# Patient Record
Sex: FEMALE | Race: WHITE | NOT HISPANIC OR LATINO | Employment: PART TIME | URBAN - METROPOLITAN AREA
[De-identification: names, ages, dates, MRNs, and addresses within clinical notes are randomized per-mention and may not be internally consistent; named-entity substitution may affect disease eponyms.]

---

## 2017-01-09 ENCOUNTER — HOSPITAL ENCOUNTER (OUTPATIENT)
Dept: ULTRASOUND IMAGING | Facility: CLINIC | Age: 42
Discharge: HOME | End: 2017-01-09
Payer: COMMERCIAL

## 2017-01-09 DIAGNOSIS — R92.2 BREAST DENSITY: ICD-10-CM

## 2017-01-09 PROCEDURE — 76641 ULTRASOUND BREAST COMPLETE: CPT

## 2017-01-09 PROCEDURE — 76377 3D RENDER W/INTRP POSTPROCES: CPT

## 2017-04-26 ENCOUNTER — ALLSCRIPTS OFFICE VISIT (OUTPATIENT)
Dept: OTHER | Facility: OTHER | Age: 42
End: 2017-04-26

## 2017-04-26 ENCOUNTER — TRANSCRIBE ORDERS (OUTPATIENT)
Dept: LAB | Facility: CLINIC | Age: 42
End: 2017-04-26

## 2017-04-26 ENCOUNTER — APPOINTMENT (OUTPATIENT)
Dept: LAB | Facility: CLINIC | Age: 42
End: 2017-04-26
Payer: COMMERCIAL

## 2017-04-26 DIAGNOSIS — F41.9 ANXIETY DISORDER: ICD-10-CM

## 2017-04-26 DIAGNOSIS — E66.01 MORBID (SEVERE) OBESITY DUE TO EXCESS CALORIES (HCC): ICD-10-CM

## 2017-04-26 LAB
ALBUMIN SERPL BCP-MCNC: 4 G/DL (ref 3.5–5)
ALP SERPL-CCNC: 100 U/L (ref 46–116)
ALT SERPL W P-5'-P-CCNC: 19 U/L (ref 12–78)
ANION GAP SERPL CALCULATED.3IONS-SCNC: 6 MMOL/L (ref 4–13)
AST SERPL W P-5'-P-CCNC: 10 U/L (ref 5–45)
BILIRUB SERPL-MCNC: 0.55 MG/DL (ref 0.2–1)
BUN SERPL-MCNC: 11 MG/DL (ref 5–25)
CALCIUM SERPL-MCNC: 8.7 MG/DL (ref 8.3–10.1)
CHLORIDE SERPL-SCNC: 105 MMOL/L (ref 100–108)
CHOLEST SERPL-MCNC: 172 MG/DL (ref 50–200)
CO2 SERPL-SCNC: 29 MMOL/L (ref 21–32)
CREAT SERPL-MCNC: 0.61 MG/DL (ref 0.6–1.3)
GFR SERPL CREATININE-BSD FRML MDRD: >60 ML/MIN/1.73SQ M
GLUCOSE P FAST SERPL-MCNC: 98 MG/DL (ref 65–99)
HDLC SERPL-MCNC: 43 MG/DL (ref 40–60)
LDLC SERPL CALC-MCNC: 97 MG/DL (ref 0–100)
POTASSIUM SERPL-SCNC: 4.3 MMOL/L (ref 3.5–5.3)
PROT SERPL-MCNC: 7.8 G/DL (ref 6.4–8.2)
SODIUM SERPL-SCNC: 140 MMOL/L (ref 136–145)
TRIGL SERPL-MCNC: 161 MG/DL
TSH SERPL DL<=0.05 MIU/L-ACNC: 1.04 UIU/ML (ref 0.36–3.74)

## 2017-04-26 PROCEDURE — 84443 ASSAY THYROID STIM HORMONE: CPT

## 2017-04-26 PROCEDURE — 80061 LIPID PANEL: CPT

## 2017-04-26 PROCEDURE — 36415 COLL VENOUS BLD VENIPUNCTURE: CPT

## 2017-04-26 PROCEDURE — 80053 COMPREHEN METABOLIC PANEL: CPT

## 2017-07-10 ENCOUNTER — HOSPITAL ENCOUNTER (OUTPATIENT)
Dept: RADIOLOGY | Age: 42
Discharge: HOME | End: 2017-07-10
Payer: COMMERCIAL

## 2017-07-10 DIAGNOSIS — R92.2 INCONCLUSIVE MAMMOGRAM: ICD-10-CM

## 2017-07-10 PROCEDURE — G0202 SCR MAMMO BI INCL CAD: HCPCS

## 2017-07-10 PROCEDURE — 77063 BREAST TOMOSYNTHESIS BI: CPT

## 2017-07-12 ENCOUNTER — ALLSCRIPTS OFFICE VISIT (OUTPATIENT)
Dept: OTHER | Facility: OTHER | Age: 42
End: 2017-07-12

## 2017-07-12 ENCOUNTER — TRANSCRIBE ORDERS (OUTPATIENT)
Dept: ADMINISTRATIVE | Facility: HOSPITAL | Age: 42
End: 2017-07-12

## 2017-07-12 DIAGNOSIS — Z12.39 SCREENING BREAST EXAMINATION: ICD-10-CM

## 2017-07-12 DIAGNOSIS — R92.2 BREAST DENSITY: Primary | ICD-10-CM

## 2017-07-13 ENCOUNTER — ALLSCRIPTS OFFICE VISIT (OUTPATIENT)
Dept: OTHER | Facility: OTHER | Age: 42
End: 2017-07-13

## 2017-07-20 ENCOUNTER — GENERIC CONVERSION - ENCOUNTER (OUTPATIENT)
Dept: OTHER | Facility: OTHER | Age: 42
End: 2017-07-20

## 2017-07-25 ENCOUNTER — ALLSCRIPTS OFFICE VISIT (OUTPATIENT)
Dept: OTHER | Facility: OTHER | Age: 42
End: 2017-07-25

## 2017-07-27 ENCOUNTER — GENERIC CONVERSION - ENCOUNTER (OUTPATIENT)
Dept: OTHER | Facility: OTHER | Age: 42
End: 2017-07-27

## 2017-08-03 ENCOUNTER — GENERIC CONVERSION - ENCOUNTER (OUTPATIENT)
Dept: OTHER | Facility: OTHER | Age: 42
End: 2017-08-03

## 2017-08-10 ENCOUNTER — GENERIC CONVERSION - ENCOUNTER (OUTPATIENT)
Dept: OTHER | Facility: OTHER | Age: 42
End: 2017-08-10

## 2017-08-17 ENCOUNTER — GENERIC CONVERSION - ENCOUNTER (OUTPATIENT)
Dept: OTHER | Facility: OTHER | Age: 42
End: 2017-08-17

## 2017-08-24 ENCOUNTER — GENERIC CONVERSION - ENCOUNTER (OUTPATIENT)
Dept: OTHER | Facility: OTHER | Age: 42
End: 2017-08-24

## 2017-08-31 ENCOUNTER — GENERIC CONVERSION - ENCOUNTER (OUTPATIENT)
Dept: OTHER | Facility: OTHER | Age: 42
End: 2017-08-31

## 2017-09-07 ENCOUNTER — ALLSCRIPTS OFFICE VISIT (OUTPATIENT)
Dept: OTHER | Facility: OTHER | Age: 42
End: 2017-09-07

## 2017-09-14 ENCOUNTER — GENERIC CONVERSION - ENCOUNTER (OUTPATIENT)
Dept: OTHER | Facility: OTHER | Age: 42
End: 2017-09-14

## 2017-09-20 ENCOUNTER — ALLSCRIPTS OFFICE VISIT (OUTPATIENT)
Dept: OTHER | Facility: OTHER | Age: 42
End: 2017-09-20

## 2017-09-21 ENCOUNTER — GENERIC CONVERSION - ENCOUNTER (OUTPATIENT)
Dept: OTHER | Facility: OTHER | Age: 42
End: 2017-09-21

## 2017-09-28 ENCOUNTER — GENERIC CONVERSION - ENCOUNTER (OUTPATIENT)
Dept: OTHER | Facility: OTHER | Age: 42
End: 2017-09-28

## 2017-10-10 ENCOUNTER — ALLSCRIPTS OFFICE VISIT (OUTPATIENT)
Dept: OTHER | Facility: OTHER | Age: 42
End: 2017-10-10

## 2017-10-11 ENCOUNTER — GENERIC CONVERSION - ENCOUNTER (OUTPATIENT)
Dept: OTHER | Facility: OTHER | Age: 42
End: 2017-10-11

## 2017-10-12 ENCOUNTER — GENERIC CONVERSION - ENCOUNTER (OUTPATIENT)
Dept: OTHER | Facility: OTHER | Age: 42
End: 2017-10-12

## 2017-10-18 ENCOUNTER — GENERIC CONVERSION - ENCOUNTER (OUTPATIENT)
Dept: OTHER | Facility: OTHER | Age: 42
End: 2017-10-18

## 2017-11-08 ENCOUNTER — GENERIC CONVERSION - ENCOUNTER (OUTPATIENT)
Dept: OTHER | Facility: OTHER | Age: 42
End: 2017-11-08

## 2017-11-15 ENCOUNTER — ALLSCRIPTS OFFICE VISIT (OUTPATIENT)
Dept: OTHER | Facility: OTHER | Age: 42
End: 2017-11-15

## 2017-11-16 NOTE — PROGRESS NOTES
Assessment    1  Acute upper respiratory infection (465 9) (J06 9)    Plan  Acute upper respiratory infection    · Fluticasone Propionate 50 MCG/ACT Nasal Suspension; USE 2 SPRAYS IN [facility] ONCE DAILY    Discussion/Summary    Nighat Sanderson is here with symptoms of viral upper respiratory infection  We discussed symptomatic treatment with increased water intake  In addition she should continue with the Mucinex which has been helpful  Recommended Flonase nasal spray 2 sprays each nostril daily  She may also use over-the-counter saline spray  should contact us if her symptoms are not improving over the next few days  Chief Complaint  cold      History of Present Illness  HPI: cold sx began 2 weeks ago with cough, tickleevolved into productive cough and her sinuses are full and she has severe nasal congestionis taking children's Mucinex which helps a littleis stable on Prozac and clonazepam  She sees dr Les Aragon     Review of Systems   Constitutional: feeling poorly-- and-- feeling tired, but-- no fever-- and-- no chills  ENT: nasal discharge, but-- no earache-- and-- no sore throat  Cardiovascular: no complaints of slow or fast heart rate, no chest pain, no palpitations, no leg claudication or lower extremity edema  Respiratory: cough  Active Problems    1  Acute sinusitis (461 9) (J01 90)   2  Anxiety (300 00) (F41 9)   3  Dense breast tissue (793 82) (R92 2)   4  Encounter for breast cancer screening other than mammogram (V76 10) (Z12 39)   5  Encounter for routine gynecological examination (V72 31) (Z01 419)   6  Encounter for screening mammogram for malignant neoplasm of breast (V76 12) (Z12 31)   7  Flu vaccine need (V04 81) (Z23)   8  IBS (irritable bowel syndrome) (564 1) (K58 9)   9  OCD (obsessive compulsive disorder) (300 3) (F42 9)   10  Panic attacks (300 01) (F41 0)   11  Pap smear, as part of routine gynecological examination (V76 2) (Z01 419)   12  Seasonal allergies (477 9) (J30 2)   13   Severe obesity (BMI 35 0-39 9) (278 01) (E66 01)   14  Vitamin D deficiency (268 9) (E55 9)    Past Medical History  1  History of Benign cyst of breast (610 9) (N60 09)   2  History of pilonidal cyst (V13 3) (Z87 2)   3  History of pregnancy (V13 29)   4  History of spontaneous  (V13 29) (Z87 59)   5  History of Menarche (V21 8)   6  History of Sinus problem (473 9) (J34 9)  Active Problems And Past Medical History Reviewed: The active problems and past medical history were reviewed and updated today  Family History  Mother    1  Family history of Colon carcinoma   2  Family history of diabetes mellitus (V18 0) (Z83 3)   3  Family history of gallbladder disease (V18 59) (Z83 79)   4  Family history of hypertension (V17 49) (Z82 49)   5  Family history of irritable bowel syndrome (V18 59) (Z83 79)   6  Family history of Recurrent kidney stones  Father    7  Family history of diabetes mellitus (V18 0) (Z83 3)   8  Family history of hypertension (V17 49) (Z82 49)  Brother    5  Family history of Melanoma  Grandmother    8  Family history of irritable bowel syndrome (V18 59) (Z83 79)  Maternal Grandmother    6  Family history of malignant neoplasm of breast (V16 3) (Z80 3)  Paternal Grandmother    15  Family history of malignant neoplasm of stomach (V16 0) (Z80 0)   13  Family history of Ovarian cancer  Maternal Uncle    15  Family history of Malignant neoplasm of pancreas, unspecified location of malignancy    Social History   · Always uses seat belt   · Daily caffeine consumption   · Never a smoker   · No alcohol use   · No drug use    Surgical History    1  History of Biopsy Skin   2  History of Breast Surgery Excision Of Breast Single Lesion   3  History of Dilation And Curettage   4  History of Oral Surgery   5  History of Pilonidal Cyst Resection    Current Meds   1  Allegra CAPS; Therapy: (Recorded:86Bez3734) to Recorded   2  Calcium TABS; Therapy: (Recorded:2017) to Recorded   3   ClonazePAM 0 5 MG Oral Tablet; Take 1 tablet daily; Therapy: 59YRG6637 to (Evaluate:01Zrq2351); Last Rx:26Apr2017 Ordered   4  FLUoxetine HCl - 20 MG Oral Capsule; TAKE ONE CAPSULE BY MOUTH EVERY DAY; Last Rx:26Apr2017 Ordered   5  QUEtiapine Fumarate ER 50 MG Oral Tablet Extended Release 24 Hour; TAKE 1 TABLET DAILY; Last Rx:26Apr2017 Ordered   6  Vitamin D3 2000 UNIT Oral Capsule; Therapy: (Recorded:62Mgv6986) to Recorded    The medication list was reviewed and updated today  Allergies  1  Bactrim DS TABS   2  quetiapine   3  sulfa    Vitals   Recorded: 74NHS1002 10:40AM Recorded: 76BXU8425 10:22AM   Temperature  17 8 F   Systolic 127    Diastolic 70    Height  5 ft 1 5 in   Weight  192 lb 4 oz   BMI Calculated  35 74   BSA Calculated  1 87       Physical Exam   Constitutional  General appearance: No acute distress, well appearing and well nourished  overweight  Ears, Nose, Mouth, and Throat  External inspection of ears and nose: Normal    Otoscopic examination: Tympanic membranes translucent with normal light reflex  Canals patent without erythema  Nasal mucosa, septum, and turbinates: Abnormal   There was clear rhinorrhea from both nares  The bilateral nasal mucosa was boggy  Pulmonary  Respiratory effort: No increased work of breathing or signs of respiratory distress  Auscultation of lungs: Clear to auscultation  Cardiovascular  Auscultation of heart: Normal rate and rhythm, normal S1 and S2, without murmurs  Carotid pulses: Normal          Future Appointments    Date/Time Provider Specialty Site   07/23/2018 01:45 PM Rudy Skiff, M D  Surgical Oncology [facility] [place]   [facility] [place] [address] [facility]   01/18/2018 09:00 AM SYDNEY Gay   [facility]       Signatures   Electronically signed by : Nu Garcia MD; Nov 15 2017  6:38PM EST                       (Author)

## 2017-12-11 ENCOUNTER — GENERIC CONVERSION - ENCOUNTER (OUTPATIENT)
Dept: OTHER | Facility: OTHER | Age: 42
End: 2017-12-11

## 2018-01-10 ENCOUNTER — HOSPITAL ENCOUNTER (OUTPATIENT)
Dept: ULTRASOUND IMAGING | Facility: CLINIC | Age: 43
Discharge: HOME | End: 2018-01-10
Payer: COMMERCIAL

## 2018-01-10 ENCOUNTER — GENERIC CONVERSION - ENCOUNTER (OUTPATIENT)
Dept: SURGICAL ONCOLOGY | Facility: CLINIC | Age: 43
End: 2018-01-10

## 2018-01-10 DIAGNOSIS — R92.2 BREAST DENSITY: ICD-10-CM

## 2018-01-10 PROCEDURE — 76641 ULTRASOUND BREAST COMPLETE: CPT

## 2018-01-10 PROCEDURE — 76377 3D RENDER W/INTRP POSTPROCES: CPT

## 2018-01-11 ENCOUNTER — GENERIC CONVERSION - ENCOUNTER (OUTPATIENT)
Dept: OTHER | Facility: OTHER | Age: 43
End: 2018-01-11

## 2018-01-11 DIAGNOSIS — R92.8 OTHER ABNORMAL AND INCONCLUSIVE FINDINGS ON DIAGNOSTIC IMAGING OF BREAST: ICD-10-CM

## 2018-01-11 NOTE — PROGRESS NOTES
History of Present Illness  HPI: Valla Dandy is here for initial RD session for [employer]  Current wt: 233 6 lbs  Has experienced weight gain since initiation of seroquel with weight gain of 40-50 lbs  States she was active prior to children but feels guilty doing things for herself (kids are now 6 & 6)  Discussed the importance of taking time for herself in order to be as healthy as possible  She currently skips meals often, sometimes not eating until dinner  When she does have meals they are high fat foods  She was opening to transitioning to 1% milk vs  2%  Explained this is good for the whole family and discussed how to wean from one to the other  She will utilize 2 meal replacements at this time  Bariatric MNT [facility]:   Weight Assessment: IBW: 107 5 lbs, ABW: 139 lbs, Highest Weight: 242 lbs, Lowest Weight: 150 lbs, Goal Weight: 210-222 lbs  Weight loss attempts: Weight Watchers: 40 lbs  Excess calories come from large portions at mealtimes and high calorie high fat food choices  Dietary Recall:   Breakfast: wake: 5:45-6:30: skip OR 2 egg ham wake up wrap 1 or 2  Snack: skip  Lunch: skip or s/w w/2 cheese, goldfish, unsweetened tea or 2% milk  Snack: skip  Dinner: 6:30 p m  school year  8:00 summer:lean pro, carb (large), veggies  Snack: pretzels, cheese/crackers, hummus  bed   Beverages: water, unsweetened tea, 2% milk  Estimated fluid intake: ~48 oz water  Alcohol consumption: occasional    Food allergies/intolerances: n/a  Cooking: self  Shopping: self  She dines out 1-2 times per week  Exercise Frequency:  She does not exercise  Her obesity/being overweight is related to excess energy intake and as evidenced by BMI=43 4  Nutrition Prescription: Estimated calories for weight loss: Tanita BMR 1727x1  3-2692=7630, eCalc BMR 1660, wt loss w/o exercise 992-1492, Estimated daily protein needs: 59-73 gm/day (1 2-1 5 gm/kg IBW) and Estimated daily fluid needs: 57 oz (35 cc/kg IBW)        Breakfast: replacement  Snack: skip  Lunch: replacement  Snack: food snack  Dinner: 300-365: 3 oz lean meat,  doris carb, veggies  Snack: food snack   Nutrition Intervention: Counseling provided with emphasis on meal planning, portion sizes, healthy snack choices, hydration, fiber intake, protein intake, exercise and food journaling  Education materials provided: [employer] manual    Comprehension: good  Expected Compliance: good  Goals: follow meal plan prescribed, reduce portion sizes at mealtimes, plan meals and snacks daily, Choose lower-calorie, lower-fat meal options at home and when dining out, food journal, do not skip meals or snacks and 6710-3117 calories using 2 replacements and 0-1 bar  Monitor/Evaluation: weekly weight, food journal, fluid intake and exercise level  Active Problems    1  Acute sinusitis (461 9) (J01 90)   2  Anxiety (300 00) (F41 9)   3  Dense breast tissue (793 82) (R92 2)   4  Encounter for breast cancer screening other than mammogram (V76 10) (Z12 39)   5  Encounter for routine gynecological examination (V72 31) (Z01 419)   6  Encounter for screening mammogram for malignant neoplasm of breast (V76 12)   (Z12 31)   7  IBS (irritable bowel syndrome) (564 1) (K58 9)   8  Morbid obesity (278 01) (E66 01)   9  OCD (obsessive compulsive disorder) (300 3) (F42 9)   10  Panic attacks (300 01) (F41 0)   11  Pap smear, as part of routine gynecological examination (V76 2) (Z01 419)   12  Seasonal allergies (477 9) (J30 2)   13  Vitamin D deficiency (268 9) (E55 9)    Past Medical History    1  History of Benign cyst of breast (610 9) (N60 09)   2  History of pilonidal cyst (V13 3) (Z87 2)   3  History of pregnancy (V13 29)   4  History of spontaneous  (V13 29) (Z87 59)   5  History of Menarche (V21 8)   6  History of Sinus problem (473 9) (J34 9)    Surgical History    1  History of Biopsy Skin   2   History of Breast Surgery Excision Of Breast Single Lesion   3  History of Dilation And Curettage   4  History of Oral Surgery   5  History of Pilonidal Cyst Resection    Family History  Mother    1  Family history of Colon carcinoma   2  Family history of diabetes mellitus (V18 0) (Z83 3)   3  Family history of gallbladder disease (V18 59) (Z83 79)   4  Family history of hypertension (V17 49) (Z82 49)   5  Family history of irritable bowel syndrome (V18 59) (Z83 79)   6  Family history of Recurrent kidney stones  Father    7  Family history of diabetes mellitus (V18 0) (Z83 3)   8  Family history of hypertension (V17 49) (Z82 49)  Brother    5  Family history of Melanoma  Grandmother    8  Family history of irritable bowel syndrome (V18 59) (Z83 79)  Maternal Grandmother    6  Family history of malignant neoplasm of breast (V16 3) (Z80 3)  Paternal Grandmother    15  Family history of malignant neoplasm of stomach (V16 0) (Z80 0)   13  Family history of Ovarian cancer  Maternal Uncle    15  Family history of Malignant neoplasm of pancreas, unspecified location of malignancy    Social History    · Always uses seat belt   · Daily caffeine consumption   · Never a smoker   · No alcohol use   · No drug use    Current Meds   1  Allegra CAPS; Therapy: (Recorded:49Shl0195) to Recorded   2  ClonazePAM 0 5 MG Oral Tablet; Take 1 tablet daily; Therapy: 20WPK9023 to (Evaluate:34Yhd5469); Last Rx:26Apr2017 Ordered   3  FLUoxetine HCl - 20 MG Oral Capsule; TAKE ONE CAPSULE BY MOUTH EVERY DAY;   Last Rx:26Apr2017 Ordered   4  QUEtiapine Fumarate ER 50 MG Oral Tablet Extended Release 24 Hour; TAKE 1 TABLET   DAILY; Last Rx:26Apr2017 Ordered   5  Vitamin D3 2000 UNIT Oral Capsule; Therapy: (Recorded:10Vtt7520) to Recorded    Allergies    1   Bactrim DS TABS   2  quetiapine   3  sulfa    Vitals  Signs   Recorded: 13JZS2247 07:12AM   Height: 5 ft 1 5 in  Weight: 233 lb 9 6 oz  BMI Calculated: 43 42  BSA Calculated: 2 03    Results/Data  [employer] Flowsheet [id number] 07:06AM Andreas Solar     Test Name Result Flag Reference   Height 61 5     Weight 233 6     Body Fat % 48 2     Fat Mass 112 6     FFM ( Fat Free Mass) 121 0     Muscle Mass 114 8     Bone Mass 6 2     BMR ( Basal Metabolic Rate) 0046     Metabolic Age 61     Visceral Fat Rating 14     BMI 43 4         Future Appointments    Date/Time Provider Specialty Site   07/23/2018 01:45 PM SYDNEY Taylor  Surgical Oncology [facility] [place]   07/25/2017 02:30 PM Serafin Wick [address] [facility]   10/18/2017 10:45 AM SYDNEY Perez  Bariatric Medicine [facility]   09/20/2017 10:00 AM Shelbi Loera     Signatures   Electronically signed by :  Adalberto Sequeira, ; Jul 14 2017  7:12AM EST                       (Author)    Electronically signed by : SYDNEY Lemus ; Jul 14 2017  8:12AM EST                       (Co-author)

## 2018-01-11 NOTE — PROGRESS NOTES
History of Present Illness  HPI: Boom Lion is here for f/u  Current wt 213 8 lbs  Loss of 21 7 lbs x 7 wks  Tracking ~1100 calories/day and feels good there  Based on recall may actually be slightly less than 1100 calories and lunch can use some improvement in terms of protein  She has started exercising at the gym and is enjoying this  She will continue to use 2 replacements and 1 bar at this time  Bariatric MNT [facility]:   Weight Assessment: IBW: 107 5 lbs, ABW: 134 lbs, Weight Change: 21 7 lb loss x 7 wks, Highest Weight: 242 lbs, Lowest Weight: 150 lbs, Goal Weight: 210 lbs  Weight loss attempts: Weight Watchers: 40 lbs  Dietary Recall:   Breakfast: replacement  Snack: skip  Lunch: 1/2 egg, 1 slice ham, wrap (90)  Snack: replacement  Dinner: 3 oz lean pro, carb, veggies  Snack: bars   Beverages: water  Estimated fluid intake: >64 oz  Alcohol consumption: occasional    Food allergies/intolerances: n/a  Cooking: self  Shopping: self  She dines out 1-2 times per week  Exercise Frequency:  She exercises 2-3x/wk strength training, walking  Her obesity/being overweight is related to excess energy intake and as evidenced by BMI=39 7  Nutrition Prescription: Estimated calories for weight loss: eCalc BMR 1570, wt loss w/o exercise 884-1384, w/exercise 0492-3374, Estimated daily protein needs: 59-73 gm (1 2-1 5 gm/kg IBW) and Estimated daily fluid needs: 57 oz (35 cc/kg IBW)  Lunch: increase to full egg and 2 oz ham    Nutrition Intervention: Counseling provided with emphasis on portion sizes, hydration, protein intake, exercise and food journaling  Education materials provided: [employer] manual    Comprehension: good  Expected Compliance: good  Goals: follow meal plan prescribed, plan meals and snacks daily, food journal, do not skip meals or snacks and 4444-1878 calories using 2 replacements and 1 bar     Monitor/Evaluation: weekly weight, food journal, fluid intake and exercise level  Active Problems    1  Acute sinusitis (461 9) (J01 90)   2  Anxiety (300 00) (F41 9)   3  Dense breast tissue (793 82) (R92 2)   4  Encounter for breast cancer screening other than mammogram (V76 10) (Z12 39)   5  Encounter for routine gynecological examination (V72 31) (Z01 419)   6  Encounter for screening mammogram for malignant neoplasm of breast (V76 12)   (Z12 31)   7  IBS (irritable bowel syndrome) (564 1) (K58 9)   8  Morbid obesity (278 01) (E66 01)   9  OCD (obsessive compulsive disorder) (300 3) (F42 9)   10  Panic attacks (300 01) (F41 0)   11  Pap smear, as part of routine gynecological examination (V76 2) (Z01 419)   12  Seasonal allergies (477 9) (J30 2)   13  Vitamin D deficiency (268 9) (E55 9)    Past Medical History    1  History of Benign cyst of breast (610 9) (N60 09)   2  History of pilonidal cyst (V13 3) (Z87 2)   3  History of pregnancy (V13 29)   4  History of spontaneous  (V13 29) (Z87 59)   5  History of Menarche (V21 8)   6  History of Sinus problem (473 9) (J34 9)    Surgical History    1  History of Biopsy Skin   2  History of Breast Surgery Excision Of Breast Single Lesion   3  History of Dilation And Curettage   4  History of Oral Surgery   5  History of Pilonidal Cyst Resection    Family History  Mother    1  Family history of Colon carcinoma   2  Family history of diabetes mellitus (V18 0) (Z83 3)   3  Family history of gallbladder disease (V18 59) (Z83 79)   4  Family history of hypertension (V17 49) (Z82 49)   5  Family history of irritable bowel syndrome (V18 59) (Z83 79)   6  Family history of Recurrent kidney stones  Father    7  Family history of diabetes mellitus (V18 0) (Z83 3)   8  Family history of hypertension (V17 49) (Z82 49)  Brother    5  Family history of Melanoma  Grandmother    8  Family history of irritable bowel syndrome (V18 59) (Z83 79)  Maternal Grandmother    6   Family history of malignant neoplasm of breast (V16 3) (Z80 3)  Paternal Grandmother    15  Family history of malignant neoplasm of stomach (V16 0) (Z80 0)   13  Family history of Ovarian cancer  Maternal Uncle    15  Family history of Malignant neoplasm of pancreas, unspecified location of malignancy    Social History    · Always uses seat belt   · Daily caffeine consumption   · Never a smoker   · No alcohol use   · No drug use    Current Meds   1  Allegra CAPS; Therapy: (Recorded:08Tyf5004) to Recorded   2  ClonazePAM 0 5 MG Oral Tablet; Take 1 tablet daily; Therapy: 19QKN4447 to (Evaluate:41Enz1753); Last Rx:26Apr2017 Ordered   3  FLUoxetine HCl - 20 MG Oral Capsule; TAKE ONE CAPSULE BY MOUTH EVERY DAY;   Last Rx:26Apr2017 Ordered   4  QUEtiapine Fumarate ER 50 MG Oral Tablet Extended Release 24 Hour; TAKE 1 TABLET   DAILY; Last Rx:26Apr2017 Ordered   5  Vitamin D3 2000 UNIT Oral Capsule; Therapy: (Recorded:12Uxl4099) to Recorded    Allergies    1  Bactrim DS TABS   2  quetiapine   3  sulfa    Vitals  Signs   Recorded: 87Jtj8369 11:32AM   Height: 5 ft 1 5 in  Weight: 213 lb 12 8 oz  BMI Calculated: 39 74  BSA Calculated: 1 95    Future Appointments    Date/Time Provider Specialty Site   07/23/2018 01:45 PM SYDNEY Keller  Surgical Oncology [facility] [place]   10/10/2017 08:30 AM Serafin Wick [address] [place] [address] [facility]   10/18/2017 10:45 AM SYDNEY Costa  Bariatric Medicine [facility]   09/20/2017 10:00 AM Shelbi William     Signatures   Electronically signed by :  Suzi Goss, ; Sep  7 2017 11:31AM EST                       (Author)    Electronically signed by : SYDNEY Turcios ; Sep  7 2017 12:10PM EST                       (Co-author)

## 2018-01-12 ENCOUNTER — HOSPITAL ENCOUNTER (OUTPATIENT)
Dept: ULTRASOUND IMAGING | Facility: CLINIC | Age: 43
Discharge: HOME | End: 2018-01-12
Payer: COMMERCIAL

## 2018-01-12 VITALS — WEIGHT: 208.4 LBS | BODY MASS INDEX: 38.35 KG/M2 | HEIGHT: 62 IN

## 2018-01-12 VITALS — HEIGHT: 62 IN | WEIGHT: 201 LBS | BODY MASS INDEX: 36.99 KG/M2

## 2018-01-12 DIAGNOSIS — R92.8 OTHER ABNORMAL AND INCONCLUSIVE FINDINGS ON DIAGNOSTIC IMAGING OF BREAST: ICD-10-CM

## 2018-01-12 PROCEDURE — 76642 ULTRASOUND BREAST LIMITED: CPT

## 2018-01-12 NOTE — PROGRESS NOTES
History of Present Illness  HPI: Emily Urban is here for 3 month f/u with Tanita and REE  Current wt: 201 lbs  Loss of 32 6 lbs x 3 months (13 9%)  Tanita shows fat loss of 15 6 lbs (49%) however results are likely skewed as hydration status not present on Tanita  Muscle mass remains > normal range and protein intake has been adequate  Bone loss of 1 0 lb  Recommend incorporate Ca+D, she has been strength training  REE 25% > predicted when compared to females of her age/ht/wt  Measured 2016 kcal vs  1616 kcal predicted  Getting to the gym most days with strength 1x/wk at this point  She will continue with meal replacements at this time and f/u with RD visits  Bariatric MNT [facility]:   Weight Assessment: IBW: 107 5 lbs, ABW: 130 8 lbs, Weight Change: 32 6 lb loss x 3 m, Highest Weight: 242 lbs, Lowest Weight: 150 lbs  Weight loss attempts: Weight Watchers: 40 lbs  Excess calories come from large portions at mealtimes and high calorie high fat food choices  Dietary Recall:   Breakfast: replacement  Snack: skip  Lunch: 1 egg, 3 slices ham, wrap  Snack: replacement  Dinner: 3 oz lean pro, 1/2 cup carb, veggies  Snack: bar   Beverages: water  Estimated fluid intake: 80 oz  Alcohol consumption: occasional    Food allergies/intolerances: n/a  Cooking: self  Shopping: self  She dines out 1-2 times per week  Exercise Frequency:  She exercises gym 1 hr most days cardio, 1x/wk strength  Her obesity/being overweight is related to excess energy intake and as evidenced by BMI=37 4  Nutrition Prescription: Estimated calories for weight loss: Tanita BMR 1494x1  3-0535=137, REE 2016x1  3-4157=3630, eCalc BMR 1512, wt loss w/o exercise 812-1312, w/exercise 7591-3293, Estimated daily protein needs: 59-73 gm (1 2-1 5 gm/kg IBW) and Estimated daily fluid needs: 57 oz (35 cc/kg IBW)         Nutrition Intervention: Counseling provided with emphasis on portion sizes, hydration, protein intake, exercise, food journaling and Ca+D  Education materials provided: [employer] manual    Comprehension: good  Expected Compliance: good  Goals: follow meal plan prescribed, plan meals and snacks daily, food journal, do not skip meals or snacks and incorporate Ca+D, 0499-7588 calories using 2 replacements and 1 bar  Monitor/Evaluation: weekly weight, food journal, fluid intake and exercise level  Active Problems    1  Acute sinusitis (461 9) (J01 90)   2  Anxiety (300 00) (F41 9)   3  Dense breast tissue (793 82) (R92 2)   4  Encounter for breast cancer screening other than mammogram (V76 10) (Z12 39)   5  Encounter for routine gynecological examination (V72 31) (Z01 419)   6  Encounter for screening mammogram for malignant neoplasm of breast (V76 12)   (Z12 31)   7  IBS (irritable bowel syndrome) (564 1) (K58 9)   8  Morbid obesity (278 01) (E66 01)   9  OCD (obsessive compulsive disorder) (300 3) (F42 9)   10  Panic attacks (300 01) (F41 0)   11  Pap smear, as part of routine gynecological examination (V76 2) (Z01 419)   12  Seasonal allergies (477 9) (J30 2)   13  Vitamin D deficiency (268 9) (E55 9)    Past Medical History    1  History of Benign cyst of breast (610 9) (N60 09)   2  History of pilonidal cyst (V13 3) (Z87 2)   3  History of pregnancy (V13 29)   4  History of spontaneous  (V13 29) (Z87 59)   5  History of Menarche (V21 8)   6  History of Sinus problem (473 9) (J34 9)    Surgical History    1  History of Biopsy Skin   2  History of Breast Surgery Excision Of Breast Single Lesion   3  History of Dilation And Curettage   4  History of Oral Surgery   5  History of Pilonidal Cyst Resection    Family History  Mother    1  Family history of Colon carcinoma   2  Family history of diabetes mellitus (V18 0) (Z83 3)   3  Family history of gallbladder disease (V18 59) (Z83 79)   4  Family history of hypertension (V17 49) (Z82 49)   5  Family history of irritable bowel syndrome (V18 59) (Z83 79)   6  Family history of Recurrent kidney stones  Father    7  Family history of diabetes mellitus (V18 0) (Z83 3)   8  Family history of hypertension (V17 49) (Z82 49)  Brother    5  Family history of Melanoma  Grandmother    8  Family history of irritable bowel syndrome (V18 59) (Z83 79)  Maternal Grandmother    6  Family history of malignant neoplasm of breast (V16 3) (Z80 3)  Paternal Grandmother    15  Family history of malignant neoplasm of stomach (V16 0) (Z80 0)   13  Family history of Ovarian cancer  Maternal Uncle    15  Family history of Malignant neoplasm of pancreas, unspecified location of malignancy    Social History    · Always uses seat belt   · Daily caffeine consumption   · Never a smoker   · No alcohol use   · No drug use    Current Meds   1  Allegra CAPS; Therapy: (Recorded:33Via9559) to Recorded   2  ClonazePAM 0 5 MG Oral Tablet; Take 1 tablet daily; Therapy: 40JJB6278 to (Evaluate:43Ewb0803); Last Rx:26Apr2017 Ordered   3  FLUoxetine HCl - 20 MG Oral Capsule; TAKE ONE CAPSULE BY MOUTH EVERY DAY;   Last Rx:56Dpi0110 Ordered   4  QUEtiapine Fumarate ER 50 MG Oral Tablet Extended Release 24 Hour; TAKE 1 TABLET   DAILY; Last Rx:26Apr2017 Ordered   5  Vitamin D3 2000 UNIT Oral Capsule; Therapy: (Recorded:07Rwa6593) to Recorded    Allergies    1   Bactrim DS TABS   2  quetiapine   3  sulfa    Vitals  Signs   Recorded: 72GJK9634 08:41AM   Height: 5 ft 1 5 in  Weight: 201 lb   BMI Calculated: 37 36  BSA Calculated: 1 9  Waist Circumference: 40    Results/Data  Metabolic Analyzer - POC 05UHW0699 11:15AM Glorya Nickel     Test Name Result Flag Reference   Metabolic Analyzer 7189       [facility] Flowsheet 43BOB9953 08:42AM Glorya Nickel     Test Name Result Flag Reference   Height 61 5     Weight 201 0     Body Fat % 48 3     Fat Mass 97 0     FFM ( Fat Free Mass) 104 0     Muscle Mass 98 8     Bone Mass 5 2     BMR ( Basal Metabolic Rate) 8641     Metabolic Age 61     Visceral Fat Rating 12     BMI 37 4 Future Appointments    Date/Time Provider Specialty Site   07/23/2018 01:45 PM SYDNEY Elias  Surgical Oncology [facility] [place]   11/08/2017 08:30 AM Serafin Wick [address] [place] [address] [facility]   10/18/2017 10:45 AM SYDNEY Dumas  Bariatric Medicine [place] [address] [facility]     Signatures   Electronically signed by :  Cliff Mobley, ; Oct 10 2017 11:14AM EST                       (Author)    Electronically signed by : SYDNEY Blanchard ; Oct 10 2017  2:15PM EST                       (Co-author)

## 2018-01-13 VITALS — WEIGHT: 205 LBS | HEIGHT: 62 IN | BODY MASS INDEX: 37.73 KG/M2

## 2018-01-13 VITALS — WEIGHT: 222.8 LBS | HEIGHT: 62 IN | BODY MASS INDEX: 41 KG/M2

## 2018-01-13 VITALS
BODY MASS INDEX: 43.68 KG/M2 | TEMPERATURE: 98 F | HEIGHT: 61 IN | RESPIRATION RATE: 17 BRPM | DIASTOLIC BLOOD PRESSURE: 90 MMHG | OXYGEN SATURATION: 97 % | HEART RATE: 94 BPM | SYSTOLIC BLOOD PRESSURE: 142 MMHG | WEIGHT: 231.38 LBS

## 2018-01-13 VITALS — WEIGHT: 226.6 LBS | BODY MASS INDEX: 41.7 KG/M2 | HEIGHT: 62 IN

## 2018-01-13 VITALS
SYSTOLIC BLOOD PRESSURE: 118 MMHG | BODY MASS INDEX: 35.38 KG/M2 | WEIGHT: 192.25 LBS | HEIGHT: 62 IN | TEMPERATURE: 98.8 F | DIASTOLIC BLOOD PRESSURE: 70 MMHG

## 2018-01-13 VITALS — BODY MASS INDEX: 41.59 KG/M2 | HEIGHT: 62 IN | WEIGHT: 226 LBS

## 2018-01-13 VITALS — HEIGHT: 62 IN | WEIGHT: 217 LBS | BODY MASS INDEX: 39.93 KG/M2

## 2018-01-13 VITALS — BODY MASS INDEX: 40.23 KG/M2 | WEIGHT: 218.6 LBS | HEIGHT: 62 IN

## 2018-01-13 VITALS — WEIGHT: 200.2 LBS | HEIGHT: 62 IN | BODY MASS INDEX: 36.84 KG/M2

## 2018-01-13 VITALS — WEIGHT: 233.6 LBS | HEIGHT: 62 IN | BODY MASS INDEX: 42.99 KG/M2

## 2018-01-13 VITALS — BODY MASS INDEX: 38.35 KG/M2 | HEIGHT: 62 IN | WEIGHT: 208.4 LBS

## 2018-01-13 VITALS — BODY MASS INDEX: 39.34 KG/M2 | WEIGHT: 213.8 LBS | HEIGHT: 62 IN

## 2018-01-14 VITALS — BODY MASS INDEX: 40.04 KG/M2 | WEIGHT: 217.6 LBS | HEIGHT: 62 IN

## 2018-01-14 VITALS — WEIGHT: 229.8 LBS | BODY MASS INDEX: 42.29 KG/M2 | HEIGHT: 62 IN

## 2018-01-14 NOTE — RESULT NOTES
Verified Results  (1) LIPID PANEL, FASTING 26Apr2017 10:03AM Janelle Alvarez    Order Number: [id number]     Test Name Result Flag Reference   CHOLESTEROL 172 mg/dL     HDL,DIRECT 43 mg/dL  40-60   Specimen collection should occur prior to Metamizole administration due to the potential for falsely depressed results  LDL CHOLESTEROL CALCULATED 97 mg/dL  0-100   Triglyceride:         Normal              <150 mg/dl       Borderline High    150-199 mg/dl       High               200-499 mg/dl       Very High          >499 mg/dl  Cholesterol:         Desirable        <200 mg/dl      Borderline High  200-239 mg/dl      High             >239 mg/dl  HDL Cholesterol:        High    >59 mg/dL      Low     <41 mg/dL  LDL CALCULATED:    This screening LDL is a calculated result  It does not have the accuracy of the Direct Measured LDL in the monitoring of patients with hyperlipidemia and/or statin therapy  Direct Measure LDL (ZED068) must be ordered separately in these patients  TRIGLYCERIDES 161 mg/dL H <=150   Specimen collection should occur prior to N-Acetylcysteine or Metamizole administration due to the potential for falsely depressed results  Plan  Anxiety    · ClonazePAM 0 5 MG Oral Tablet;  Take 1 tablet daily   · QUEtiapine Fumarate ER 50 MG Oral Tablet Extended Release 24 Hour; TAKE 1  TABLET DAILY  Anxiety, Morbid obesity    · (1) TSH WITH FT4 REFLEX; Status:Resulted - Requires Verification;   Done: 89VVL0042  10:03AM  Anxiety, OCD (obsessive compulsive disorder)    · FLUoxetine HCl - 20 MG Oral Capsule (PROzac); TAKE ONE CAPSULE BY  MOUTH EVERY DAY  Morbid obesity    · *1 - [facility]  *  Status: Active  Requested  for: 26Apr2017  Care Summary provided  : Yes   · (1) COMPREHENSIVE METABOLIC PANEL; Status:Resulted - Requires Verification;    Done: 10UDF7127 10:03AM   · (1) LIPID PANEL, FASTING; Status:Complete;   Done: 65YAM8500 10:03AM

## 2018-01-17 NOTE — PROGRESS NOTES
Assessment    1  Encounter for preventive health examination (V70 0) (Z00 00)   2  Anxiety (300 00) (F41 9)   3  Seasonal allergies (477 9) (J30 2)   4  Morbid obesity (278 01) (E66 01)    Plan  Anxiety, Morbid obesity    · (1) TSH WITH FT4 REFLEX; Status:Active; Requested for:26Apr2017; Morbid obesity    · (1) COMPREHENSIVE METABOLIC PANEL; Status:Active; Requested for:26Apr2017;    · (1) LIPID PANEL, FASTING; Status:Active; Requested for:26Apr2017;    · *1 - [facility]  *  Status: Active  Requested  for: 26Apr2017  Care Summary provided  : Yes    Discussion/Summary  health maintenance visit Currently, she eats a healthy diet and has an inadequate exercise regimen  cervical cancer screening is managed by Dr Mal Agustin Breast cancer screening: mammogram is current  Colorectal cancer screening: colorectal cancer screening is current  Diamante Coffey is here to establish as new patient and for health maintenance physical exam   She has been quite healthy, but but does struggle with her weight  I have given her referral for [facility]'s medical weight management program  I also urged her to increase the exercise  She would like to walk outdoors and then use the treadmill she has home if the weather is bad  She suffers from anxiety and panic disorder which is well controlled with her medications  She sees her psychiatrist every few months  She has seasonal allergies which are under control with Allegra  She is up-to-date with GYN and mammogram because of history of dense breasts  She will also be seeing Dr Aiyana Bhardwaj on a regular basis  Because of strong family history of colon cancer, she had colonoscopy January 2011  She is due for repeat and she will set this up with her GI group  We will plan to see her for regular yearly health maintenance visits and as needed in between  Chief Complaint  new pt establish care      History of Present Illness  HM, Adult Female:  The patient is being seen for a health maintenance evaluation  The last health maintenance visit was 2 year(s) ago  General Health: The patient's health since the last visit is described as good  She has regular dental visits  She complains of vision problems  Lifestyle:  She consumes a diverse and healthy diet  She has weight concerns  She does not exercise regularly  She does not use tobacco  She denies alcohol use  She denies drug use  Reproductive health: the patient is premenopausal    Screening:   HPI: first visit for Kirstie Dozier  Her prior doctor had passed away  BP has fluctuated and was elevated late in both pregnancies  She had renal workup and everything was normal   Strong FH HTN mother and father  Lots of moles, sees derm yearly  Anxiety sees Dr Katrina Morales for psychiatry  Diagnosed with anxiety, panic disorder, and OCD  She sees him every 4 months and feels very stable  , lives with  and 2 daughters age 10 and 6  Spring and fall allergies takes Allegra  Positive FH colon cancer maternal uncle and aunt  She had colonoscopy about 5 years ago with Dr Augie Franklin  Review of Systems    Constitutional: No fever, no chills, feels well, no tiredness, no recent weight gain or weight loss  ENT: no complaints of earache, no loss of hearing, no nose bleeds, no nasal discharge, no sore throat, no hoarseness  Cardiovascular: No complaints of slow heart rate, no fast heart rate, no chest pain, no palpitations, no leg claudication, no lower extremity edema  Respiratory: No complaints of shortness of breath, no wheezing, no cough, no SOB on exertion, no orthopnea, no PND  Gastrointestinal: No complaints of abdominal pain, no constipation, no nausea or vomiting, no diarrhea, no bloody stools  Genitourinary: No complaints of dysuria, no incontinence, no pelvic pain, no dysmenorrhea, no vaginal discharge or bleeding     Musculoskeletal: No complaints of arthralgias, no myalgias, no joint swelling or stiffness, no limb pain or swelling  Integumentary: as noted in HPI  Neurological: No complaints of headache, no confusion, no convulsions, no numbness, no dizziness or fainting, no tingling, no limb weakness, no difficulty walking  Psychiatric: as noted in HPI  Endocrine: No complaints of proptosis, no hot flashes, no muscle weakness, no deepening of the voice, no feelings of weakness  Hematologic/Lymphatic: No complaints of swollen glands, no swollen glands in the neck, does not bleed easily, does not bruise easily  Active Problems    1  Acute sinusitis (461 9) (J01 90)   2  Anxiety (300 00) (F41 9)   3  Dense breast tissue (793 82) (R92 2)   4  Encounter for routine gynecological examination (V72 31) (Z01 419)   5  Encounter for screening mammogram for malignant neoplasm of breast (V76 12)   (Z12 31)   6  IBS (irritable bowel syndrome) (564 1) (K58 9)   7  OCD (obsessive compulsive disorder) (300 3) (F42 9)   8  Panic attacks (300 01) (F41 0)   9  Pap smear, as part of routine gynecological examination (V76 2) (Z01 419)   10  Seasonal allergies (477 9) (J30 2)   11   Vitamin D deficiency (268 9) (E55 9)    Past Medical History    · History of Benign cyst of breast (610 9) (N60 99)   · History of pilonidal cyst (V13 3) (Z87 2)   · History of pregnancy (V13 29)   · History of spontaneous  (V13 29) (Z87 59)   · History of Menarche (V21 8)   · History of Sinus problem (473 9) (J34 9)    Surgical History    · History of Biopsy Skin   · History of Breast Surgery Excision Of Breast Single Lesion   · History of Dilation And Curettage   · History of Oral Surgery   · History of Pilonidal Cyst Resection    Family History  Mother    · Family history of Colon carcinoma   · Family history of diabetes mellitus (V18 0) (Z83 3)   · Family history of gallbladder disease (V18 59) (Z83 79)   · Family history of hypertension (V17 49) (Z82 49)   · Family history of irritable bowel syndrome (V18 59) (Z83 79)   · Family history of Recurrent kidney stones  Father    · Family history of diabetes mellitus (V18 0) (Z83 3)   · Family history of hypertension (V17 49) (Z82 49)  Brother    · Family history of Melanoma  Grandmother    · Family history of irritable bowel syndrome (V18 59) (Z83 79)  Maternal Grandmother    · Family history of malignant neoplasm of breast (V16 3) (Z80 3)  Paternal Grandmother    · Family history of malignant neoplasm of stomach (V16 0) (Z80 0)   · Family history of Ovarian cancer  Maternal Uncle    · Family history of Malignant neoplasm of pancreas, unspecified location of malignancy    Social History    · Never a smoker   · No alcohol use   · No drug use    Current Meds   1  Allegra CAPS; Therapy: (Recorded:17Xgw9843) to Recorded   2  Fluticasone Propionate 50 MCG/ACT Nasal Suspension; USE 2 SPRAYS IN EACH   NOSTRIL ONCE DAILY; Therapy: 68FQW0651 to (Last Rx:02Mar2016)  Requested for: 85RGO7687 Ordered   3  KlonoPIN 1 MG Oral Tablet; Therapy: (Recorded:61Czn3401) to Recorded   4  PROzac 40 MG Oral Capsule; Therapy: (Recorded:92Fad9457) to Recorded   5  SEROquel  MG Oral Tablet Extended Release 24 Hour; Therapy: (Recorded:38Jnu9354) to Recorded   6  Vitamin D3 2000 UNIT Oral Capsule; Therapy: (Recorded:11Wyq7645) to Recorded    Allergies    1  Ibuprofen TABS   2  quetiapine   3  sulfa    Vitals   Recorded: 07IJH9350 09:37AM Recorded: 14OJY1233 99:77CH   Systolic 839 143   Diastolic 84 80   Height  5 ft 1 3 in   Weight  227 lb 6 oz   BMI Calculated  42 54   BSA Calculated  2     Physical Exam    Constitutional   General appearance: No acute distress, well appearing and well nourished  morbidly obese  Head and Face   Head and face: Normal     Palpation of the face and sinuses: No sinus tenderness  Eyes   Conjunctiva and lids: No swelling, erythema or discharge  Pupils and irises: Equal, round, reactive to light      Ears, Nose, Mouth, and Throat   External inspection of ears and nose: Normal  Otoscopic examination: Tympanic membranes translucent with normal light reflex  Canals patent without erythema  Lips, teeth, and gums: Normal, good dentition  Oropharynx: Normal with no erythema, edema, exudate or lesions  Neck   Neck: Supple, symmetric, trachea midline, no masses  Thyroid: Normal, no thyromegaly  Pulmonary   Auscultation of lungs: Clear to auscultation  Cardiovascular   Auscultation of heart: Normal rate and rhythm, normal S1 and S2, no murmurs  Carotid pulses: 2+ bilaterally  Pedal pulses: 2+ bilaterally  Examination of extremities for edema and/or varicosities: Normal     Abdomen   Abdomen: Non-tender, no masses  Liver and spleen: No hepatomegaly or splenomegaly  Lymphatic   Palpation of lymph nodes in neck: No lymphadenopathy  Musculoskeletal   Gait and station: Normal     Digits and nails: Normal without clubbing or cyanosis  Results/Data  PHQ-2 Adult Depression Screening 26Apr2017 09:07AM User, [facility]     Test Name Result Flag Reference   PHQ-2 Adult Depression Score 0     Over the last two weeks, how often have you been bothered by any of the following problems? Little interest or pleasure in doing things: Not at all - 0  Feeling down, depressed, or hopeless: Not at all - 0   PHQ-2 Adult Depression Screening Negative         Future Appointments    Date/Time Provider Specialty Site   07/12/2017 11:15 AM SYDNEY Teran  [facility] On[facility] [place] Notice   07/12/2017 02:30 PM YSDNEY Murray   Bariatric Medicine [facility]     Signatures   Electronically signed by : Tayla Figueroa MD; Apr 26 2017  1:11PM EST                       (Author)

## 2018-01-18 NOTE — CONSULTS
Chief Complaint  Chief Complaint Free Text Note Form: Patient is here today for MWM Consultation  Stop Ban/8      History of Present Illness  Free Text HPI: Obesity-  Severity: severe  Onset: since   Modifiers: WW-lost 40lbs, sustained until 2nd pregnancy and other life events caused weight gain, seroquel-gained 40-50lbs   Associated Symptoms: wants to be healthier, borderline BP    B-skips 4 days, eats 2 eggs w/ 2 slices of toast, or bagel w/ cream cheese + coffee  L- skips 3 times, sandwich-2 slices of cheese and goldfish w/ water or unsweetened tea or 2% milk  D-lean protein w/ non-starchy veggies and starch-larger  S- sometimes snacks on pretzels or leftovers if still awake after she takes seroquel  Drinks:black coffee or water or unsweetened tea 1 glass of 2% milk    Exercise: none for the past 8 mos  Past Medical History    1  History of Benign cyst of breast (610 9) (N60 09)   2  History of pilonidal cyst (V13 3) (Z87 2)   3  History of pregnancy (V13 29)   4  History of spontaneous  (V13 29) (Z87 59)   5  History of Menarche (V21 8)   6  History of Sinus problem (473 9) (J34 9)  Active Problems And Past Medical History Reviewed: The active problems and past medical history were reviewed and updated today  Assessment    1  Panic attacks (300 01) (F41 0)   2  Morbid obesity (278 01) (E66 01)   3  Anxiety (300 00) (F41 9)   4  OCD (obsessive compulsive disorder) (300 3) (F42 9)   5  IBS (irritable bowel syndrome) (564 1) (K58 9)    Discussion/Summary  Discussion Summary:   37 yo female w/ OCD/anxiety/panic attacks morbid obesity here to pursue medical weight management to improve weight and health      Obesity class 3:  -discussed options of conservative vs BLAKE program +/- meal replacement vs VLCD and bariatric surgery  -initial focus of 5-10% weight loss over 3-6 mos for improved health  -screening labs-completed 2017 and within acceptable limits  -Mainly due to insufficient dietary and exercise modification-skips meals, insufficient protein    Anxiety/panic attacks/OCD:  -Follows with psychiatry-on Seroquel, Klonopin, fluoxetine    IBS has improved with dietary changes and avoidance of trigger foods    Patient wishes to start new direction-intensive lifestyle intervention program  We will set up visit with dietitian will formally enroll her into the program and I will see her at the conclusion of the 12 week program to reassess treatment goals  Patient's Capacity to Self-Care: Patient is able to Self-Care  Understands and agrees with treatment plan: The treatment plan was reviewed with the patient/guardian  The patient/guardian understands and agrees with the treatment plan   Self Referrals:   Self Referrals: No      Review of Systems  Focused-Female:   Constitutional: no fever  ENT: no sore throat  Cardiovascular: no chest pain and no palpitations  Respiratory: no shortness of breath and no wheezing  Gastrointestinal: no abdominal pain, no constipation and no diarrhea  Genitourinary: no dysuria and no incontinence  Musculoskeletal: no arthralgias  Integumentary: no rashes  Neurological: no headache  Other Symptoms: Psych: +anxiety/OCD  Active Problems    1  Acute sinusitis (461 9) (J01 90)   2  Anxiety (300 00) (F41 9)   3  Dense breast tissue (793 82) (R92 2)   4  Encounter for breast cancer screening other than mammogram (V76 10) (Z12 39)   5  Encounter for routine gynecological examination (V72 31) (Z01 419)   6  Encounter for screening mammogram for malignant neoplasm of breast (V76 12)   (Z12 31)   7  IBS (irritable bowel syndrome) (564 1) (K58 9)   8  Morbid obesity (278 01) (E66 01)   9  OCD (obsessive compulsive disorder) (300 3) (F42 9)   10  Panic attacks (300 01) (F41 0)   11  Pap smear, as part of routine gynecological examination (V76 2) (Z01 419)   12  Seasonal allergies (477 9) (J30 2)   13   Vitamin D deficiency (268 9) (E55 9)    Surgical History    1  History of Biopsy Skin   2  History of Breast Surgery Excision Of Breast Single Lesion   3  History of Dilation And Curettage   4  History of Oral Surgery   5  History of Pilonidal Cyst Resection  Surgical History Reviewed: The surgical history was reviewed and updated today  Family History  Mother    1  Family history of Colon carcinoma   2  Family history of diabetes mellitus (V18 0) (Z83 3)   3  Family history of gallbladder disease (V18 59) (Z83 79)   4  Family history of hypertension (V17 49) (Z82 49)   5  Family history of irritable bowel syndrome (V18 59) (Z83 79)   6  Family history of Recurrent kidney stones  Father    7  Family history of diabetes mellitus (V18 0) (Z83 3)   8  Family history of hypertension (V17 49) (Z82 49)  Brother    5  Family history of Melanoma  Grandmother    8  Family history of irritable bowel syndrome (V18 59) (Z83 79)  Maternal Grandmother    6  Family history of malignant neoplasm of breast (V16 3) (Z80 3)  Paternal Grandmother    15  Family history of malignant neoplasm of stomach (V16 0) (Z80 0)   13  Family history of Ovarian cancer  Maternal Uncle    15  Family history of Malignant neoplasm of pancreas, unspecified location of malignancy  Family History Reviewed: The family history was reviewed and updated today  Social History    · Always uses seat belt   · Daily caffeine consumption   · Never a smoker   · No alcohol use   · No drug use  Social History Reviewed: The social history was reviewed and updated today  Current Meds   1  Allegra CAPS; Therapy: (Recorded:93Wwm5589) to Recorded   2  ClonazePAM 0 5 MG Oral Tablet; Take 1 tablet daily; Therapy: 49TII2404 to (Evaluate:12Cjw8413); Last Rx:26Apr2017 Ordered   3  FLUoxetine HCl - 20 MG Oral Capsule; TAKE ONE CAPSULE BY MOUTH EVERY DAY;   Last Rx:26Apr2017 Ordered   4  QUEtiapine Fumarate ER 50 MG Oral Tablet Extended Release 24 Hour; TAKE 1 TABLET   DAILY;  Last Rx:26Apr2017 Ordered   5  Vitamin D3 2000 UNIT Oral Capsule; Therapy: (Recorded:28Dqv5761) to Recorded  Medication List Reviewed: The medication list was reviewed and updated today  Allergies    1  Bactrim DS TABS   2  quetiapine   3  sulfa    Vitals  Vital Signs    Recorded: 83Buc7075 02:41PM   Temperature 100 F, Tympanic   Heart Rate 84, L Radial   Pulse Quality Normal, L Radial   Systolic 065, LUE, Sitting   Diastolic 84, LUE, Sitting   BP CUFF SIZE Extra-Large   Height 5 ft 1 5 in   Weight 235 lb 8 0 oz   BMI Calculated 43 78   BSA Calculated 2 04   Waist Circumference 46 in  Neck Circumference 15 in  Physical Exam    Constitutional   General appearance: Abnormal   well developed and morbidly obese  Eyes No conjunctival pallor  Ears, Nose, Mouth, and Throat Moist oral mucosa  Pulmonary   Respiratory effort: No increased work of breathing or signs of respiratory distress  Auscultation of lungs: Clear to auscultation  Cardiovascular   Auscultation of heart: Normal rate and rhythm, normal S1 and S2, without murmurs  Trace edema bilaterally  Abdomen   Abdomen: Abnormal   The abdomen was obese  The abdomen was soft and nontender  Musculoskeletal   Gait and station: Normal     Psychiatric   Orientation to person, place, and time: Normal          Results/Data  STOP BANG Questionnaire [id number] 03:33PM User, Ahs     Test Name Result Flag Reference   STOP BANG Questionnaire Risk of SHARON Low Risk     Snoring: No  Tired: No  Observed: No  Blood Pressure: No  BMI: Yes  Age: No  Neck Circumference: No  Gender: No   STOP BANG Questionnaire SHARON Total Score 1     Snoring: No  Tired: No  Observed: No  Blood Pressure: No  BMI: Yes  Age: No  Neck Circumference: No  Gender: No       Future Appointments    Date/Time Provider Specialty Site   07/23/2018 01:45 PM SYDNEY Patrick   Surgical Oncology [facility] [place]   07/13/2017 01:00 PM Serafin Wick [address] [place] [address] [facility]   09/20/2017 10:00 AM Shelbi Ferrell     Signatures   Electronically signed by : SYDNEY Powell ; Jul 12 2017  4:03PM EST                       (Author)

## 2018-01-18 NOTE — PROGRESS NOTES
History of Present Illness  HPI: Suraj Ponce is here for 2 wk f/u  Current wt: 225 7 lbs  Loss of 7 9 lbs x 2 wks  Tracking and consuming 1100 calories/day  Finding it easy to follow the meal plan and get into a routine  Switched to 1% milk as recommended  Addressed upcoming birthday party  Bariatric MNT [facility]:   Weight Assessment: IBW: 107 5 lbs, ABW: 137 lbs, Weight Change: 7 9 lb loss x 2 wks, Highest Weight: 242 lbs, Lowest Weight: 150 lbs, Goal Weight: 210-222 lbs  Weight loss attempts: Weight Watchers: 40 lbs  Excess calories come from large portions at mealtimes and high calorie high fat food choices  Dietary Recall:   Breakfast: replacement  Snack: skip  Lunch: replacement OR saladworks greek salad or mediteranean w/dressing on side  Snack: replacement  Dinner: lean protein, veggie, carb  Snack: bar   Beverages: water  Estimated fluid intake: 64 oz  Alcohol consumption: occasional    Food allergies/intolerances: n/a  Cooking: self  Shopping: self  She dines out 1-2 times per week  Exercise Frequency:  She exercises tennis 30 min/day, swimming  Her obesity/being overweight is related to excess energy intake and as evidenced by BMI=42 0  Nutrition Prescription: Estimated calories for weight loss: eCalc BMR 1624, wt loss w/o exercise 949-1449, w/exercise 9676-0460, Estimated daily protein needs: 59-73 gm (1 2-1 5 gm/kg IBW) and Estimated daily fluid needs: 57 oz (35 cc/kg IBW)  Nutrition Intervention: Counseling provided with emphasis on meal planning, portion sizes, healthy snack choices, hydration, protein intake, exercise and food journaling  Education materials provided: [facility] [employer]irection manual    Comprehension: good  Expected Compliance: good     Goals: follow meal plan prescribed, plan meals and snacks daily, Choose lower-calorie, lower-fat meal options at home and when dining out, food journal, do not skip meals or snacks and 1032-5184 calories using 1-2 replacements and 0-1 bar  Monitor/Evaluation: weekly weight, food journal, fluid intake and exercise level  Active Problems    1  Acute sinusitis (461 9) (J01 90)   2  Anxiety (300 00) (F41 9)   3  Dense breast tissue (793 82) (R92 2)   4  Encounter for breast cancer screening other than mammogram (V76 10) (Z12 39)   5  Encounter for routine gynecological examination (V72 31) (Z01 419)   6  Encounter for screening mammogram for malignant neoplasm of breast (V76 12)   (Z12 31)   7  IBS (irritable bowel syndrome) (564 1) (K58 9)   8  Morbid obesity (278 01) (E66 01)   9  OCD (obsessive compulsive disorder) (300 3) (F42 9)   10  Panic attacks (300 01) (F41 0)   11  Pap smear, as part of routine gynecological examination (V76 2) (Z01 419)   12  Seasonal allergies (477 9) (J30 2)   13  Vitamin D deficiency (268 9) (E55 9)    Past Medical History    1  History of Benign cyst of breast (610 9) (N60 09)   2  History of pilonidal cyst (V13 3) (Z87 2)   3  History of pregnancy (V13 29)   4  History of spontaneous  (V13 29) (Z87 59)   5  History of Menarche (V21 8)   6  History of Sinus problem (473 9) (J34 9)    Surgical History    1  History of Biopsy Skin   2  History of Breast Surgery Excision Of Breast Single Lesion   3  History of Dilation And Curettage   4  History of Oral Surgery   5  History of Pilonidal Cyst Resection    Family History  Mother    1  Family history of Colon carcinoma   2  Family history of diabetes mellitus (V18 0) (Z83 3)   3  Family history of gallbladder disease (V18 59) (Z83 79)   4  Family history of hypertension (V17 49) (Z82 49)   5  Family history of irritable bowel syndrome (V18 59) (Z83 79)   6  Family history of Recurrent kidney stones  Father    7  Family history of diabetes mellitus (V18 0) (Z83 3)   8  Family history of hypertension (V17 49) (Z82 49)  Brother    5  Family history of Melanoma  Grandmother    8   Family history of irritable bowel syndrome (V18 59) (Z83 79)  Maternal Grandmother    11  Family history of malignant neoplasm of breast (V16 3) (Z80 3)  Paternal Grandmother    15  Family history of malignant neoplasm of stomach (V16 0) (Z80 0)   13  Family history of Ovarian cancer  Maternal Uncle    15  Family history of Malignant neoplasm of pancreas, unspecified location of malignancy    Social History    · Always uses seat belt   · Daily caffeine consumption   · Never a smoker   · No alcohol use   · No drug use    Current Meds   1  Allegra CAPS; Therapy: (Recorded:02Ass3443) to Recorded   2  ClonazePAM 0 5 MG Oral Tablet; Take 1 tablet daily; Therapy: 11IQB3975 to (Evaluate:86Znr7426); Last Rx:26Apr2017 Ordered   3  FLUoxetine HCl - 20 MG Oral Capsule; TAKE ONE CAPSULE BY MOUTH EVERY DAY;   Last Rx:64Orn5211 Ordered   4  QUEtiapine Fumarate ER 50 MG Oral Tablet Extended Release 24 Hour; TAKE 1 TABLET   DAILY; Last Rx:13Qnn7764 Ordered   5  Vitamin D3 2000 UNIT Oral Capsule; Therapy: (Recorded:06Wsh6155) to Recorded    Allergies    1  Bactrim DS TABS   2  quetiapine   3  sulfa    Vitals  Signs   Recorded: 14WZZ0017 03:22PM   Height: 5 ft 1 5 in  Weight: 225 lb 11 2 oz  BMI Calculated: 41 96  BSA Calculated: 2    Future Appointments    Date/Time Provider Specialty Site   07/23/2018 01:45 PM SYDNEY Gonzalez  [facility]   10/18/2017 10:45 AM SYDNEY Jane  Bariatric Medicine [facility]   09/20/2017 10:00 AM Shelbi Tobias     Signatures   Electronically signed by :  José Couch, ; Jul 25 2017  3:27PM EST                       (Author)    Electronically signed by : SYDNEY Jenkins ; Jul 25 2017  3:28PM EST                       (Co-author)

## 2018-01-22 VITALS — HEIGHT: 62 IN | BODY MASS INDEX: 35.66 KG/M2 | WEIGHT: 193.8 LBS

## 2018-01-22 VITALS
HEIGHT: 62 IN | SYSTOLIC BLOOD PRESSURE: 134 MMHG | TEMPERATURE: 100 F | DIASTOLIC BLOOD PRESSURE: 84 MMHG | HEART RATE: 84 BPM | BODY MASS INDEX: 43.34 KG/M2 | WEIGHT: 235.5 LBS

## 2018-01-22 VITALS — BODY MASS INDEX: 41.54 KG/M2 | WEIGHT: 225.7 LBS | HEIGHT: 62 IN

## 2018-01-22 VITALS
HEIGHT: 62 IN | HEART RATE: 62 BPM | SYSTOLIC BLOOD PRESSURE: 120 MMHG | DIASTOLIC BLOOD PRESSURE: 76 MMHG | BODY MASS INDEX: 36.75 KG/M2 | WEIGHT: 199.7 LBS | TEMPERATURE: 98.2 F

## 2018-01-22 VITALS
WEIGHT: 227.38 LBS | HEIGHT: 61 IN | SYSTOLIC BLOOD PRESSURE: 138 MMHG | BODY MASS INDEX: 42.93 KG/M2 | DIASTOLIC BLOOD PRESSURE: 84 MMHG

## 2018-01-24 VITALS — HEIGHT: 62 IN | BODY MASS INDEX: 34.08 KG/M2 | WEIGHT: 185.2 LBS

## 2018-02-05 ENCOUNTER — TELEPHONE (OUTPATIENT)
Dept: FAMILY MEDICINE CLINIC | Facility: CLINIC | Age: 43
End: 2018-02-05

## 2018-02-05 DIAGNOSIS — Z20.828 EXPOSURE TO THE FLU: Primary | ICD-10-CM

## 2018-02-06 DIAGNOSIS — Z20.828 EXPOSURE TO THE FLU: ICD-10-CM

## 2018-02-06 DIAGNOSIS — Z20.828 EXPOSURE TO THE FLU: Primary | ICD-10-CM

## 2018-02-06 RX ORDER — OSELTAMIVIR PHOSPHATE 75 MG/1
75 CAPSULE ORAL DAILY
Qty: 10 CAPSULE | Refills: 0 | Status: SHIPPED | OUTPATIENT
Start: 2018-02-06 | End: 2018-02-16

## 2018-02-06 RX ORDER — OSELTAMIVIR PHOSPHATE 75 MG/1
75 CAPSULE ORAL DAILY
Qty: 10 CAPSULE | Refills: 0 | Status: SHIPPED | OUTPATIENT
Start: 2018-02-06 | End: 2018-02-06 | Stop reason: SDUPTHER

## 2018-02-11 RX ORDER — FLUOXETINE HYDROCHLORIDE 20 MG/1
20 CAPSULE ORAL
COMMUNITY
Start: 2018-01-30

## 2018-02-11 RX ORDER — CLONAZEPAM 0.5 MG/1
0.5 TABLET ORAL
COMMUNITY
Start: 2018-01-30

## 2018-02-11 RX ORDER — OSELTAMIVIR PHOSPHATE 75 MG/1
75 CAPSULE ORAL DAILY
Qty: 10 CAPSULE | Refills: 0 | Status: SHIPPED | OUTPATIENT
Start: 2018-02-11 | End: 2018-02-21

## 2018-02-26 NOTE — RESULT NOTES
Verified Results  [facility] (ABUS) [id number] 10:32AM Miguel Ángel Chase     Test Name Result Flag Reference   US BREAST BILATERAL ABUS (Report)     Patient History:   Patient had first child at age 31  Family history of breast cancer at age 55 in maternal    grandmother, ovarian cancer at age 55 in paternal grandmother,    pancreatic cancer at age 77 in maternal uncle  Excisional biopsy of the right breast, 2001  Patient has never smoked  Patient's BMI is 40 2  Reason for exam: screening, asymptomatic    71-year-old female with family history of breast and ovarian    cancer  US Breast Screening (ABUS): January 10, 2018 - Check In #:    [de-identified]   Standard views  Technologist: Handy Silva RDMS   Heterogeneity can be either focal or diffuse  The breast    echotexture is characterized by multiple small areas of increased   and decreased echogenicity  Shadowing may occur at the    interfaces of the fat lobules and parenchyma  This pattern occurs   in younger breasts and those with heterogeneously dense    parenchyma depicted mammographically  Automated breast    ultrasound images were obtained on the [employer] somo-v ABUS    system  Imaging was obtained in standard projections including AP,    lateral and medial for both breasts  At the 3 o'clock position of the left breast, 7 cm deep to the    nipple, there is a 2 cm mixed echogenicity, partially cystic,    nodule which I suspect represents a cluster of cysts/complicated    cyst  However, I would recommend dedicated hand-held ultrasound    to confirm this impression  No other cystic or solid nodules are seen  Automated breast ultrasound is an adjunct, not a replacement, for   routine yearly screening mammography  1  2 cm probable complicated cyst/cluster of cysts at the 3    o'clock position of the left breast  Dedicated hand-held    ultrasound is recommended to confirm this impression     2  Unremarkable right breast ultrasound  ACR BI-RADSï¾® Assessments: BiRad:0 - Incomplete: needs additional    imaging evaluation - Left   A breast care nurse from our facility will be contacting the    patient regarding the need for additional imaging  Recommendation:   Ultrasound of the left breast      The patient is scheduled in a reminder system for screening    mammography  Transcription Location: RATNA Santos 98: [id number]     Risk Value(s):   Tyrer-Cuzick 10 Year: 3 100%, Tyrer-Cuzick Lifetime: 18 200%,    Myriad Table: 7 2%, ALYSE 5 Year: 1 7%, NCI Lifetime: 17 0%   Signed by:   Kirk Ballesteros MD   1/10/18       Plan  Abnormal finding on breast imaging    · *US BREAST LEFT LIMITED (DIAGNOSTIC);  Status:Hold For - Scheduling; Requested  NUA:[id number];

## 2018-03-21 PROBLEM — E66.01 SEVERE OBESITY (BMI 35.0-39.9): Status: ACTIVE | Noted: 2017-04-26

## 2018-03-21 PROBLEM — R92.8 ABNORMAL FINDING ON BREAST IMAGING: Status: ACTIVE | Noted: 2018-01-11

## 2018-05-01 ENCOUNTER — OFFICE VISIT (OUTPATIENT)
Dept: FAMILY MEDICINE CLINIC | Facility: CLINIC | Age: 43
End: 2018-05-01
Payer: COMMERCIAL

## 2018-05-01 ENCOUNTER — TRANSCRIBE ORDERS (OUTPATIENT)
Dept: LAB | Facility: CLINIC | Age: 43
End: 2018-05-01

## 2018-05-01 ENCOUNTER — APPOINTMENT (OUTPATIENT)
Dept: LAB | Facility: CLINIC | Age: 43
End: 2018-05-01
Payer: COMMERCIAL

## 2018-05-01 VITALS
BODY MASS INDEX: 31.98 KG/M2 | WEIGHT: 173.8 LBS | SYSTOLIC BLOOD PRESSURE: 100 MMHG | HEIGHT: 62 IN | DIASTOLIC BLOOD PRESSURE: 70 MMHG

## 2018-05-01 DIAGNOSIS — Z00.00 ROUTINE HEALTH MAINTENANCE: Primary | ICD-10-CM

## 2018-05-01 DIAGNOSIS — F42.9 OBSESSIVE-COMPULSIVE DISORDER, UNSPECIFIED TYPE: ICD-10-CM

## 2018-05-01 DIAGNOSIS — E66.09 CLASS 1 OBESITY DUE TO EXCESS CALORIES WITHOUT SERIOUS COMORBIDITY WITH BODY MASS INDEX (BMI) OF 31.0 TO 31.9 IN ADULT: ICD-10-CM

## 2018-05-01 PROBLEM — E66.811 CLASS 1 OBESITY WITHOUT SERIOUS COMORBIDITY WITH BODY MASS INDEX (BMI) OF 31.0 TO 31.9 IN ADULT: Status: ACTIVE | Noted: 2017-04-26

## 2018-05-01 PROBLEM — E66.9 CLASS 1 OBESITY WITHOUT SERIOUS COMORBIDITY WITH BODY MASS INDEX (BMI) OF 31.0 TO 31.9 IN ADULT: Status: ACTIVE | Noted: 2017-04-26

## 2018-05-01 LAB
ALBUMIN SERPL BCP-MCNC: 4 G/DL (ref 3.5–5)
ALP SERPL-CCNC: 68 U/L (ref 46–116)
ALT SERPL W P-5'-P-CCNC: 12 U/L (ref 12–78)
ANION GAP SERPL CALCULATED.3IONS-SCNC: 5 MMOL/L (ref 4–13)
AST SERPL W P-5'-P-CCNC: 7 U/L (ref 5–45)
BILIRUB SERPL-MCNC: 0.6 MG/DL (ref 0.2–1)
BUN SERPL-MCNC: 14 MG/DL (ref 5–25)
CALCIUM SERPL-MCNC: 9 MG/DL (ref 8.3–10.1)
CHLORIDE SERPL-SCNC: 105 MMOL/L (ref 100–108)
CHOLEST SERPL-MCNC: 130 MG/DL (ref 50–200)
CO2 SERPL-SCNC: 30 MMOL/L (ref 21–32)
CREAT SERPL-MCNC: 0.59 MG/DL (ref 0.6–1.3)
GFR SERPL CREATININE-BSD FRML MDRD: 112 ML/MIN/1.73SQ M
GLUCOSE P FAST SERPL-MCNC: 91 MG/DL (ref 65–99)
HDLC SERPL-MCNC: 46 MG/DL (ref 40–60)
LDLC SERPL CALC-MCNC: 71 MG/DL (ref 0–100)
NONHDLC SERPL-MCNC: 84 MG/DL
POTASSIUM SERPL-SCNC: 4.5 MMOL/L (ref 3.5–5.3)
PROT SERPL-MCNC: 7.5 G/DL (ref 6.4–8.2)
SODIUM SERPL-SCNC: 140 MMOL/L (ref 136–145)
TRIGL SERPL-MCNC: 67 MG/DL

## 2018-05-01 PROCEDURE — 36415 COLL VENOUS BLD VENIPUNCTURE: CPT

## 2018-05-01 PROCEDURE — 99396 PREV VISIT EST AGE 40-64: CPT | Performed by: FAMILY MEDICINE

## 2018-05-01 PROCEDURE — 80053 COMPREHEN METABOLIC PANEL: CPT

## 2018-05-01 PROCEDURE — 80061 LIPID PANEL: CPT

## 2018-05-01 RX ORDER — QUETIAPINE FUMARATE 50 MG/1
50 TABLET, FILM COATED ORAL
COMMUNITY
End: 2018-08-01 | Stop reason: SINTOL

## 2018-05-01 NOTE — PATIENT INSTRUCTIONS
OCD (obsessive compulsive disorder)    She is planning on changing psychiatrist and possibly going to [place] for visits  We will try to obtain prior auth and if not possible we can discuss changing medication to help her out until she gets in with new psychiatrist       Class 1 obesity without serious comorbidity with body mass index (BMI) of 31 0 to 31 9 in adult    I congratulated her on weight loss and urged her to keep up the good work

## 2018-05-01 NOTE — PROGRESS NOTES
Assessment/Plan:    OCD (obsessive compulsive disorder)    She is planning on changing psychiatrist and possibly going to [place] for visits  We will try to obtain prior auth and if not possible we can discuss changing medication to help her out until she gets in with new psychiatrist       Class 1 obesity without serious comorbidity with body mass index (BMI) of 31 0 to 31 9 in adult    I congratulated her on weight loss and urged her to keep up the good work  Diagnoses and all orders for this visit:    Routine health maintenance    Class 1 obesity due to excess calories without serious comorbidity with body mass index (BMI) of 31 0 to 31 9 in adult  -     Lipid panel; Future  -     Comprehensive metabolic panel; Future    Obsessive-compulsive disorder, unspecified type    Other orders  -     QUEtiapine (SEROquel) 50 mg tablet; Take 50 mg by mouth daily at bedtime          Subjective:      Patient ID: Lion Billy is a 39 y o  female  She is here for physical exam   She sees Dr Les Aragon   For Psychiatry for treatment of anxiety, panic attacks and OCD  Several years ago she had an allergic reaction to quetiapine short-acting medication  She had broken out in hives and had to be treated for allergic reaction  This occurred in May 2011  She was then started on brand-name Seroquel short-acting medication and tolerated that without any allergies, but her insurance stopped covering it  without prior authorization  She was switched to generic quetiapine extended release 24 hr 50 mg tablets and has done well with that, but now this needs prior authorization as well  Her psychiatry office tried to do prior authorization but it was rejected, and they asked her to call herself  Apparently her psychiatrist never submitted photos of allergic rxn and the insurance wanted photos of hives  She has lost 60 lbs since last summer with weight management  She is eating healthier and walking regularly    She also does some weight training  She feels well and much better at the lower weight  UTD with dentist and eye doctor      There is a strong family history of breast cancer  She sees Dr Heike Rodriguez regularly for breast exam and she is getting mammogram on a yearly basis and ultrasound in the interim  She sees Dr Kirstie Dutton for gyn  The following portions of the patient's history were reviewed and updated as appropriate: allergies, current medications, past family history, past medical history, past social history, past surgical history and problem list     Review of Systems   Constitutional: Negative for activity change, chills, fatigue and fever  HENT: Negative for congestion, ear pain, sinus pressure and sore throat  Eyes: Negative for pain and visual disturbance  Respiratory: Negative for cough, chest tightness, shortness of breath and wheezing  Cardiovascular: Negative for chest pain, palpitations and leg swelling  Gastrointestinal: Negative for abdominal pain, blood in stool, constipation, diarrhea, nausea and vomiting  Endocrine: Negative for polydipsia and polyuria  Genitourinary: Negative for difficulty urinating, dysuria, frequency and urgency  Musculoskeletal: Negative for arthralgias, joint swelling and myalgias  Skin: Negative for rash  Neurological: Negative for dizziness, weakness, numbness and headaches  Hematological: Negative for adenopathy  Does not bruise/bleed easily  Psychiatric/Behavioral: Negative for dysphoric mood  The patient is nervous/anxious  Objective:      /70 (BP Location: Left arm, Patient Position: Sitting, Cuff Size: Standard)   Ht 5' 2" (1 575 m)   Wt 78 8 kg (173 lb 12 8 oz)   BMI 31 79 kg/m²          Physical Exam   Nursing note and vitals reviewed

## 2018-05-01 NOTE — ASSESSMENT & PLAN NOTE
She is planning on changing psychiatrist and possibly going to [place] for visits    We will try to obtain prior auth and if not possible we can discuss changing medication to help her out until she gets in with new psychiatrist

## 2018-05-01 NOTE — LETTER
May 1, 2018     Patient: Gilma Dowell   YOB: 1975   Date of Visit: 5/1/2018       To Whom it May Concern:    Mateo Torre is under my professional care for situational anxiety and tension headaches  I have advised her to be out of work from 4/10/18 to 6/4/18  She was seen in my office on 5/1/2018  If you have any questions or concerns, please don't hesitate to call           Sincerely,          Erin Zhang MD        CC: No Recipients

## 2018-05-08 ENCOUNTER — TELEPHONE (OUTPATIENT)
Dept: FAMILY MEDICINE CLINIC | Facility: CLINIC | Age: 43
End: 2018-05-08

## 2018-05-08 NOTE — TELEPHONE ENCOUNTER
Pt called and just wanted to say thank you and let you know that she was able to obtain her medication through [employer]  Pt states it was a little more than what was listed on the site, but that it was still cheaper than even what she paid when she had insurance last year

## 2018-05-17 ENCOUNTER — TRANSCRIBE ORDERS (OUTPATIENT)
Dept: ADMINISTRATIVE | Facility: HOSPITAL | Age: 43
End: 2018-05-17

## 2018-05-17 DIAGNOSIS — Z09 FOLLOW-UP EXAM, 3-6 MONTHS SINCE PREVIOUS EXAM: Primary | ICD-10-CM

## 2018-07-12 DIAGNOSIS — Z12.31 ENCOUNTER FOR SCREENING MAMMOGRAM FOR MALIGNANT NEOPLASM OF BREAST: ICD-10-CM

## 2018-07-13 ENCOUNTER — HOSPITAL ENCOUNTER (OUTPATIENT)
Dept: MAMMOGRAPHY | Facility: CLINIC | Age: 43
Discharge: HOME | End: 2018-07-13
Payer: COMMERCIAL

## 2018-07-13 ENCOUNTER — HOSPITAL ENCOUNTER (OUTPATIENT)
Dept: ULTRASOUND IMAGING | Facility: CLINIC | Age: 43
Discharge: HOME | End: 2018-07-13
Payer: COMMERCIAL

## 2018-07-13 DIAGNOSIS — Z12.31 ENCOUNTER FOR SCREENING MAMMOGRAM FOR MALIGNANT NEOPLASM OF BREAST: ICD-10-CM

## 2018-07-13 DIAGNOSIS — Z09 FOLLOW-UP EXAM, 3-6 MONTHS SINCE PREVIOUS EXAM: ICD-10-CM

## 2018-07-13 PROCEDURE — 77063 BREAST TOMOSYNTHESIS BI: CPT

## 2018-07-13 PROCEDURE — 76642 ULTRASOUND BREAST LIMITED: CPT

## 2018-07-13 PROCEDURE — 77067 SCR MAMMO BI INCL CAD: CPT

## 2018-07-23 ENCOUNTER — OFFICE VISIT (OUTPATIENT)
Dept: SURGICAL ONCOLOGY | Facility: CLINIC | Age: 43
End: 2018-07-23
Payer: COMMERCIAL

## 2018-07-23 VITALS
DIASTOLIC BLOOD PRESSURE: 70 MMHG | TEMPERATURE: 99.2 F | BODY MASS INDEX: 29.74 KG/M2 | WEIGHT: 161.6 LBS | SYSTOLIC BLOOD PRESSURE: 116 MMHG | HEART RATE: 70 BPM | HEIGHT: 62 IN

## 2018-07-23 DIAGNOSIS — Z12.31 SCREENING MAMMOGRAM, ENCOUNTER FOR: ICD-10-CM

## 2018-07-23 DIAGNOSIS — R92.8 ABNORMAL FINDING ON BREAST IMAGING: ICD-10-CM

## 2018-07-23 DIAGNOSIS — Z12.39 BREAST CANCER SCREENING OTHER THAN MAMMOGRAM: ICD-10-CM

## 2018-07-23 DIAGNOSIS — Z13.71 BRCA NEGATIVE: ICD-10-CM

## 2018-07-23 DIAGNOSIS — R92.2 DENSE BREAST TISSUE: ICD-10-CM

## 2018-07-23 DIAGNOSIS — Z80.3 FAMILY HISTORY OF BREAST CANCER IN FEMALE: Primary | ICD-10-CM

## 2018-07-23 PROCEDURE — 99214 OFFICE O/P EST MOD 30 MIN: CPT | Performed by: SURGERY

## 2018-07-23 NOTE — PROGRESS NOTES
Surgical Oncology Follow Up       [address]  [facility]  1975  [id number]  [address]  [facility] [address] [address]    Chief Complaint   Patient presents with    Follow-up     1 year       Assessment/Plan   Diagnoses and all orders for this visit:    Family history of breast cancer in female    Dense breast tissue  -     US breast screening bilateral complete (ABUS); Future    Abnormal finding on breast imaging  -     US breast left limited (diagnostic); Future  -     US breast left limited (diagnostic); Future    Screening mammogram, encounter for  -     Mammo screening bilateral w 3d & cad; Future    Breast cancer screening other than mammogram  -     US breast screening bilateral complete (ABUS); Future    BRCA negative        Advance Care Planning/Advance Directives:  Did not discuss  with the patient  Oncology History:     No history exists  History of Present Illness: follow up  -Interval History: none    Review of Systems:  Review of Systems   Constitutional: Negative  Negative for appetite change and fever  Eyes: Negative  Respiratory: Negative for shortness of breath  Cardiovascular: Negative  Gastrointestinal: Negative  Endocrine: Negative  Genitourinary: Negative  Musculoskeletal: Negative  Negative for arthralgias and myalgias  Skin: Negative  Allergic/Immunologic: Negative  Neurological: Negative  Hematological: Negative  Negative for adenopathy  Does not bruise/bleed easily  Psychiatric/Behavioral: Negative          Patient Active Problem List   Diagnosis    Abnormal finding on breast imaging    Anxiety    Dense breast tissue    IBS (irritable bowel syndrome)    OCD (obsessive compulsive disorder)    Panic attacks    Seasonal allergies    Class 1 obesity without serious comorbidity with body mass index (BMI) of 31 0 to 31 9 in adult    Vitamin D deficiency    Family history of breast cancer in female    Screening mammogram, encounter for    Breast cancer screening other than mammogram    BRCA negative     Past Medical History:   Diagnosis Date    Acute sinusitis     Anxiety     Dense breast tissue     History of early menarche     AGE 14    IBS (irritable bowel syndrome)     OCD (obsessive compulsive disorder)     Panic attacks     Pilonidal cyst     Seasonal allergies     Sinus problem     Spontaneous      Vitamin D deficiency      Past Surgical History:   Procedure Laterality Date    BREAST SURGERY      EXCISION OF BREAST SINGLE LESION     DILATION AND CURETTAGE, DIAGNOSTIC / THERAPEUTIC      MOUTH SURGERY      WISDOM TEETH 1996    OTHER SURGICAL HISTORY      PILONIDAL CYST RESECTION    SKIN BIOPSY       Family History   Problem Relation Age of Onset    Colon cancer Mother         AGE 62     Diabetes Mother     Gallbladder disease Mother     Hypertension Mother     Irritable bowel syndrome Mother     Kidney disease Mother         RECURRENT    Diabetes Father     Hypertension Father     Melanoma Brother         AGE 37    Breast cancer Maternal Grandmother         AGE 55     Stomach cancer Paternal Grandmother         AGE 55     Ovarian cancer Paternal Grandmother         AGE 55     Irritable bowel syndrome Other     Pancreatic cancer Maternal Uncle         UNSPECIFIED LOACTION OF MALIGNANCY      Social History     Social History    Marital status: /Civil Union     Spouse name: N/A    Number of children: N/A    Years of education: N/A     Occupational History    Not on file       Social History Main Topics    Smoking status: Never Smoker    Smokeless tobacco: Never Used    Alcohol use No    Drug use: No    Sexual activity: Not on file     Other Topics Concern    Not on file     Social History Narrative    ALWAYS USES SEAT BELT     DAILY CAFFEINE CONSUMPTION        Current Outpatient Prescriptions:     clonazePAM (KlonoPIN) 0 5 mg tablet, , Disp: , Rfl:     FLUoxetine (PROzac) 20 mg capsule, , Disp: , Rfl:     QUEtiapine (SEROquel) 50 mg tablet, Take 50 mg by mouth daily at bedtime, Disp: , Rfl:   Allergies   Allergen Reactions    Ibuprofen Hives    Quetiapine Hives     Annotation - [id number]: May have extended release Seraquil    Sulfa Antibiotics Hives and Rash    Sulfamethoxazole-Trimethoprim Hives and Rash       The following portions of the patient's history were reviewed and updated as appropriate: allergies, current medications, past family history, past medical history, past social history, past surgical history and problem list         Vitals:    07/23/18 1343   BP: 116/70   Pulse: 70   Temp: 99 2 °F (37 3 °C)       Physical Exam   Constitutional: She is oriented to person, place, and time  She appears well-developed and well-nourished  HENT:   Head: Normocephalic and atraumatic  Pulmonary/Chest: Right breast exhibits no inverted nipple, no mass, no nipple discharge, no skin change and no tenderness  Left breast exhibits no inverted nipple, no mass, no nipple discharge, no skin change and no tenderness  Lymphadenopathy:        Right axillary: No pectoral and no lateral adenopathy present  Left axillary: No pectoral and no lateral adenopathy present  Right: No supraclavicular adenopathy present  Left: No supraclavicular adenopathy present  Neurological: She is alert and oriented to person, place, and time  Psychiatric: She has a normal mood and affect           Results:  Labs:  Genetic testing is negative    Imaging  01/10/2018 automated breast ultrasound is a BI-RADS 0 for an area on the left side  01/12/2018 left breast diagnostic ultrasound is a BI-RADS three for an 11 millimeter lesios seen at left three o'clock likely a benign fibroadenoma  07/13/2018 bilateral screening mammogram is a BI-RADS 0 density of four left three o'clock lobulated mass for which another ultrasound was recommended  07/13/2018 diagnostic ultrasound of the left breast shows a stable mass in the left breast three o'clock likely a fibroadenoma six month follow-up ultrasound is recommended      I reviewed the above laboratory and imaging data  Discussion/Summary:  69-year-old female with dense breast tissue, fibrocystic changes and family history  She did have genetic testing and was negative  On her imaging this past year a lesion was noted in the left breast three o'clock axis  This is likely a benign fibroadenoma  Her examination is benign  I will make arrangements for her imaging for next year to include an automated breast ultrasound along with hand-held left breast follow-up ultrasound and then in one year, her bilateral screening mammogram along with a follow-up left breast ultrasound    I will see her again in one year for clinical exam or sooner should the need arise

## 2018-08-01 ENCOUNTER — OFFICE VISIT (OUTPATIENT)
Dept: FAMILY MEDICINE CLINIC | Facility: CLINIC | Age: 43
End: 2018-08-01
Payer: COMMERCIAL

## 2018-08-01 VITALS
SYSTOLIC BLOOD PRESSURE: 110 MMHG | BODY MASS INDEX: 29.7 KG/M2 | OXYGEN SATURATION: 99 % | HEART RATE: 66 BPM | HEIGHT: 62 IN | DIASTOLIC BLOOD PRESSURE: 72 MMHG | WEIGHT: 161.4 LBS

## 2018-08-01 DIAGNOSIS — K58.9 IRRITABLE BOWEL SYNDROME, UNSPECIFIED TYPE: ICD-10-CM

## 2018-08-01 DIAGNOSIS — F41.9 ANXIETY: Primary | ICD-10-CM

## 2018-08-01 DIAGNOSIS — E66.3 OVERWEIGHT: ICD-10-CM

## 2018-08-01 PROBLEM — E66.9 CLASS 1 OBESITY WITHOUT SERIOUS COMORBIDITY WITH BODY MASS INDEX (BMI) OF 31.0 TO 31.9 IN ADULT: Status: RESOLVED | Noted: 2017-04-26 | Resolved: 2018-08-01

## 2018-08-01 PROBLEM — E66.811 CLASS 1 OBESITY WITHOUT SERIOUS COMORBIDITY WITH BODY MASS INDEX (BMI) OF 31.0 TO 31.9 IN ADULT: Status: RESOLVED | Noted: 2017-04-26 | Resolved: 2018-08-01

## 2018-08-01 PROCEDURE — 99214 OFFICE O/P EST MOD 30 MIN: CPT | Performed by: FAMILY MEDICINE

## 2018-08-01 PROCEDURE — 3008F BODY MASS INDEX DOCD: CPT | Performed by: FAMILY MEDICINE

## 2018-08-01 PROCEDURE — 1036F TOBACCO NON-USER: CPT | Performed by: FAMILY MEDICINE

## 2018-08-01 RX ORDER — QUETIAPINE FUMARATE 50 MG/1
50 TABLET, EXTENDED RELEASE ORAL
COMMUNITY

## 2018-08-01 NOTE — ASSESSMENT & PLAN NOTE
She is doing extremely well with weight loss and exercise  We just discussed the fact that she is no longer obese and she is in the overweight category  She has lost over 60 lb since summer 2017  I congratulated her and urged her to keep up the good work

## 2018-08-01 NOTE — PROGRESS NOTES
Assessment/Plan:    Anxiety    Her anxiety has been under excellent control on the current medical regimen  She will continue the medicines and visits with her psychiatrist every 3 or 4 months  IBS (irritable bowel syndrome)    GI symptoms are well controlled through diet  Overweight    She is doing extremely well with weight loss and exercise  We just discussed the fact that she is no longer obese and she is in the overweight category  She has lost over 60 lb since summer 2017  I congratulated her and urged her to keep up the good work  Diagnoses and all orders for this visit:    Anxiety    Irritable bowel syndrome, unspecified type    Overweight    Other orders  -     QUEtiapine (SEROquel XR) 50 mg; Take 50 mg by mouth daily at bedtime          Subjective:      Patient ID: Adriana Sesay is a 39 y o  female  She is here for follow up and she is feeling well  She has lost over 60 lbs since last yr  She has a new psychiatrist down at [facility] and she is very happy with her  She suffers from panic disorder and OC D  Her medication needs have decreased  Currently she takes fluoxetine 20 mg, quetiapine 50 mg, and clonazepam 0 5 mg once a day  She takes all her medications together at bedtime  She has been  Doing lots of walking for exercise usually 6 days per week  IBS is well controlled with diet  She has been having some visual symptoms of the left eye followed by a headache  She thinks this is probably an ocular migraine  Thankfully the headache is not very severe  The following portions of the patient's history were reviewed and updated as appropriate: allergies, current medications, past family history, past medical history, past social history, past surgical history and problem list     Review of Systems   Constitutional: Negative for activity change, chills, fatigue and fever  HENT: Negative for congestion, ear pain, sinus pressure and sore throat      Eyes: Negative for pain and visual disturbance  Respiratory: Negative for cough, chest tightness, shortness of breath and wheezing  Cardiovascular: Negative for chest pain, palpitations and leg swelling  Gastrointestinal: Negative for abdominal pain, blood in stool, constipation, diarrhea, nausea and vomiting  Endocrine: Negative for polydipsia and polyuria  Genitourinary: Negative for difficulty urinating, dysuria, frequency and urgency  Musculoskeletal: Negative for arthralgias, joint swelling and myalgias  Skin: Negative for rash  Neurological: Positive for headaches  Negative for dizziness, weakness and numbness  Hematological: Negative for adenopathy  Does not bruise/bleed easily  Psychiatric/Behavioral: Negative for dysphoric mood  The patient is not nervous/anxious  Objective:      /72 (BP Location: Left arm, Patient Position: Sitting, Cuff Size: Standard)   Pulse 66   Ht 5' 2" (1 575 m)   Wt 73 2 kg (161 lb 6 4 oz)   SpO2 99%   BMI 29 52 kg/m²          Physical Exam   Constitutional: She appears well-developed and well-nourished  HENT:   Head: Normocephalic and atraumatic  Nursing note and vitals reviewed

## 2018-08-01 NOTE — ASSESSMENT & PLAN NOTE
Her anxiety has been under excellent control on the current medical regimen  She will continue the medicines and visits with her psychiatrist every 3 or 4 months

## 2018-10-01 ENCOUNTER — IMMUNIZATION (OUTPATIENT)
Dept: FAMILY MEDICINE CLINIC | Facility: CLINIC | Age: 43
End: 2018-10-01
Payer: COMMERCIAL

## 2018-10-01 DIAGNOSIS — Z23 NEED FOR INFLUENZA VACCINATION: Primary | ICD-10-CM

## 2018-10-01 PROCEDURE — 90682 RIV4 VACC RECOMBINANT DNA IM: CPT

## 2018-10-01 PROCEDURE — 90471 IMMUNIZATION ADMIN: CPT

## 2019-01-10 ENCOUNTER — HOSPITAL ENCOUNTER (OUTPATIENT)
Dept: ULTRASOUND IMAGING | Facility: CLINIC | Age: 44
Discharge: HOME | End: 2019-01-10
Payer: COMMERCIAL

## 2019-01-10 VITALS — HEIGHT: 62 IN | BODY MASS INDEX: 29.52 KG/M2

## 2019-01-10 DIAGNOSIS — R92.2 DENSE BREAST TISSUE: ICD-10-CM

## 2019-01-10 DIAGNOSIS — R92.8 ABNORMAL FINDING ON BREAST IMAGING: ICD-10-CM

## 2019-01-10 DIAGNOSIS — Z12.39 BREAST CANCER SCREENING OTHER THAN MAMMOGRAM: ICD-10-CM

## 2019-01-10 PROCEDURE — 76642 ULTRASOUND BREAST LIMITED: CPT

## 2019-01-10 PROCEDURE — 76377 3D RENDER W/INTRP POSTPROCES: CPT

## 2019-01-10 PROCEDURE — 76641 ULTRASOUND BREAST COMPLETE: CPT

## 2019-03-14 ENCOUNTER — HOSPITAL ENCOUNTER (OUTPATIENT)
Dept: ULTRASOUND IMAGING | Facility: CLINIC | Age: 44
Discharge: HOME | End: 2019-03-14
Payer: COMMERCIAL

## 2019-03-14 VITALS — HEIGHT: 62 IN | BODY MASS INDEX: 28.52 KG/M2 | WEIGHT: 155 LBS

## 2019-03-14 DIAGNOSIS — R92.8 ABNORMAL ULTRASOUND OF BREAST: ICD-10-CM

## 2019-03-14 PROCEDURE — 76642 ULTRASOUND BREAST LIMITED: CPT

## 2019-05-28 ENCOUNTER — OFFICE VISIT (OUTPATIENT)
Dept: BARIATRICS | Facility: CLINIC | Age: 44
End: 2019-05-28
Payer: COMMERCIAL

## 2019-05-28 ENCOUNTER — APPOINTMENT (OUTPATIENT)
Dept: LAB | Facility: MEDICAL CENTER | Age: 44
End: 2019-05-28
Payer: COMMERCIAL

## 2019-05-28 VITALS
RESPIRATION RATE: 18 BRPM | HEART RATE: 92 BPM | SYSTOLIC BLOOD PRESSURE: 100 MMHG | WEIGHT: 159.4 LBS | TEMPERATURE: 99.5 F | HEIGHT: 62 IN | BODY MASS INDEX: 29.33 KG/M2 | DIASTOLIC BLOOD PRESSURE: 64 MMHG

## 2019-05-28 DIAGNOSIS — Z13.220 SCREENING FOR HYPERLIPIDEMIA: ICD-10-CM

## 2019-05-28 DIAGNOSIS — E66.3 OVERWEIGHT: ICD-10-CM

## 2019-05-28 DIAGNOSIS — R63.5 ABNORMAL WEIGHT GAIN: ICD-10-CM

## 2019-05-28 DIAGNOSIS — R63.5 ABNORMAL WEIGHT GAIN: Primary | ICD-10-CM

## 2019-05-28 DIAGNOSIS — E55.9 VITAMIN D DEFICIENCY: ICD-10-CM

## 2019-05-28 DIAGNOSIS — F41.9 ANXIETY: ICD-10-CM

## 2019-05-28 LAB
25(OH)D3 SERPL-MCNC: 19.7 NG/ML (ref 30–100)
ALBUMIN SERPL BCP-MCNC: 4.2 G/DL (ref 3.5–5)
ALP SERPL-CCNC: 59 U/L (ref 46–116)
ALT SERPL W P-5'-P-CCNC: 17 U/L (ref 12–78)
ANION GAP SERPL CALCULATED.3IONS-SCNC: 9 MMOL/L (ref 4–13)
AST SERPL W P-5'-P-CCNC: 7 U/L (ref 5–45)
BILIRUB SERPL-MCNC: 0.47 MG/DL (ref 0.2–1)
BUN SERPL-MCNC: 17 MG/DL (ref 5–25)
CALCIUM SERPL-MCNC: 8.8 MG/DL (ref 8.3–10.1)
CHLORIDE SERPL-SCNC: 108 MMOL/L (ref 100–108)
CHOLEST SERPL-MCNC: 149 MG/DL (ref 50–200)
CO2 SERPL-SCNC: 25 MMOL/L (ref 21–32)
CREAT SERPL-MCNC: 0.73 MG/DL (ref 0.6–1.3)
GFR SERPL CREATININE-BSD FRML MDRD: 100 ML/MIN/1.73SQ M
GLUCOSE P FAST SERPL-MCNC: 92 MG/DL (ref 65–99)
HDLC SERPL-MCNC: 49 MG/DL (ref 40–60)
LDLC SERPL CALC-MCNC: 82 MG/DL (ref 0–100)
NONHDLC SERPL-MCNC: 100 MG/DL
POTASSIUM SERPL-SCNC: 4.5 MMOL/L (ref 3.5–5.3)
PROT SERPL-MCNC: 7.4 G/DL (ref 6.4–8.2)
SODIUM SERPL-SCNC: 142 MMOL/L (ref 136–145)
TRIGL SERPL-MCNC: 88 MG/DL
TSH SERPL DL<=0.05 MIU/L-ACNC: 1.2 UIU/ML (ref 0.36–3.74)

## 2019-05-28 PROCEDURE — 36415 COLL VENOUS BLD VENIPUNCTURE: CPT

## 2019-05-28 PROCEDURE — 84443 ASSAY THYROID STIM HORMONE: CPT

## 2019-05-28 PROCEDURE — 80053 COMPREHEN METABOLIC PANEL: CPT

## 2019-05-28 PROCEDURE — 99214 OFFICE O/P EST MOD 30 MIN: CPT | Performed by: PHYSICIAN ASSISTANT

## 2019-05-28 PROCEDURE — 82306 VITAMIN D 25 HYDROXY: CPT

## 2019-05-28 PROCEDURE — 80061 LIPID PANEL: CPT

## 2019-05-28 RX ORDER — MULTIVIT-MIN/IRON/FOLIC ACID/K 18-600-40
2000 CAPSULE ORAL 2 TIMES DAILY
COMMUNITY

## 2019-05-29 DIAGNOSIS — F41.9 ANXIETY: ICD-10-CM

## 2019-05-29 DIAGNOSIS — E55.9 VITAMIN D DEFICIENCY: Primary | ICD-10-CM

## 2019-05-29 DIAGNOSIS — E66.3 OVERWEIGHT: ICD-10-CM

## 2019-05-29 RX ORDER — ERGOCALCIFEROL 1.25 MG/1
50000 CAPSULE ORAL WEEKLY
Qty: 8 CAPSULE | Refills: 0 | Status: SHIPPED | OUTPATIENT
Start: 2019-05-29 | End: 2019-05-31 | Stop reason: SDUPTHER

## 2019-05-31 RX ORDER — ERGOCALCIFEROL 1.25 MG/1
50000 CAPSULE ORAL WEEKLY
Qty: 8 CAPSULE | Refills: 0 | Status: SHIPPED | OUTPATIENT
Start: 2019-05-31 | End: 2020-01-22

## 2019-07-10 ENCOUNTER — OFFICE VISIT (OUTPATIENT)
Dept: GASTROENTEROLOGY | Facility: MEDICAL CENTER | Age: 44
End: 2019-07-10
Payer: COMMERCIAL

## 2019-07-10 VITALS
WEIGHT: 160 LBS | DIASTOLIC BLOOD PRESSURE: 68 MMHG | HEIGHT: 62 IN | HEART RATE: 83 BPM | BODY MASS INDEX: 29.44 KG/M2 | SYSTOLIC BLOOD PRESSURE: 118 MMHG | TEMPERATURE: 99.4 F

## 2019-07-10 DIAGNOSIS — Z12.11 COLON CANCER SCREENING: Primary | ICD-10-CM

## 2019-07-10 PROCEDURE — 99244 OFF/OP CNSLTJ NEW/EST MOD 40: CPT | Performed by: INTERNAL MEDICINE

## 2019-07-10 RX ORDER — FLUTICASONE PROPIONATE 50 MCG
1 SPRAY, SUSPENSION (ML) NASAL AS NEEDED
COMMUNITY

## 2019-07-10 RX ORDER — CETIRIZINE HYDROCHLORIDE 10 MG/1
10 TABLET ORAL DAILY
COMMUNITY

## 2019-07-10 NOTE — PROGRESS NOTES
[facility] - Outpatient Consultation  Reymundo Sandoval 38 y o  female MRN: [id number]  Encounter: [id number]          ASSESSMENT AND PLAN:      1  Colon cancer screening  Patient has family hx of colon polyps and colon cancer  Recommend to repeat colonoscopy every 5 years  She is due for colon cancer screening  Schedule colonoscopy  She was counseled on the benefits and risks of colonoscopy including but not limited to bleeding, infection and bowel perforation   - Na Sulfate-K Sulfate-Mg Sulf (SUPREP BOWEL PREP KIT) 17 5-3 13-1 6 GM/177ML SOLN; Take 1 Bottle by mouth once for 1 dose  Dispense: 1 Bottle; Refill: 0  - Colonoscopy; Future    ______________________________________________________________________    HPI:      37 yo F presented for evaluation of colon cancer screening  Patient has significant family history of colon cancer:her maternal uncle had colon cancer and her mother had partial colectomy and her brother at the age of 52 has findings of significant colon polyps  Patient reported regular formed bowel movement  Denies blood in the stool  Denies weight loss  Her last colonoscopy was 8 years ago with normal findings  She was told to repeat colonoscopy every 5 years due to her family history  She denies smoking  Denies alcohol abuse  REVIEW OF SYSTEMS:    CONSTITUTIONAL: Denies any feve, chills, rigors, and weight loss  HEENT: No earache or tinnitus  Denies hearing loss or visual disturbances  CARDIOVASCULAR: No chest pain or palpitations  RESPIRATORY: Denies any cough, hemoptysis, shortness of breath or dyspnea on exertion  GASTROINTESTINAL: As noted in the History of Present Illness  GENITOURINARY: No problems with urination  Denies any hematuria or dysuria  NEUROLOGIC: No dizziness or vertigo, denies headaches  MUSCULOSKELETAL: Denies any muscle or joint pain  SKIN: Denies skin rashes or itching     ENDOCRINE: Denies excessive thirst  Denies intolerance to heat or cold  PSYCHOSOCIAL: Denies depression or anxiety  Denies any recent memory loss         Historical Information   Past Medical History:   Diagnosis Date    Acute sinusitis     Anxiety     BRCA positive     Breast cyst     Dense breast tissue     Fibrocystic breast     History of early menarche     AGE 14    IBS (irritable bowel syndrome)     OCD (obsessive compulsive disorder)     Panic attacks     Pilonidal cyst     Seasonal allergies     Sinus problem     Spontaneous      Vitamin D deficiency      Past Surgical History:   Procedure Laterality Date    BREAST CYST EXCISION      BREAST SURGERY      EXCISION OF BREAST SINGLE LESION     DILATION AND CURETTAGE, DIAGNOSTIC / THERAPEUTIC  2004    MOUTH SURGERY      WISDOM TEETH 1996    OTHER SURGICAL HISTORY      PILONIDAL CYST RESECTION    SKIN BIOPSY       Social History   Social History     Substance and Sexual Activity   Alcohol Use No     Social History     Substance and Sexual Activity   Drug Use No     Social History     Tobacco Use   Smoking Status Never Smoker   Smokeless Tobacco Never Used     Family History   Problem Relation Age of Onset    Colon cancer Mother         AGE 62     Diabetes Mother     Gallbladder disease Mother     Hypertension Mother     Irritable bowel syndrome Mother     Kidney disease Mother         RECURRENT    Diabetes Father     Hypertension Father     Melanoma Brother         AGE 37    Breast cancer Maternal Grandmother         AGE 55     Stomach cancer Paternal Grandmother         AGE 55     Ovarian cancer Paternal Grandmother         AGE 55     Irritable bowel syndrome Other     Pancreatic cancer Maternal Uncle         UNSPECIFIED LOACTION OF MALIGNANCY        Meds/Allergies       Current Outpatient Medications:     cetirizine (ZyrTEC) 10 mg tablet    cholecalciferol (VITAMIN D3) 1,000 units tablet    clonazePAM (KlonoPIN) 0 5 mg tablet    ergocalciferol (VITAMIN D2) 50,000 units    FLUoxetine (PROzac) 20 mg capsule    fluticasone (FLONASE) 50 mcg/act nasal spray    QUEtiapine (SEROquel XR) 50 mg    Na Sulfate-K Sulfate-Mg Sulf (SUPREP BOWEL PREP KIT) 17 5-3 13-1 6 GM/177ML SOLN    Allergies   Allergen Reactions    Ibuprofen Hives    Quetiapine Hives     Annotation - [id number]: May have extended release Seraquil    Sulfa Antibiotics Hives and Rash    Sulfamethoxazole-Trimethoprim Hives and Rash           Objective     Blood pressure 118/68, pulse 83, temperature 99 4 °F (37 4 °C), temperature source Tympanic, height 5' 2" (1 575 m), weight 72 6 kg (160 lb)  Body mass index is 29 26 kg/m²  PHYSICAL EXAM:      General Appearance:   Alert, cooperative, no distress   HEENT:   Normocephalic, atraumatic, anicteric      Neck:  Supple, symmetrical, trachea midline   Lungs:   Clear to auscultation bilaterally; no rales, rhonchi or wheezing; respirations unlabored    Heart[de-identified]   Regular rate and rhythm; no murmur, rub, or gallop  Abdomen:   Soft, non-tender, non-distended; normal bowel sounds; no masses, no organomegaly    Genitalia:   Deferred    Rectal:   Deferred    Extremities:  No cyanosis, clubbing or edema    Pulses:  2+ and symmetric    Skin:  No jaundice, rashes, or lesions    Lymph nodes:  No palpable cervical lymphadenopathy        Lab Results:   No visits with results within 1 Day(s) from this visit     Latest known visit with results is:   Appointment on 05/28/2019   Component Date Value    Sodium 05/28/2019 142     Potassium 05/28/2019 4 5     Chloride 05/28/2019 108     CO2 05/28/2019 25     ANION GAP 05/28/2019 9     BUN 05/28/2019 17     Creatinine 05/28/2019 0 73     Glucose, Fasting 05/28/2019 92     Calcium 05/28/2019 8 8     AST 05/28/2019 7     ALT 05/28/2019 17     Alkaline Phosphatase 05/28/2019 59     Total Protein 05/28/2019 7 4     Albumin 05/28/2019 4 2     Total Bilirubin 05/28/2019 0 47     eGFR 05/28/2019 100     TSH 3RD NIKKI 05/28/2019 1 200     Cholesterol 05/28/2019 149     Triglycerides 05/28/2019 88     HDL, Direct 05/28/2019 49     LDL Calculated 05/28/2019 82     Non-HDL-Chol (CHOL-HDL) 05/28/2019 100     Vit D, 25-Hydroxy 05/28/2019 19 7*         Radiology Results:   No results found

## 2019-07-11 NOTE — PATIENT INSTRUCTIONS
The patient is scheduled on 8/13/19 for a colonoscopy at [facility] with Dr Jose Roberto Pascal instructions were gone over with by the MA

## 2019-07-16 ENCOUNTER — TRANSCRIBE ORDERS (OUTPATIENT)
Dept: ADMINISTRATIVE | Facility: HOSPITAL | Age: 44
End: 2019-07-16

## 2019-07-16 ENCOUNTER — HOSPITAL ENCOUNTER (OUTPATIENT)
Dept: ULTRASOUND IMAGING | Facility: CLINIC | Age: 44
Discharge: HOME | End: 2019-07-16
Payer: COMMERCIAL

## 2019-07-16 ENCOUNTER — HOSPITAL ENCOUNTER (OUTPATIENT)
Dept: MAMMOGRAPHY | Facility: CLINIC | Age: 44
Discharge: HOME | End: 2019-07-16
Payer: COMMERCIAL

## 2019-07-16 VITALS — WEIGHT: 160 LBS | BODY MASS INDEX: 29.44 KG/M2 | HEIGHT: 62 IN

## 2019-07-16 DIAGNOSIS — R92.8 ABNORMAL ULTRASOUND OF BREAST: Primary | ICD-10-CM

## 2019-07-16 DIAGNOSIS — Z12.31 SCREENING MAMMOGRAM, ENCOUNTER FOR: ICD-10-CM

## 2019-07-16 DIAGNOSIS — R92.2 BREAST DENSITY: Primary | ICD-10-CM

## 2019-07-16 DIAGNOSIS — R92.8 ABNORMAL FINDING ON BREAST IMAGING: ICD-10-CM

## 2019-07-16 PROCEDURE — 76642 ULTRASOUND BREAST LIMITED: CPT

## 2019-07-16 PROCEDURE — 77063 BREAST TOMOSYNTHESIS BI: CPT

## 2019-07-16 PROCEDURE — 77067 SCR MAMMO BI INCL CAD: CPT

## 2019-07-24 ENCOUNTER — OFFICE VISIT (OUTPATIENT)
Dept: SURGICAL ONCOLOGY | Facility: CLINIC | Age: 44
End: 2019-07-24
Payer: COMMERCIAL

## 2019-07-24 VITALS
SYSTOLIC BLOOD PRESSURE: 120 MMHG | WEIGHT: 160.8 LBS | BODY MASS INDEX: 29.59 KG/M2 | RESPIRATION RATE: 16 BRPM | DIASTOLIC BLOOD PRESSURE: 80 MMHG | HEART RATE: 103 BPM | TEMPERATURE: 99.4 F | HEIGHT: 62 IN

## 2019-07-24 DIAGNOSIS — Z12.39 BREAST CANCER SCREENING OTHER THAN MAMMOGRAM: ICD-10-CM

## 2019-07-24 DIAGNOSIS — R92.2 DENSE BREAST TISSUE: ICD-10-CM

## 2019-07-24 DIAGNOSIS — Z13.71 BRCA NEGATIVE: ICD-10-CM

## 2019-07-24 DIAGNOSIS — Z12.31 SCREENING MAMMOGRAM, ENCOUNTER FOR: ICD-10-CM

## 2019-07-24 DIAGNOSIS — Z80.3 FAMILY HISTORY OF BREAST CANCER IN FEMALE: ICD-10-CM

## 2019-07-24 DIAGNOSIS — R92.8 ABNORMAL FINDING ON BREAST IMAGING: Primary | ICD-10-CM

## 2019-07-24 PROCEDURE — 99213 OFFICE O/P EST LOW 20 MIN: CPT | Performed by: SURGERY

## 2019-07-24 NOTE — PROGRESS NOTES
Surgical Oncology Follow Up       [address] [facility] SURGICAL ONCOLOGY [facility] [address]    Joanie Dwyer  1975  [id number]  [address]  [facility] SURGICAL ONCOLOGY [facility]    Chief Complaint   Patient presents with    Follow-up       Assessment/Plan   Diagnoses and all orders for this visit:    Abnormal finding on breast imaging  -     US breast left limited (diagnostic); Future    Breast cancer screening other than mammogram  -     US breast screening bilateral complete (ABUS); Future    Screening mammogram, encounter for  -     Mammo screening bilateral w 3d & cad; Future    BRCA negative    Dense breast tissue    Family history of breast cancer in female        Advance Care Planning/Advance Directives:  Did not discuss  with the patient  Oncology History:     No history exists  History of Present Illness: follow up   -Interval History: none    Review of Systems:  Review of Systems   Constitutional: Negative  Negative for appetite change and fever  Eyes: Negative  Respiratory: Negative for shortness of breath  Cardiovascular: Negative  Gastrointestinal: Negative  Endocrine: Negative  Genitourinary: Negative  Musculoskeletal: Negative  Negative for arthralgias and myalgias  Skin: Negative  Allergic/Immunologic: Negative  Neurological: Negative  Hematological: Negative  Negative for adenopathy  Does not bruise/bleed easily  Psychiatric/Behavioral: Negative          Patient Active Problem List   Diagnosis    Abnormal finding on breast imaging    Anxiety    Dense breast tissue    IBS (irritable bowel syndrome)    OCD (obsessive compulsive disorder)    Panic attacks    Seasonal allergies    Vitamin D deficiency    Family history of breast cancer in female    Screening mammogram, encounter for    Breast cancer screening other than mammogram    BRCA negative    Overweight     Past Medical History:   Diagnosis Date    Acute sinusitis     Anxiety     BRCA positive     Breast cyst     Dense breast tissue     History of early menarche     AGE 14    IBS (irritable bowel syndrome)     OCD (obsessive compulsive disorder)     Panic attacks     Pilonidal cyst     Seasonal allergies     Sinus problem     Spontaneous      Vitamin D deficiency      Past Surgical History:   Procedure Laterality Date    BREAST CYST EXCISION Right 2001    benign    BREAST SURGERY      EXCISION OF BREAST SINGLE LESION     DILATION AND CURETTAGE, DIAGNOSTIC / THERAPEUTIC      MOUTH SURGERY      WISDOM TEETH 1996    OTHER SURGICAL HISTORY      PILONIDAL CYST RESECTION    SKIN BIOPSY       Family History   Problem Relation Age of Onset    Colon cancer Mother         AGE 62     Diabetes Mother     Gallbladder disease Mother     Hypertension Mother     Irritable bowel syndrome Mother     Kidney disease Mother         RECURRENT    Diabetes Father     Hypertension Father     Melanoma Brother         AGE 37    Breast cancer Maternal Grandmother         AGE 55     Stomach cancer Paternal Grandmother         AGE 55     Ovarian cancer Paternal Grandmother         AGE 55     Breast cancer Paternal Grandmother     Pancreatic cancer Maternal Uncle         UNSPECIFIED LOACTION OF MALIGNANCY     No Known Problems Daughter     No Known Problems Daughter     No Known Problems Paternal Aunt     No Known Problems Paternal Aunt      Social History     Socioeconomic History    Marital status: /Civil Union     Spouse name: Not on file    Number of children: Not on file    Years of education: Not on file    Highest education level: Not on file   Occupational History    Not on file   Social Needs    Financial resource strain: Not on file    Food insecurity:     Worry: Not on file     Inability: Not on file    Transportation needs:     Medical: Not on file     Non-medical: Not on file   Tobacco Use    Smoking status: Never Smoker    Smokeless tobacco: Never Used   Substance and Sexual Activity    Alcohol use: No    Drug use: No    Sexual activity: Not on file   Lifestyle    Physical activity:     Days per week: Not on file     Minutes per session: Not on file    Stress: Not on file   Relationships    Social connections:     Talks on phone: Not on file     Gets together: Not on file     Attends Gnosticism service: Not on file     Active member of club or organization: Not on file     Attends meetings of clubs or organizations: Not on file     Relationship status: Not on file    Intimate partner violence:     Fear of current or ex partner: Not on file     Emotionally abused: Not on file     Physically abused: Not on file     Forced sexual activity: Not on file   Other Topics Concern    Not on file   Social History Narrative    ALWAYS USES SEAT BELT     DAILY CAFFEINE CONSUMPTION        Current Outpatient Medications:     cetirizine (ZyrTEC) 10 mg tablet, Take 10 mg by mouth daily, Disp: , Rfl:     cholecalciferol (VITAMIN D3) 1,000 units tablet, Take 1,000 Units by mouth daily, Disp: , Rfl:     clonazePAM (KlonoPIN) 0 5 mg tablet, 0 5 mg daily at bedtime  , Disp: , Rfl:     ergocalciferol (VITAMIN D2) 50,000 units, Take 1 capsule (50,000 Units total) by mouth once a week, Disp: 8 capsule, Rfl: 0    FLUoxetine (PROzac) 20 mg capsule, 20 mg daily at bedtime  , Disp: , Rfl:     fluticasone (FLONASE) 50 mcg/act nasal spray, 1 spray into each nostril as needed for rhinitis, Disp: , Rfl:     QUEtiapine (SEROquel XR) 50 mg, Take 50 mg by mouth daily at bedtime, Disp: , Rfl:     Na Sulfate-K Sulfate-Mg Sulf (SUPREP BOWEL PREP KIT) 17 5-3 13-1 6 GM/177ML SOLN, Take 1 Bottle by mouth once for 1 dose (Patient not taking: Reported on 7/24/2019), Disp: 1 Bottle, Rfl: 0  Allergies   Allergen Reactions    Ibuprofen Hives    Quetiapine Hives Annotation - [id number]: May have extended release Seraquil    Sulfa Antibiotics Hives and Rash    Sulfamethoxazole-Trimethoprim Hives and Rash       The following portions of the patient's history were reviewed and updated as appropriate: allergies, current medications, past family history, past medical history, past social history, past surgical history and problem list         Vitals:    07/24/19 1405   BP: 120/80   Pulse: 103   Resp: 16   Temp: 99 4 °F (37 4 °C)       Physical Exam   Constitutional: She is oriented to person, place, and time  She appears well-developed and well-nourished  HENT:   Head: Normocephalic and atraumatic  Pulmonary/Chest: Right breast exhibits no inverted nipple, no mass, no nipple discharge, no skin change and no tenderness  Left breast exhibits no inverted nipple, no mass, no nipple discharge, no skin change and no tenderness  Lymphadenopathy:        Right axillary: No pectoral and no lateral adenopathy present  Left axillary: No pectoral and no lateral adenopathy present  Right: No supraclavicular adenopathy present  Left: No supraclavicular adenopathy present  Neurological: She is alert and oriented to person, place, and time  Psychiatric: She has a normal mood and affect  Results:  Labs:      Imaging  01/10/2019 automated breast ultrasound was a BI-RADS 0 for two nodules on the left side, there was a stable nodule at three o'clock  1/10/19 hand-held ultrasound of the left breast showed benign appearing lesions for which a six month follow-up was recommended  07/16/2019 bilateral 3D screening mammogram as well as follow-up left breast ultrasound are again benign BI-RADS two with a density of three with a mammogram in one year and a left breast ultrasound recommended    I reviewed the above imaging data  Discussion/Summary:  43-year-old female with dense breast tissue, fibrocystic changes and family history of breast cancer    She had genetic testing which was negative  There are no concerns on her examination today  Her recent mammogram and left breast ultrasound were benign  She will be due for an automated breast ultrasound in January  I will make these arrangements for her along with the follow-up left-sided breast ultrasound  I will order her mammogram for next year and see her again at that time or sooner should the need arise

## 2019-08-12 ENCOUNTER — ANESTHESIA EVENT (OUTPATIENT)
Dept: GASTROENTEROLOGY | Facility: MEDICAL CENTER | Age: 44
End: 2019-08-12

## 2019-08-13 ENCOUNTER — HOSPITAL ENCOUNTER (OUTPATIENT)
Dept: GASTROENTEROLOGY | Facility: MEDICAL CENTER | Age: 44
Setting detail: OUTPATIENT SURGERY
Discharge: HOME | End: 2019-08-13
Attending: INTERNAL MEDICINE
Payer: COMMERCIAL

## 2019-08-13 ENCOUNTER — ANESTHESIA (OUTPATIENT)
Dept: GASTROENTEROLOGY | Facility: MEDICAL CENTER | Age: 44
End: 2019-08-13

## 2019-08-13 VITALS
RESPIRATION RATE: 16 BRPM | HEIGHT: 62 IN | DIASTOLIC BLOOD PRESSURE: 72 MMHG | WEIGHT: 160.5 LBS | OXYGEN SATURATION: 100 % | BODY MASS INDEX: 29.53 KG/M2 | TEMPERATURE: 99.8 F | HEART RATE: 84 BPM | SYSTOLIC BLOOD PRESSURE: 144 MMHG

## 2019-08-13 DIAGNOSIS — Z12.11 COLON CANCER SCREENING: ICD-10-CM

## 2019-08-13 LAB
EXT PREGNANCY TEST URINE: NEGATIVE
EXT. CONTROL: NORMAL

## 2019-08-13 PROCEDURE — 81025 URINE PREGNANCY TEST: CPT | Performed by: ANESTHESIOLOGY

## 2019-08-13 PROCEDURE — G0121 COLON CA SCRN NOT HI RSK IND: HCPCS | Performed by: INTERNAL MEDICINE

## 2019-08-13 RX ORDER — PROPOFOL 10 MG/ML
INJECTION, EMULSION INTRAVENOUS AS NEEDED
Status: DISCONTINUED | OUTPATIENT
Start: 2019-08-13 | End: 2019-08-13 | Stop reason: SURG

## 2019-08-13 RX ORDER — SODIUM CHLORIDE 9 MG/ML
125 INJECTION, SOLUTION INTRAVENOUS CONTINUOUS
Status: DISCONTINUED | OUTPATIENT
Start: 2019-08-13 | End: 2019-08-17 | Stop reason: HOSPADM

## 2019-08-13 RX ADMIN — PROPOFOL 50 MG: 10 INJECTION, EMULSION INTRAVENOUS at 13:25

## 2019-08-13 RX ADMIN — PROPOFOL 30 MG: 10 INJECTION, EMULSION INTRAVENOUS at 13:29

## 2019-08-13 RX ADMIN — PROPOFOL 20 MG: 10 INJECTION, EMULSION INTRAVENOUS at 13:33

## 2019-08-13 RX ADMIN — PROPOFOL 50 MG: 10 INJECTION, EMULSION INTRAVENOUS at 13:27

## 2019-08-13 RX ADMIN — PROPOFOL 20 MG: 10 INJECTION, EMULSION INTRAVENOUS at 13:35

## 2019-08-13 RX ADMIN — LIDOCAINE HYDROCHLORIDE 40 MG: 20 INJECTION, SOLUTION INTRAVENOUS at 13:25

## 2019-08-13 RX ADMIN — PROPOFOL 20 MG: 10 INJECTION, EMULSION INTRAVENOUS at 13:37

## 2019-08-13 RX ADMIN — PROPOFOL 20 MG: 10 INJECTION, EMULSION INTRAVENOUS at 13:46

## 2019-08-13 RX ADMIN — PROPOFOL 30 MG: 10 INJECTION, EMULSION INTRAVENOUS at 13:31

## 2019-08-13 RX ADMIN — SODIUM CHLORIDE 125 ML/HR: 0.9 INJECTION, SOLUTION INTRAVENOUS at 12:40

## 2019-08-13 RX ADMIN — PROPOFOL 20 MG: 10 INJECTION, EMULSION INTRAVENOUS at 13:40

## 2019-08-13 RX ADMIN — PROPOFOL 20 MG: 10 INJECTION, EMULSION INTRAVENOUS at 13:43

## 2019-08-13 NOTE — DISCHARGE INSTRUCTIONS
Colonoscopy   WHAT YOU NEED TO KNOW:   A colonoscopy is a procedure to examine the inside of your colon (intestine) with a scope  Polyps or tissue growths may have been removed during your colonoscopy  It is normal to feel bloated and to have some abdominal discomfort  You should be passing gas  If you have hemorrhoids or you had polyps removed, you may have a small amount of bleeding  DISCHARGE INSTRUCTIONS:   Seek care immediately if:   · You have a large amount of bright red blood in your bowel movements  · Your abdomen is hard and firm and you have severe pain  · You have sudden trouble breathing  Contact your healthcare provider if:   · You develop a rash or hives  · You have a fever within 24 hours of your procedure  · You have not had a bowel movement for 3 days after your procedure  · You have questions or concerns about your condition or care  Activity:   · Do not lift, strain, or run  for 3 days after your procedure  · Rest after your procedure  You have been given medicine to relax you  Do not  drive or make important decisions until the day after your procedure  Return to your normal activity as directed  · Relieve gas and discomfort from bloating  by lying on your right side with a heating pad on your abdomen  You may need to take short walks to help the gas move out  Eat small meals until bloating is relieved  If you had polyps removed: For 7 days after your procedure:  · Do not  take aspirin  · Do not  go on long car rides  Help prevent constipation:   · Eat a variety of healthy foods  Healthy foods include fruit, vegetables, whole-grain breads, low-fat dairy products, beans, lean meat, and fish  Ask if you need to be on a special diet  Your healthcare provider may recommend that you eat high-fiber foods such as cooked beans  Fiber helps you have regular bowel movements  · Drink liquids as directed    Adults should drink between 9 and 13 eight-ounce cups of liquid every day  Ask what amount is best for you  For most people, good liquids to drink are water, juice, and milk  · Exercise as directed  Talk to your healthcare provider about the best exercise plan for you  Exercise can help prevent constipation, decrease your blood pressure and improve your health  Follow up with your healthcare provider as directed:  Write down your questions so you remember to ask them during your visits  © 2017 [address] [employer] Information is for End User's use only and may not be sold, redistributed or otherwise used for commercial purposes  All illustrations and images included in CareNotes® are the copyrighted property of [employer]  or Johan Higgins  The above information is an  only  It is not intended as medical advice for individual conditions or treatments  Talk to your doctor, nurse or pharmacist before following any medical regimen to see if it is safe and effective for you  Hemorrhoids   WHAT YOU NEED TO KNOW:   What are hemorrhoids? Hemorrhoids are swollen blood vessels inside your rectum (internal hemorrhoids) or on your anus (external hemorrhoids)  Sometimes a hemorrhoid may prolapse  This means it extends out of your anus  What increases my risk for hemorrhoids? · Pregnancy or obesity    · Straining or sitting for a long time during bowel movements    · Liver disease    · Weak muscles around the anus caused by older age, rectal surgery, or anal intercourse    · A lack of physical activity    · Chronic diarrhea or constipation    · A low-fiber diet  What are the signs and symptoms of hemorrhoids?    · Pain or itching around your anus or inside your rectum    · Swelling or bumps around your anus    · Bright red blood in your bowel movement, on the toilet paper, or in the toilet bowl    · Tissue bulging out of your anus (prolapsed hemorrhoids)    · Incontinence (poor control over urine or bowel movements)  How are hemorrhoids diagnosed? Your healthcare provider will ask about your symptoms, the foods you eat, and your bowel movements  He will examine your anus for external hemorrhoids  You may need the following:  · A digital rectal exam  is a test to check for hemorrhoids  Your healthcare provider will put a gloved finger inside your anus to feel for the hemorrhoids  · An anoscopy  is a test that uses a scope (small tube with a light and camera on the end) to look at your hemorrhoids  How are hemorrhoids treated? Treatment will depend on your symptoms  You may need any of the following:  · Medicines  can help decrease pain and swelling, and soften your bowel movement  The medicine may be a pill, pad, cream, or ointment  · Procedures  may be used to shrink or remove your hemorrhoid  Examples include rubber-band ligation, sclerotherapy, and photocoagulation  These procedures may be done in your healthcare provider's office  Ask your healthcare provider for more information about these procedures  · Surgery  may be needed to shrink or remove your hemorrhoids  How can I manage my symptoms? · Apply ice on your anus for 15 to 20 minutes every hour or as directed  Use an ice pack, or put crushed ice in a plastic bag  Cover it with a towel before you apply it to your anus  Ice helps prevent tissue damage and decreases swelling and pain  · Take a sitz bath  Fill a bathtub with 4 to 6 inches of warm water  You may also use a sitz bath pan that fits inside a toilet bowl  Sit in the sitz bath for 15 minutes  Do this 3 times a day, and after each bowel movement  The warm water can help decrease pain and swelling  · Keep your anal area clean  Gently wash the area with warm water daily  Soap may irritate the area  After a bowel movement, wipe with moist towelettes or wet toilet paper  Dry toilet paper can irritate the area  How can I help prevent hemorrhoids? · Do not strain to have a bowel movement    Do not sit on the toilet too long  These actions can increase pressure on the tissues in your rectum and anus  · Drink plenty of liquids  Liquids can help prevent constipation  Ask how much liquid to drink each day and which liquids are best for you  · Eat a variety of high-fiber foods  Examples include fruits, vegetables, and whole grains  Ask your healthcare provider how much fiber you need each day  You may need to take a fiber supplement  · Exercise as directed  Exercise, such as walking, may make it easier to have a bowel movement  Ask your healthcare provider to help you create an exercise plan  · Do not have anal sex  Anal sex can weaken the skin around your rectum and anus  · Avoid heavy lifting  This can cause straining and increase your risk for another hemorrhoid  When should I seek immediate care? · You have severe pain in your rectum or around your anus  · You have severe pain in your abdomen and you are vomiting  · You have bleeding from your anus that soaks through your underwear  When should I contact my healthcare provider? · You have frequent and painful bowel movements  · Your hemorrhoid looks or feels more swollen than usual      · You do not have a bowel movement for 2 days or more  · You see or feel tissue coming through your anus  · You have questions or concerns about your condition or care  CARE AGREEMENT:   You have the right to help plan your care  Learn about your health condition and how it may be treated  Discuss treatment options with your caregivers to decide what care you want to receive  You always have the right to refuse treatment  The above information is an  only  It is not intended as medical advice for individual conditions or treatments  Talk to your doctor, nurse or pharmacist before following any medical regimen to see if it is safe and effective for you    © 2017 [employer] Information is for End User's use only and may not be sold, redistributed or otherwise used for commercial purposes  All illustrations and images included in CareNotes® are the copyrighted property of A [employer] A M , Inc  or Johan Higgins

## 2019-08-13 NOTE — ANESTHESIA PREPROCEDURE EVALUATION
Review of Systems/Medical History  Patient summary reviewed  Chart reviewed  History of anesthetic complications PONV    Cardiovascular  Negative cardio ROS    Pulmonary  Negative pulmonary ROS        GI/Hepatic    Bowel prep       Negative  ROS        Endo/Other  Negative endo/other ROS      GYN       Hematology  Negative hematology ROS      Musculoskeletal  Negative musculoskeletal ROS        Neurology  Negative neurology ROS      Psychology   Psychiatric history (OCD), Anxiety,              Physical Exam    Airway    Mallampati score: II  TM Distance: >3 FB  Neck ROM: full     Dental   No notable dental hx     Cardiovascular  Comment: Negative ROS, Rhythm: regular, Rate: normal, Cardiovascular exam normal    Pulmonary  Pulmonary exam normal     Other Findings        Anesthesia Plan  ASA Score- 2     Anesthesia Type- IV sedation with anesthesia with ASA Monitors  Additional Monitors:   Airway Plan:         Plan Factors-    Induction- intravenous  Postoperative Plan-     Informed Consent- Anesthetic plan and risks discussed with patient

## 2019-08-13 NOTE — H&P
History and Physical - [facility]  Chelsea Singh 38 y o  female MRN: [id number]                  HPI: Chelsea Singh is a 38y o  year old female who presents for family hx of colon cancer       REVIEW OF SYSTEMS: Per the HPI, and otherwise unremarkable      Historical Information   Past Medical History:   Diagnosis Date    Acute sinusitis     Anxiety     Dense breast tissue     History of early menarche     AGE 14    IBS (irritable bowel syndrome)     OCD (obsessive compulsive disorder)     Panic attacks     Pilonidal cyst     PONV (postoperative nausea and vomiting)     Seasonal allergies     Sinus problem     Spontaneous      Vitamin D deficiency      Past Surgical History:   Procedure Laterality Date    BREAST CYST EXCISION Right 2001    benign    BREAST SURGERY      EXCISION OF BREAST SINGLE LESION     DILATION AND CURETTAGE, DIAGNOSTIC / THERAPEUTIC      MOUTH SURGERY      WISDOM TEETH 1996    OTHER SURGICAL HISTORY      PILONIDAL CYST RESECTION    SKIN BIOPSY       Social History   Social History     Substance and Sexual Activity   Alcohol Use No     Social History     Substance and Sexual Activity   Drug Use No     Social History     Tobacco Use   Smoking Status Never Smoker   Smokeless Tobacco Never Used     Family History   Problem Relation Age of Onset    Colon cancer Mother         AGE 62     Diabetes Mother     Gallbladder disease Mother     Hypertension Mother     Irritable bowel syndrome Mother     Kidney disease Mother         RECURRENT    Diabetes Father     Hypertension Father     Melanoma Brother         AGE 37    Breast cancer Maternal Grandmother         AGE 55     Stomach cancer Paternal Grandmother         AGE 55     Ovarian cancer Paternal Grandmother         AGE 55     Breast cancer Paternal Grandmother     Pancreatic cancer Maternal Uncle         UNSPECIFIED LOACTION OF MALIGNANCY     No Known Problems Daughter     No Known Problems Daughter     No Known Problems Paternal Aunt     No Known Problems Paternal Aunt        Meds/Allergies       (Not in a hospital admission)    Allergies   Allergen Reactions    Ibuprofen Hives    Quetiapine Hives     Annotation - [id number]: May have extended release Seraquil    Sulfa Antibiotics Hives and Rash    Sulfamethoxazole-Trimethoprim Hives and Rash       Objective     /69   Pulse 76   Temp 99 8 °F (37 7 °C) (Temporal)   Resp 16   Ht 5' 2" (1 575 m)   Wt 72 8 kg (160 lb 8 oz)   LMP 07/22/2019   SpO2 99%   BMI 29 36 kg/m²       PHYSICAL EXAM    Gen: NAD  CV: RRR  CHEST: Clear  ABD: soft, NT/ND  EXT: no edema      ASSESSMENT/PLAN:  This is a 38y o  year old female here for family hx of colon cancer, and she is stable and optimized for her procedure

## 2019-08-26 ENCOUNTER — ANNUAL EXAM (OUTPATIENT)
Dept: OBGYN CLINIC | Facility: CLINIC | Age: 44
End: 2019-08-26
Payer: COMMERCIAL

## 2019-08-26 VITALS — WEIGHT: 159.2 LBS | SYSTOLIC BLOOD PRESSURE: 100 MMHG | BODY MASS INDEX: 29.12 KG/M2 | DIASTOLIC BLOOD PRESSURE: 70 MMHG

## 2019-08-26 DIAGNOSIS — Z01.419 ENCOUNTER FOR GYNECOLOGICAL EXAMINATION WITHOUT ABNORMAL FINDING: ICD-10-CM

## 2019-08-26 DIAGNOSIS — Z12.39 SCREENING FOR BREAST CANCER: Primary | ICD-10-CM

## 2019-08-26 DIAGNOSIS — Z01.419 PAP SMEAR, AS PART OF ROUTINE GYNECOLOGICAL EXAMINATION: ICD-10-CM

## 2019-08-26 PROCEDURE — S0612 ANNUAL GYNECOLOGICAL EXAMINA: HCPCS | Performed by: OBSTETRICS & GYNECOLOGY

## 2019-08-26 NOTE — PROGRESS NOTES
Assessment/Plan:    No problem-specific Assessment & Plan notes found for this encounter  Diagnoses and all orders for this visit:    Screening for breast cancer  -     Mammo screening bilateral w cad; Future    Encounter for gynecological examination without abnormal finding  -     Thinprep Pap (Refl) HPV mRNA E6/E7    Pap smear, as part of routine gynecological examination  -     Thinprep Pap (Refl) HPV mRNA E6/E7        Subjective:      Patient ID: Prashanth Burger is a 38 y o  female  The pt is a 37 y/o female who presents today for her annual gynecologic wellness examination  The pt has no new allergies  The pt denies any breast changes, skin changes, lumps, tenderness, or nipple discharge  Pt does SBE monthly  The pt denies any heat/cold intolerance, increased sweating, or hot flashes  She has no change in appetite or weight  The pt has no abdominal pain, constipation, diarrhea, changes in bowel or bladder habits, dysuria, hematuria, or blood in the stool/urine  The pt denies any vaginal or vulval changes, tenderness, redness, itching, sores, discharge, or bleeding  The pt's LMP was 1 month ago  Her menses are regular and of average flow  The pt is sexually active  The pt has no bleeding or pain during intercourse or after  The pt has had 3 prior pregnancies, 1 was a miscarriage ([id number])  The pt's grandmother and great aunts on that side all have breast cancer  Her mother has only had lumpectomies  She also has a family history of colon cancer  The pt gets regular paps and mammograms, with no recent abnormalities  Pt has previously had a normal colonoscopy  Pt complained of some numbness and tingling in her hands b/l this summer  She thinks it may be related to sitting at her patio table outside when working from home           The following portions of the patient's history were reviewed and updated as appropriate: allergies, current medications, past family history, past medical history, past social history, past surgical history and problem list     Review of Systems   Constitutional: Negative  Negative for appetite change, fatigue and unexpected weight change  HENT: Negative  Eyes: Negative  Respiratory: Negative  Cardiovascular: Negative  Gastrointestinal: Negative  Negative for abdominal pain, diarrhea, nausea and vomiting  Endocrine: Negative  Genitourinary: Negative  Negative for dysuria, hematuria, vaginal bleeding, vaginal discharge and vaginal pain  Musculoskeletal: Negative  Skin: Negative  Neurological: Positive for numbness  Tingling in hands   Psychiatric/Behavioral: Negative  Objective:      /70   Wt 72 2 kg (159 lb 3 2 oz)   LMP 08/14/2019   BMI 29 12 kg/m²          Physical Exam   Constitutional: She appears well-developed  HENT:   Head: Normocephalic  Eyes: Pupils are equal, round, and reactive to light  Neck: Normal range of motion  Neck supple  Cardiovascular: Normal rate and regular rhythm  Pulmonary/Chest: Effort normal and breath sounds normal  Right breast exhibits no skin change  Left breast exhibits no skin change  No breast swelling, tenderness or discharge  Abdominal: Soft  Normal appearance and bowel sounds are normal    Genitourinary: Rectum normal, vagina normal and uterus normal  Pelvic exam was performed with patient supine  Right adnexum displays no mass, no tenderness and no fullness  Left adnexum displays no mass, no tenderness and no fullness  Lymphadenopathy:     She has no cervical adenopathy  She has no axillary adenopathy  Right: No inguinal and no supraclavicular adenopathy present  Left: No inguinal and no supraclavicular adenopathy present  Neurological: She is alert  Skin: Skin is intact  No rash noted  Psychiatric: She has a normal mood and affect   Her speech is normal and behavior is normal  Judgment and thought content normal  Cognition and memory are normal

## 2019-08-28 ENCOUNTER — OFFICE VISIT (OUTPATIENT)
Dept: FAMILY MEDICINE CLINIC | Facility: CLINIC | Age: 44
End: 2019-08-28
Payer: COMMERCIAL

## 2019-08-28 VITALS
WEIGHT: 158.6 LBS | HEART RATE: 79 BPM | BODY MASS INDEX: 29.01 KG/M2 | TEMPERATURE: 99.3 F | DIASTOLIC BLOOD PRESSURE: 70 MMHG | OXYGEN SATURATION: 99 % | SYSTOLIC BLOOD PRESSURE: 118 MMHG

## 2019-08-28 DIAGNOSIS — R20.0 NUMBNESS: Primary | ICD-10-CM

## 2019-08-28 PROCEDURE — 1036F TOBACCO NON-USER: CPT | Performed by: FAMILY MEDICINE

## 2019-08-28 PROCEDURE — 99213 OFFICE O/P EST LOW 20 MIN: CPT | Performed by: FAMILY MEDICINE

## 2019-08-28 NOTE — PROGRESS NOTES
Assessment/Plan:    She had been suffering from numbness both hands which became quite severe couple days ago  Thankfully symptoms have improved dramatically since she changed her workstation  I would agree that this was likely just due to her posture and using a desk that was too high  Would recommend continuing to monitor  She will let me know if symptoms recur  Diagnoses and all orders for this visit:    Numbness          Subjective:      Patient ID: Elaina Rivas is a 38 y o  female  She is here with complaint of intermittent numbness in her hands which started 2 days ago  She had noticed this when she was driving to an appointment in the car and it was quite severe  After she got to her appointment and she sat up straight with her hands lower, the symptoms resolved  She realized that she probably had the symptoms because she was doing work at a patio table which is higher than her desk  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  She does the work at home  during the summer she has used the patio table as much as possible  Yesterday she got a lower desk and set up on her patio  This seemed to solve the problem of the numbness  She has also noticed a twitch in her neck which has been going on intermittently since June  She attributes this to the change in posture working at the outdoor desk as well  The following portions of the patient's history were reviewed and updated as appropriate: allergies, current medications, past family history, past medical history, past social history, past surgical history and problem list     Review of Systems   Constitutional: Negative for chills and fever  HENT: Negative for congestion  Respiratory: Negative for cough and wheezing  Cardiovascular: Negative for chest pain and palpitations  Gastrointestinal: Negative for abdominal distention, diarrhea and nausea  Musculoskeletal: Positive for neck pain     Neurological: Negative for dizziness and headaches  Psychiatric/Behavioral: Negative for dysphoric mood  The patient is not nervous/anxious  Objective:      /70 (BP Location: Left arm, Patient Position: Sitting, Cuff Size: Standard)   Pulse 79   Temp 99 3 °F (37 4 °C) (Tympanic)   Wt 71 9 kg (158 lb 9 6 oz)   LMP 08/14/2019   SpO2 99%   BMI 29 01 kg/m²          Physical Exam   Constitutional: She is oriented to person, place, and time  She appears well-developed and well-nourished  HENT:   Head: Normocephalic and atraumatic  Neck: Normal range of motion  Neck supple  No thyromegaly present  Cardiovascular: Normal rate, regular rhythm, normal heart sounds and intact distal pulses  Pulmonary/Chest: Effort normal and breath sounds normal    Musculoskeletal: Normal range of motion  Lymphadenopathy:     She has no cervical adenopathy  Neurological: She is alert and oriented to person, place, and time  She displays normal reflexes  No cranial nerve deficit or sensory deficit  She exhibits normal muscle tone  Coordination normal    Negative Phalen's and Tinel sign  Skin: Skin is dry  Psychiatric: She has a normal mood and affect  Her behavior is normal    Nursing note and vitals reviewed

## 2019-08-29 LAB
CLINICAL INFO: NORMAL
CYTO CVX: NORMAL
DATE PREVIOUS BX: NORMAL
LMP START DATE: NORMAL
SL AMB PREV. PAP:: NORMAL
SPECIMEN SOURCE CVX/VAG CYTO: NORMAL

## 2019-09-07 ENCOUNTER — TELEPHONE (OUTPATIENT)
Dept: OTHER | Facility: OTHER | Age: 44
End: 2019-09-07

## 2019-09-07 ENCOUNTER — OFFICE VISIT (OUTPATIENT)
Dept: URGENT CARE | Age: 44
End: 2019-09-07
Payer: COMMERCIAL

## 2019-09-07 VITALS
OXYGEN SATURATION: 97 % | TEMPERATURE: 100 F | RESPIRATION RATE: 18 BRPM | HEART RATE: 99 BPM | DIASTOLIC BLOOD PRESSURE: 85 MMHG | SYSTOLIC BLOOD PRESSURE: 155 MMHG

## 2019-09-07 DIAGNOSIS — M79.642 BILATERAL HAND PAIN: ICD-10-CM

## 2019-09-07 DIAGNOSIS — M79.641 BILATERAL HAND PAIN: ICD-10-CM

## 2019-09-07 DIAGNOSIS — R19.7 DIARRHEA, UNSPECIFIED TYPE: Primary | ICD-10-CM

## 2019-09-07 PROCEDURE — 99213 OFFICE O/P EST LOW 20 MIN: CPT | Performed by: PHYSICIAN ASSISTANT

## 2019-09-07 NOTE — PATIENT INSTRUCTIONS
Take medications as directed  Drink plenty of fluids  Follow up with family doctor this week  Go to ER immediately if new or worsening symptoms occur  Nutrition Tips for Relief of Diarrhea   WHAT YOU NEED TO KNOW:   There are diet changes you can make to help relieve or stop diarrhea  These changes include limiting or avoiding foods and liquids that are high in sugar, fat, fiber, and lactose  Lactose is a sugar found in milk products  Milk products can cause diarrhea in people who are lactose intolerant  You should also drink extra liquids to replace fluids that are lost when you have diarrhea  Diarrhea can lead to dehydration  DISCHARGE INSTRUCTIONS:   Foods to limit or avoid:   · Dairy:      ¨ Whole milk    ¨ Half-and-half, cream, and sour cream    ¨ Regular (whole milk) ice cream    · Grains:      ¨ Whole wheat and whole grain breads, pasta, cereals, and crackers    ¨ Brown and wild rice    ¨ Breads and cereals with seeds or nuts    ¨ Popcorn    · Fruit and vegetables:      ¨ All raw fruits, except bananas and melon    ¨ Dried fruits, including prunes and raisins    ¨ Canned fruit in heavy syrup    ¨ Prune juice and any fruit juice with pulp    ¨ Raw vegetables, except lettuce     ¨ Fried vegetables    ¨ Corn, raw and cooked broccoli, cabbage, cauliflower, and rohini greens    · Protein:      ¨ Fried meat, poultry, and fish    ¨ High-fat luncheon meats, such as bologna    ¨ Fatty meats, such as sausage, otero, and hot dogs    ¨ Beans and nuts    · Liquids:      ¨ Sodas and fruit-flavored drinks    ¨ Drinks that contain caffeine, such as energy drinks, coffee, and tea     ¨ Drinks that contain alcohol or sugar alcohol, such as sorbitol  Foods and liquids you may eat or drink:  Most people can tolerate the foods and liquids listed below  If any of them make your symptoms worse, stop eating or drinking them until you feel better  If you are lactose intolerant, avoid milk products    · Dairy:      ¨ Skim or low-fat milk or evaporated milk    ¨ Soy milk or buttermilk     ¨ Low-fat, part-skim, and aged cheese    ¨ Yogurt, low-fat ice cream, or sherbert    · Grains:  (Choose foods with less than 2 grams of dietary fiber per serving )     ¨ White or refined flour breads, bagels, pasta, and crackers    ¨ Cold or hot cereals made from white or refined flour such as puffed rice, cornflakes, or cream of wheat    ¨ White rice    · Fruit and vegetables:      ¨ Bananas or melon    ¨ Fruit juice without pulp, except prune juice    ¨ Canned fruit in juice or light syrup    ¨ Lettuce and most well-cooked vegetables without seeds or skins     ¨ Strained vegetable juice    · Protein:      ¨ Tender, well-cooked meat, poultry, or fish    ¨ Well-cooked eggs or soy foods (cooked without added fat)    ¨ Smooth nut butters    · Fats:  (Limit fats to less than 8 teaspoons a day)     ¨ Oil, butter, or margarine, or mayonnaise    ¨ Cream cheese or salad dressings    · Liquids:      ¨ For infants, breast milk or formula    ¨ Oral rehydration solution     ¨ Decaffeinated coffee or caffeine-free teas    ¨ Soft drinks without caffeine  Other guidelines to follow:   · Drink liquids as directed  You may need to drink more liquids than usual to prevent dehydration  Ask how much liquid to drink each day and which liquids are best for you  You may need to drink an oral rehydration solution (ORS)  An ORS helps replace fluids and electrolytes that you lose when you have diarrhea  · Eat small meals or snacks every 3 to 4 hours  instead of large meals  Continue eating even if you still have diarrhea  Your diarrhea will continue for a few days but should gradually go away  © 2017 [address] [employer] Information is for End User's use only and may not be sold, redistributed or otherwise used for commercial purposes  All illustrations and images included in CareNotes® are the copyrighted property of [employer]  or Johan [employer]gins    The above information is an  only  It is not intended as medical advice for individual conditions or treatments  Talk to your doctor, nurse or pharmacist before following any medical regimen to see if it is safe and effective for you

## 2019-09-07 NOTE — TELEPHONE ENCOUNTER
Khalida Mclean 1975  Call Id: [id number]  Presenting Problem: "I have had diarrhea and nausea since  yesterday "  Service Type: Triage  Charged Service 1: N/A  Pharmacy Name and  Number:  Nurse Assessment  Nurse: Ayana Manning Date/Time: 9/7/2019 11:21:22 AM  Type of assessment required:  ---General (Adult or Child)  Duration of Current S/S  ---Intermittent diarrhea since 8/26/19  2 days of worsening symptoms  Location/Radiation  ---Gastrointestinal  Temperature (F) and route:  ---100 (tympanic) @ 1100  Symptom Specific Meds (Dose/Time):  ---Tylenol, 500 mg  2 tablets at 1100  Other S/S  ---4 watery to loose stools over past 24 hours  Denies blood or mucus in stool  Nausea without vomiting  Denies abdominal pain  Headache in the back of her head  Intermittent twitching in arms, legs and feet  Pain Scale on scale of 1-10, 10 being the worst:  ---Mild headache  Symptom progression:  ---worse  Intake and Output  ---Decreased appetite  Normal fluid intake  Normal urine output  LMP/ Pregnancy:  ---LMP was 8/14/19  Breastfeeding  ---No  Protocols  Protocol Title Nurse Date/Time  Diarrhea PHILLIP Sims Glynda Clarks 9/7/2019 11:26:15 AM  Question Caller Affirmed  Disp  Time Disposition Final User  9/7/2019 11:32:54 AM See Physician within 24 Hours PHILLIP Sims Glynda Clarks  9/7/2019 11:35:24 AM RN Triaged Yes PHILLIP Sims, [facility] Advice Given Per Protocol  SEE PHYSICIAN WITHIN 24 HOURS: * IF OFFICE WILL BE CLOSED AND NO PCP TRIAGE: You need to be seen within the  next 24 hours  An urgent care center is often a good source of care if your doctor's office is closed  FLUID THERAPY DURING MILDMODERATE  DIARRHEA: * Drink more fluids, at least 8-10 cups daily  One cup equals 8 oz (240 ml)  * WATER: For mild to moderate  diarrhea, water is often the best liquid to drink  You should also eat some salty foods (e g , potato chips, pretzels, saltine crackers)   This  is important to make sure you are getting enough salt, sugars, and fluids to meet your body's needs  * SPORTS DRINKS: You can also  drink a sports drinks (e g , Gatorade, Powerade) to help treat and prevent dehydration  For it to work best, mix it half and half with  water  * Avoid caffeinated beverages (Reason: caffeine is mildly dehydrating)  * Avoid alcohol beverages (beer, wine, hard liquor)  FOOD AND NUTRITION DURING MILD-MODERATE DIARRHEA * Maintaining some food intake during episodes of diarrhea is  important  * Begin with boiled starches / cereals (e g , potatoes, rice, noodles, wheat, oats) with a small amount of salt to taste  * Other  foods that are OK include: bananas, yogurt, crackers, soup  * As the diarrhea starts to get better, you can slowly return to a normal diet  OTC MEDS - Loperamide (Imodium AD): * Do not use for more than 2 days  CONTAGIOUSNESS: * Be certain to wash your hands  after using the restroom  * If your work is cooking, handling, serving or preparing food, then you should not work until the diarrhea has  completely stopped  CALL BACK IF: * Signs of dehydration occur (e g , no urine over 12 hours, very dry mouth, lightheaded, etc ) *  Bloody stools * Constant or severe abdominal pain * You become worse  CARE ADVICE given per Diarrhea (Adult) guideline  Patient  was scheduled at [address] [facility] @ [phone] via Skip the Wait    Caller Understands: Yes  Caller Disagree/Comply: Comply  PreDisposition: Unsure

## 2019-09-07 NOTE — PROGRESS NOTES
[facility]        NAME: Rhys Cross is a 38 y o  female  : 1975    MRN: [id number]  DATE: 2019  TIME: 2:13 PM    Assessment and Plan   Diarrhea, unspecified type [R19 7]  1  Diarrhea, unspecified type     2  Bilateral hand pain       Patient appears clinically well  Eating and drinking normally  Only 2-4 stools per day  No abdominal pain  Not every stool is loose, patient has occasional solid stools  Vital signs stable  Patient appears stable at this time, will have patient follow up with her PCP on Monday  Patient states she understands and agrees to plan  Patient understands indications go to ER  Patient Instructions     Take medications as directed  Drink plenty of fluids  Follow up with family doctor this week  Go to ER immediately if new or worsening symptoms occur  Chief Complaint     Chief Complaint   Patient presents with    Diarrhea         History of Present Illness       Patient has 2 week history of intermittent diarrhea since her colonoscopy  Patient states that she has 2-4 watery stools a day  Worse after eating  Patient denies any blood in stool  Patient denies any abdominal pain  Patient states that she occasionally gets nausea and vomiting  Patient states that last night, she had 4 episodes that hit her suddenly  Patient states she felt a little bit lightheaded when this happened  Patient states currently she feels well  Patient has no symptoms at the moment  Patient has no abdominal pain  Patient has no dizziness  Patient states that she has a mild headache, she has not taken any medications for this  Patient states that she also has pain and occasional twitching in her bilateral hands  Patient states that she has already seen her PCP for this issue  They have tried ergonomics changes at work which have been improving her symptoms, however, she just wanted this to be evaluated again    No new or worsening symptoms in this regard  Review of Systems   Review of Systems   Constitutional: Negative  Negative for chills, diaphoresis, fatigue and fever  HENT: Negative for congestion, postnasal drip, sinus pressure, sore throat and trouble swallowing  Respiratory: Negative for chest tightness, shortness of breath and wheezing  Cardiovascular: Negative for chest pain and palpitations  Gastrointestinal: Positive for diarrhea and vomiting  Negative for abdominal pain, blood in stool and nausea  Genitourinary: Negative for dysuria  Musculoskeletal: Negative for back pain and neck pain  Skin: Negative for pallor and rash  Neurological: Negative for syncope, weakness, light-headedness and headaches           Current Medications       Current Outpatient Medications:     cetirizine (ZyrTEC) 10 mg tablet, Take 10 mg by mouth daily, Disp: , Rfl:     cholecalciferol (VITAMIN D3) 1,000 units tablet, Take 1,000 Units by mouth daily, Disp: , Rfl:     clonazePAM (KlonoPIN) 0 5 mg tablet, 0 5 mg daily at bedtime  , Disp: , Rfl:     ergocalciferol (VITAMIN D2) 50,000 units, Take 1 capsule (50,000 Units total) by mouth once a week (Patient taking differently: Take 50,000 Units by mouth once a week ), Disp: 8 capsule, Rfl: 0    FLUoxetine (PROzac) 20 mg capsule, 20 mg daily at bedtime  , Disp: , Rfl:     fluticasone (FLONASE) 50 mcg/act nasal spray, 1 spray into each nostril as needed for rhinitis, Disp: , Rfl:     Na Sulfate-K Sulfate-Mg Sulf (SUPREP BOWEL PREP KIT) 17 5-3 13-1 6 GM/177ML SOLN, Take 1 Bottle by mouth once for 1 dose (Patient not taking: Reported on 7/24/2019), Disp: 1 Bottle, Rfl: 0    QUEtiapine (SEROquel XR) 50 mg, Take 50 mg by mouth daily at bedtime, Disp: , Rfl:     Current Allergies     Allergies as of 09/07/2019 - Reviewed 09/07/2019   Allergen Reaction Noted    Ibuprofen Hives 06/18/2018    Quetiapine Hives 07/13/2016    Sulfa antibiotics Hives and Rash 12/02/2015    Sulfamethoxazole-trimethoprim Hives and Rash 2017            The following portions of the patient's history were reviewed and updated as appropriate: allergies, current medications, past family history, past medical history, past social history, past surgical history and problem list      Past Medical History:   Diagnosis Date    Acute sinusitis     Anxiety     Dense breast tissue     History of early menarche     AGE 14    IBS (irritable bowel syndrome)     OCD (obsessive compulsive disorder)     Panic attacks     Pilonidal cyst     PONV (postoperative nausea and vomiting)     Seasonal allergies     Sinus problem     Spontaneous      Vitamin D deficiency        Past Surgical History:   Procedure Laterality Date    BREAST CYST EXCISION Right 2001    benign    BREAST SURGERY      EXCISION OF BREAST SINGLE LESION     DILATION AND CURETTAGE, DIAGNOSTIC / THERAPEUTIC      MOUTH SURGERY      WISDOM TEETH 1996    OTHER SURGICAL HISTORY      PILONIDAL CYST RESECTION    SKIN BIOPSY         Family History   Problem Relation Age of Onset    Colon cancer Mother         AGE 62     Diabetes Mother     Gallbladder disease Mother     Hypertension Mother     Irritable bowel syndrome Mother     Kidney disease Mother         RECURRENT    Diabetes Father     Hypertension Father     Melanoma Brother         AGE 37    Breast cancer Maternal Grandmother         AGE 55     Stomach cancer Paternal Grandmother         AGE 55     Ovarian cancer Paternal Grandmother         AGE 55     Breast cancer Paternal Grandmother     Pancreatic cancer Maternal Uncle         UNSPECIFIED LOACTION OF MALIGNANCY     No Known Problems Daughter     No Known Problems Daughter     No Known Problems Paternal Aunt     No Known Problems Paternal Aunt          Medications have been verified          Objective   /85   Pulse 99   Temp 100 °F (37 8 °C)   Resp 18   LMP 2019   SpO2 97%        Physical Exam     Physical Exam   Constitutional: She appears well-developed and well-nourished  No distress  HENT:   Head: Normocephalic and atraumatic  Right Ear: External ear normal    Left Ear: External ear normal    Nose: Nose normal    Mouth/Throat: Oropharynx is clear and moist  No oropharyngeal exudate  Eyes: Pupils are equal, round, and reactive to light  EOM are normal    Cardiovascular: Normal rate, regular rhythm, normal heart sounds and intact distal pulses  Pulmonary/Chest: Effort normal and breath sounds normal  No respiratory distress  She has no wheezes  She has no rales  Abdominal: Soft  Bowel sounds are normal  She exhibits no distension  There is no tenderness  There is no guarding  No CVA tenderness   Musculoskeletal:        Right wrist: Normal  She exhibits normal range of motion, no tenderness, no bony tenderness, no swelling, no effusion, no crepitus, no deformity and no laceration  Left wrist: Normal  She exhibits normal range of motion, no tenderness, no bony tenderness, no swelling, no effusion, no crepitus, no deformity and no laceration  Right hand: Normal  She exhibits normal range of motion, no tenderness, no bony tenderness, normal capillary refill, no deformity, no laceration and no swelling  Normal sensation noted  Normal strength noted  Left hand: She exhibits normal range of motion, no tenderness, no bony tenderness, normal capillary refill, no deformity, no laceration and no swelling  Normal sensation noted  Normal strength noted  Skin: Skin is warm  Capillary refill takes less than 2 seconds  No rash noted  She is not diaphoretic  No pallor  Nursing note and vitals reviewed

## 2019-09-09 ENCOUNTER — TELEPHONE (OUTPATIENT)
Dept: FAMILY MEDICINE CLINIC | Facility: CLINIC | Age: 44
End: 2019-09-09

## 2019-09-09 NOTE — TELEPHONE ENCOUNTER
Pt went to [address] [place] [address] [facility]gent [facility] , Eugene Garcia will follow with Pt, See other telephone call

## 2019-09-09 NOTE — TELEPHONE ENCOUNTER
Seen at [facility] on 9/7 and was advised to call and check if you wanted to order any FU labs? Pt concerned about muscle twitching and tingling and had a headache Sunday

## 2019-09-10 NOTE — TELEPHONE ENCOUNTER
Pt called again stating she is feeling much better overall, states she still having slightly loose stools but states not like diarrhea  Pt would like to know if you still want to do any f/u or no because she is feeling better

## 2020-01-17 ENCOUNTER — HOSPITAL ENCOUNTER (OUTPATIENT)
Dept: ULTRASOUND IMAGING | Facility: CLINIC | Age: 45
Discharge: HOME | End: 2020-01-17
Payer: COMMERCIAL

## 2020-01-17 VITALS — HEIGHT: 62 IN | BODY MASS INDEX: 29.08 KG/M2 | WEIGHT: 158 LBS

## 2020-01-17 DIAGNOSIS — Z12.39 BREAST CANCER SCREENING OTHER THAN MAMMOGRAM: ICD-10-CM

## 2020-01-17 DIAGNOSIS — R92.8 ABNORMAL FINDING ON BREAST IMAGING: ICD-10-CM

## 2020-01-17 PROCEDURE — 76377 3D RENDER W/INTRP POSTPROCES: CPT

## 2020-01-17 PROCEDURE — 76641 ULTRASOUND BREAST COMPLETE: CPT

## 2020-01-17 PROCEDURE — 76642 ULTRASOUND BREAST LIMITED: CPT

## 2020-01-22 ENCOUNTER — OFFICE VISIT (OUTPATIENT)
Dept: FAMILY MEDICINE CLINIC | Facility: CLINIC | Age: 45
End: 2020-01-22
Payer: COMMERCIAL

## 2020-01-22 ENCOUNTER — APPOINTMENT (OUTPATIENT)
Dept: LAB | Facility: CLINIC | Age: 45
End: 2020-01-22
Payer: COMMERCIAL

## 2020-01-22 VITALS
OXYGEN SATURATION: 98 % | HEIGHT: 62 IN | SYSTOLIC BLOOD PRESSURE: 112 MMHG | HEART RATE: 97 BPM | TEMPERATURE: 98.6 F | WEIGHT: 165 LBS | DIASTOLIC BLOOD PRESSURE: 70 MMHG | BODY MASS INDEX: 30.36 KG/M2

## 2020-01-22 DIAGNOSIS — R25.3 MUSCLE TWITCHING: ICD-10-CM

## 2020-01-22 DIAGNOSIS — E66.3 OVERWEIGHT: ICD-10-CM

## 2020-01-22 DIAGNOSIS — Z00.00 ENCOUNTER FOR HEALTH MAINTENANCE EXAMINATION: Primary | ICD-10-CM

## 2020-01-22 DIAGNOSIS — E55.9 VITAMIN D DEFICIENCY: ICD-10-CM

## 2020-01-22 DIAGNOSIS — Z11.4 SCREENING FOR HIV WITHOUT PRESENCE OF RISK FACTORS: ICD-10-CM

## 2020-01-22 DIAGNOSIS — K58.9 IRRITABLE BOWEL SYNDROME, UNSPECIFIED TYPE: ICD-10-CM

## 2020-01-22 DIAGNOSIS — F41.9 ANXIETY: ICD-10-CM

## 2020-01-22 DIAGNOSIS — D22.9 MULTIPLE NEVI: ICD-10-CM

## 2020-01-22 DIAGNOSIS — R92.8 ABNORMAL FINDING ON BREAST IMAGING: ICD-10-CM

## 2020-01-22 DIAGNOSIS — Z00.00 ANNUAL PHYSICAL EXAM: ICD-10-CM

## 2020-01-22 PROBLEM — Z12.31 SCREENING MAMMOGRAM, ENCOUNTER FOR: Status: RESOLVED | Noted: 2018-07-23 | Resolved: 2020-01-22

## 2020-01-22 LAB
25(OH)D3 SERPL-MCNC: 19.8 NG/ML (ref 30–100)
ALBUMIN SERPL BCP-MCNC: 4 G/DL (ref 3.5–5)
ALP SERPL-CCNC: 60 U/L (ref 46–116)
ALT SERPL W P-5'-P-CCNC: 18 U/L (ref 12–78)
ANION GAP SERPL CALCULATED.3IONS-SCNC: -1 MMOL/L (ref 4–13)
AST SERPL W P-5'-P-CCNC: 6 U/L (ref 5–45)
BASOPHILS # BLD AUTO: 0.02 THOUSANDS/ΜL (ref 0–0.1)
BASOPHILS NFR BLD AUTO: 0 % (ref 0–1)
BILIRUB SERPL-MCNC: 0.68 MG/DL (ref 0.2–1)
BUN SERPL-MCNC: 15 MG/DL (ref 5–25)
CALCIUM SERPL-MCNC: 9.5 MG/DL (ref 8.3–10.1)
CHLORIDE SERPL-SCNC: 107 MMOL/L (ref 100–108)
CO2 SERPL-SCNC: 32 MMOL/L (ref 21–32)
CREAT SERPL-MCNC: 0.71 MG/DL (ref 0.6–1.3)
EOSINOPHIL # BLD AUTO: 0.12 THOUSAND/ΜL (ref 0–0.61)
EOSINOPHIL NFR BLD AUTO: 2 % (ref 0–6)
ERYTHROCYTE [DISTWIDTH] IN BLOOD BY AUTOMATED COUNT: 12.9 % (ref 11.6–15.1)
GFR SERPL CREATININE-BSD FRML MDRD: 103 ML/MIN/1.73SQ M
GLUCOSE P FAST SERPL-MCNC: 83 MG/DL (ref 65–99)
HCT VFR BLD AUTO: 39.6 % (ref 34.8–46.1)
HGB BLD-MCNC: 12.9 G/DL (ref 11.5–15.4)
IMM GRANULOCYTES # BLD AUTO: 0.02 THOUSAND/UL (ref 0–0.2)
IMM GRANULOCYTES NFR BLD AUTO: 0 % (ref 0–2)
LYMPHOCYTES # BLD AUTO: 2.12 THOUSANDS/ΜL (ref 0.6–4.47)
LYMPHOCYTES NFR BLD AUTO: 38 % (ref 14–44)
MCH RBC QN AUTO: 29.1 PG (ref 26.8–34.3)
MCHC RBC AUTO-ENTMCNC: 32.6 G/DL (ref 31.4–37.4)
MCV RBC AUTO: 89 FL (ref 82–98)
MONOCYTES # BLD AUTO: 0.34 THOUSAND/ΜL (ref 0.17–1.22)
MONOCYTES NFR BLD AUTO: 6 % (ref 4–12)
NEUTROPHILS # BLD AUTO: 2.98 THOUSANDS/ΜL (ref 1.85–7.62)
NEUTS SEG NFR BLD AUTO: 54 % (ref 43–75)
NRBC BLD AUTO-RTO: 0 /100 WBCS
PLATELET # BLD AUTO: 221 THOUSANDS/UL (ref 149–390)
PMV BLD AUTO: 11 FL (ref 8.9–12.7)
POTASSIUM SERPL-SCNC: 4.3 MMOL/L (ref 3.5–5.3)
PROT SERPL-MCNC: 7.4 G/DL (ref 6.4–8.2)
RBC # BLD AUTO: 4.43 MILLION/UL (ref 3.81–5.12)
SODIUM SERPL-SCNC: 138 MMOL/L (ref 136–145)
TSH SERPL DL<=0.05 MIU/L-ACNC: 1.15 UIU/ML (ref 0.36–3.74)
WBC # BLD AUTO: 5.6 THOUSAND/UL (ref 4.31–10.16)

## 2020-01-22 PROCEDURE — 85025 COMPLETE CBC W/AUTO DIFF WBC: CPT

## 2020-01-22 PROCEDURE — 80053 COMPREHEN METABOLIC PANEL: CPT

## 2020-01-22 PROCEDURE — 87389 HIV-1 AG W/HIV-1&-2 AB AG IA: CPT

## 2020-01-22 PROCEDURE — 82306 VITAMIN D 25 HYDROXY: CPT

## 2020-01-22 PROCEDURE — 36415 COLL VENOUS BLD VENIPUNCTURE: CPT

## 2020-01-22 PROCEDURE — 99396 PREV VISIT EST AGE 40-64: CPT | Performed by: FAMILY MEDICINE

## 2020-01-22 PROCEDURE — 84443 ASSAY THYROID STIM HORMONE: CPT

## 2020-01-22 NOTE — PROGRESS NOTES
ADULT ANNUAL [facility]    NAME: Candia Bence  AGE: 38 y o  SEX: female  : 1975     DATE: 2020     Assessment and Plan:     Problem List Items Addressed This Visit        Digestive    IBS (irritable bowel syndrome)     She controls IBS symptoms with diet  She also has strong family history of colon cancer and goes for colonoscopy every 5 years  Most recently she had it done summer 2019  Other    Abnormal finding on breast imaging      Thankfully the spot has been stable over 2 years  She will continue every 6 months testing because of strong family history  Anxiety     She is stable on Prozac Seroquel and clonazepam  Her daughter Sarkis Leung recently changed therapists which is helping  Her psychiatrist provider at [facility] is changing, and her next visit will be April  She may need me to refill her medications prior to that visit  Vitamin D deficiency       She has history of vitamin-D deficiency  She is supplementing with over-the-counter 1000 U currently  Will check level  Overweight     She is trying to follow healthy diet  BMI counseling done  She will add exercise         Multiple nevi       She goes to Dermatology once a year in the spring  Other Visit Diagnoses     Encounter for health maintenance examination    -  Primary          Immunizations and preventive care screenings were discussed with patient today  Appropriate education was printed on patient's after visit summary  Counseling:  · Exercise: the importance of regular exercise/physical activity was discussed  Recommend exercise 3-5 times per week for at least 30 minutes  BMI Counseling: Body mass index is 30 18 kg/m²  The BMI is above normal  Nutrition recommendations include decreasing portion sizes and encouraging healthy choices of fruits and vegetables   Exercise recommendations include moderate physical activity 150 minutes/week  No pharmacotherapy was ordered  Patient referred to PCP due to patient being overweight  Return for Annual physical      Chief Complaint:     Chief Complaint   Patient presents with    Follow-up     3 month FU      History of Present Illness:     Adult Annual Physical   Patient here for a comprehensive physical exam  The patient reports problems - occasional muscle twitching  Diet and Physical Activity  · Diet/Nutrition: well balanced diet and consuming 3-5 servings of fruits/vegetables daily  · Exercise: no formal exercise  Depression Screening  PHQ-9 Depression Screening    PHQ-9:    Frequency of the following problems over the past two weeks:       Little interest or pleasure in doing things:  0 - not at all  Feeling down, depressed, or hopeless:  0 - not at all  PHQ-2 Score:  0       General Health  · Sleep: sleeps well and gets 4-6 hours of sleep on average  · Hearing: normal - bilateral   · Vision: goes for regular eye exams and wears glasses  · Dental: regular dental visits, brushes teeth twice daily and flosses teeth occasionally  /GYN Health  · Patient is: premenopausal  · Last menstrual period: Jan 1st  · Contraceptive method: none  Review of Systems:     Review of Systems   Constitutional: Negative for activity change, chills, fatigue and fever  HENT: Negative for congestion, ear pain, sinus pressure and sore throat  Eyes: Negative for pain and visual disturbance  Respiratory: Negative for cough, chest tightness, shortness of breath and wheezing  Cardiovascular: Negative for chest pain, palpitations and leg swelling  Gastrointestinal: Negative for abdominal pain, blood in stool, constipation, diarrhea, nausea and vomiting  Endocrine: Negative for polydipsia and polyuria  Genitourinary: Negative for difficulty urinating, dysuria, frequency and urgency  Musculoskeletal: Negative for arthralgias, joint swelling and myalgias  Skin: Negative for rash  Neurological: Negative for dizziness, tremors, weakness, numbness and headaches  Muscle twitching   Hematological: Negative for adenopathy  Does not bruise/bleed easily  Psychiatric/Behavioral: Negative for dysphoric mood  The patient is not nervous/anxious         Past Medical History:     Past Medical History:   Diagnosis Date    Acute sinusitis     Anxiety     BRCA1 negative     BRCA2 negative     Dense breast tissue     History of early menarche     AGE 11    IBS (irritable bowel syndrome)     OCD (obsessive compulsive disorder)     Panic attacks     Pilonidal cyst     PONV (postoperative nausea and vomiting)     Seasonal allergies     Sinus problem     Spontaneous      Vitamin D deficiency       Past Surgical History:     Past Surgical History:   Procedure Laterality Date    BREAST CYST EXCISION Right 2001    benign    BREAST SURGERY      EXCISION OF BREAST SINGLE LESION     DILATION AND CURETTAGE, DIAGNOSTIC / THERAPEUTIC      MOUTH SURGERY      WISDOM TEETH 1996    OTHER SURGICAL HISTORY      PILONIDAL CYST RESECTION    SKIN BIOPSY        Social History:     Social History     Socioeconomic History    Marital status: /Civil Union     Spouse name: None    Number of children: None    Years of education: None    Highest education level: None   Occupational History    None   Social Needs    Financial resource strain: None    Food insecurity:     Worry: None     Inability: None    Transportation needs:     Medical: None     Non-medical: None   Tobacco Use    Smoking status: Never Smoker    Smokeless tobacco: Never Used   Substance and Sexual Activity    Alcohol use: No    Drug use: No    Sexual activity: Yes     Partners: Male   Lifestyle    Physical activity:     Days per week: None     Minutes per session: None    Stress: None   Relationships    Social connections:     Talks on phone: None     Gets together: None Attends Islam service: None     Active member of club or organization: None     Attends meetings of clubs or organizations: None     Relationship status: None    Intimate partner violence:     Fear of current or ex partner: None     Emotionally abused: None     Physically abused: None     Forced sexual activity: None   Other Topics Concern    None   Social History Narrative    ALWAYS USES SEAT BELT     DAILY CAFFEINE CONSUMPTION       Family History:     Family History   Problem Relation Age of Onset    Colon cancer Mother         AGE 62     Diabetes Mother     Gallbladder disease Mother     Hypertension Mother     Irritable bowel syndrome Mother     Kidney disease Mother         RECURRENT    Diabetes Father     Hypertension Father     Melanoma Brother         AGE 37    Breast cancer Maternal Grandmother         AGE 55     Stomach cancer Paternal Grandmother         AGE 55     Ovarian cancer Paternal Grandmother         AGE 55     Breast cancer Paternal Grandmother     Pancreatic cancer Maternal Uncle         UNSPECIFIED LOACTION OF MALIGNANCY     No Known Problems Daughter     No Known Problems Daughter     No Known Problems Paternal Aunt     No Known Problems Paternal Aunt       Current Medications:     Current Outpatient Medications   Medication Sig Dispense Refill    cetirizine (ZyrTEC) 10 mg tablet Take 10 mg by mouth daily      cholecalciferol (VITAMIN D3) 1,000 units tablet Take 1,000 Units by mouth daily      clonazePAM (KlonoPIN) 0 5 mg tablet 0 5 mg daily at bedtime        FLUoxetine (PROzac) 20 mg capsule 20 mg daily at bedtime        fluticasone (FLONASE) 50 mcg/act nasal spray 1 spray into each nostril as needed for rhinitis      QUEtiapine (SEROquel XR) 50 mg Take 50 mg by mouth daily at bedtime       No current facility-administered medications for this visit  Allergies:      Allergies   Allergen Reactions    Ibuprofen Hives    Quetiapine Hives     Annotation - [id number]: May have extended release Seraquil    Sulfa Antibiotics Hives and Rash    Sulfamethoxazole-Trimethoprim Hives and Rash      Physical Exam:     /70 (BP Location: Left arm, Patient Position: Sitting, Cuff Size: Standard)   Pulse 97   Temp 98 6 °F (37 °C) (Tympanic)   Ht 5' 2" (1 575 m)   Wt 74 8 kg (165 lb)   LMP 01/01/2020 (Exact Date)   SpO2 98%   BMI 30 18 kg/m²     Physical Exam   Constitutional: She is oriented to person, place, and time  She appears well-developed and well-nourished  No distress  HENT:   Head: Normocephalic and atraumatic  Right Ear: External ear normal    Left Ear: External ear normal    Nose: Nose normal    Mouth/Throat: Oropharynx is clear and moist    Eyes: Pupils are equal, round, and reactive to light  Conjunctivae and EOM are normal  No scleral icterus  Neck: Normal range of motion  Neck supple  No thyromegaly present  Cardiovascular: Normal rate, regular rhythm, normal heart sounds and intact distal pulses  No murmur heard  Pulmonary/Chest: Effort normal and breath sounds normal  She has no wheezes  She has no rales  Abdominal: Soft  Bowel sounds are normal  She exhibits no mass  There is no tenderness  Musculoskeletal: Normal range of motion  She exhibits no edema, tenderness or deformity  Lymphadenopathy:     She has no cervical adenopathy  Neurological: She is alert and oriented to person, place, and time  She has normal reflexes  No cranial nerve deficit  Skin: Skin is warm and dry  No rash noted  No erythema  Multiple nevi  Ecchymosis left upper arm   Psychiatric: She has a normal mood and affect  Her behavior is normal    Nursing note and vitals reviewed        MD Yves Davis [id number]

## 2020-01-22 NOTE — ASSESSMENT & PLAN NOTE
She notices twitching symptoms occasionally but she thinks this is stress related  Will add some basic labs

## 2020-01-22 NOTE — ASSESSMENT & PLAN NOTE
She is stable on Prozac Seroquel and clonazepam  Her daughter Goran Or recently changed therapists which is helping  Her psychiatrist provider at [facility] is changing, and her next visit will be April  She may need me to refill her medications prior to that visit

## 2020-01-22 NOTE — PATIENT INSTRUCTIONS

## 2020-01-22 NOTE — ASSESSMENT & PLAN NOTE
She controls IBS symptoms with diet  She also has strong family history of colon cancer and goes for colonoscopy every 5 years  Most recently she had it done summer 2019

## 2020-01-22 NOTE — ASSESSMENT & PLAN NOTE
Thankfully the spot has been stable over 2 years  She will continue every 6 months testing because of strong family history

## 2020-01-22 NOTE — ASSESSMENT & PLAN NOTE
She has history of vitamin-D deficiency  She is supplementing with over-the-counter 1000 U currently  Will check level

## 2020-01-23 LAB — HIV 1+2 AB+HIV1 P24 AG SERPL QL IA: NORMAL

## 2020-02-01 ENCOUNTER — AMB VIDEO VISIT (OUTPATIENT)
Dept: OTHER | Facility: HOSPITAL | Age: 45
End: 2020-02-01
Payer: COMMERCIAL

## 2020-02-01 PROCEDURE — 99201 PR OFFICE OUTPATIENT NEW 10 MINUTES: CPT | Performed by: FAMILY MEDICINE

## 2020-02-03 NOTE — CARE ANYWHERE EVISITS
Visit Summary for Hayley Krishnan - Gender: Female - Date of Birth: [id number]  Date: [id number] - Duration: 6 minutes  Patient: Med Zamora  Provider: Murali Mario    Patient Contact Information  [address]  [address]; [place] [address]      Visit Topics  Fever of almost 102; wet cough / chest congestion / muscles ache  [Added By: Self - 2020-02-01]    Conversation Transcripts  [0A][0A] [Notification] Practice Staff, Global Staff, will help you prepare for your visit  She is assisting Gabriela Becker, Family Physician [0A][Practice Staff] Alexia, and thank you for connecting  While you are waiting for the doctor, are there any   questions I can answer for you about our service? Please contact customer service if you have questions about billing, insurance, or technical issues  Visits work best with a stable WiFi connection, so please make sure you are connected before we   begin [0A][Notification] Practice Staff has left the room  [0A][Notification] You are connected with Murali Mario Family Physician [0A][Notification] Hayley Krishnan is located in [place]  [0A][Notification] Hayley Krishnan has shared health   history  Keerthi Aspen  [0A][Notification] Murali Mario has added a diagnosis/procedure code (see the "Visit Notes" tab)  [0A][Notification] Murali Mario has added a diagnosis/procedure code (see the "Visit Notes" tab)  [0A][Notification] Murali Mario has added a   prescription (see the "Visit Notes" tab)  [0A]    Diagnosis  Flu due to unidentified influenza virus w oth resp manifest    Procedures  Value: [id number] Code: CPT-4 ONLINE E/M BY PHYS/QHP    Medications Prescribed    oseltamivir  Dose : 1 capsule  Route : oral  Frequency : 2 times a day  Refills : 0  Instructions to the Pharmacist : Substitutions allowed      Provider Notes  [0A][0A] We strongly encourage you to share the following record of today's visit with your primary care physician   [0A]Contact phone number: see intake[0A]Mode of Communication: video visit[0A]HPI: Over the past 1-2 days patient has developed acute   onset of fever to 102, head congestion and runny nose, body aches and a mild cough  Patient denies shortness of breath, difficulty breathing, difficulty walking or thinking, stiff neck  [0A]Patient denies recent international travel and notes no known   contacts with persons under investigation for contagious global threats  [0A]Patient did have a flu vaccine this season  [0A]PMH: anxiety, OCD, no asthma, chronic respiratory conditions or chronic heart conditions  Not currently on immunosuppressants, no   prior history of diabetes, HIV or known immune deficiency [0A]PSH: pilonidal cyst removal, breast cyst excision[0A]Smoking HX: denied[0A]Meds: quietiapine, clonazepam, fluoxetine[0A]Allergies: sulfa[0A]PE: [0A]Gen: Slightly ill appearing, in no acute   distress  Speaking in full sentences, mental status appears normal  Hearing appears intact[0A]Eyes: no periorbital edema, discharge, EOMI[0A]Face: no facial swelling, no significant facial flushing, mild bilateral maxillary sinus pressure noted[0A]Neck:   supple with FROM; mild bilateral cervical lymphadenopathy is noted upon patient self-palpation  [0A]Nose: Congested but decent air flow[0A]Resp: Normal respiratory effort, No respiratory distress, tachypnea or wheeze on forced exhalation [0A]Assessment:    flu-like illness, presumed Influenza[0A]Diagnosis Code: J11 1[0A]Plan:  [0A]1  Discussed options  [0A] [0A]2  Recommend supportive treatment- Fluids, rest and Tylenol to treat fever, headache, and myalgia  [0A]3  The use of salicylates such as aspirin,   Pepto Bismol, Basilia Fowler, Kaopectate and Maalox should be avoided, particularly in children below 25years of age, because of the association with Reyes syndrome  [0A]4  Cough suppressants are generally not recommended, especially for children as the   cough is self-limited in most cases and does not require specific therapy  [0A]5   I have called in an Rx for Oseltamivir (TamifluÂ®) to reduce the severity and duration of your symptoms  It is an antiviral and not just a symptom reliever  [0A]6  Please   stay home for at least 24 hours after your fever is gone except to get medical care or for other necessities  (Your fever should be gone without the use of a fever-reducing medicine  )[0A]7  Practice proper cough and sneeze etiquette to prevent the spread   of germs  [0A]8  Wash your hands regularly  Avoid touching your eyes, nose or mouth  Clean and disinfect frequently touched surfaces at home, work or school, especially when someone is ill [0A]9  Please have any close household contacts avoid sharing   drinking glasses and utensils and avoid sharing very close proximity (less than 2 feet) with you for the next 24-48 hours  Consider having close household contacts vaccinated for the flu or see their provider for discussion of other preventative   treatments (especially those household contacts with significant lung heart or immune concerns)  [0A]Follow up:[0A]Please reconnect for another online visit or see your PCP or urgent care provider if symptoms worsen at any point,  if still with fever   after 2-3 more days or if symptoms persist longer than another week  [0A]If you received a prescription at this visit and have any questions or problems with the prescription, call [phone] for assistance  [0A]Please print a copy of this note and   send it to your regular doctor, or take it to your next visit so it may be included in your medical record  [0A]Patient voiced understanding and agrees to plan  [0A]Please see your PCP on an annual basis for prevention services, sooner for follow up of   chronic medical conditions       [0A] [0A]    Electronically signed byGabriela Barger(NPI [id number])

## 2020-07-17 ENCOUNTER — HOSPITAL ENCOUNTER (OUTPATIENT)
Dept: MAMMOGRAPHY | Facility: CLINIC | Age: 45
Discharge: HOME | End: 2020-07-17
Payer: COMMERCIAL

## 2020-07-17 VITALS — TEMPERATURE: 97.7 F | BODY MASS INDEX: 30.36 KG/M2 | WEIGHT: 165 LBS | HEIGHT: 62 IN

## 2020-07-17 DIAGNOSIS — Z12.31 SCREENING MAMMOGRAM, ENCOUNTER FOR: ICD-10-CM

## 2020-07-17 PROCEDURE — 77063 BREAST TOMOSYNTHESIS BI: CPT

## 2020-07-17 PROCEDURE — 77067 SCR MAMMO BI INCL CAD: CPT

## 2020-07-30 ENCOUNTER — OFFICE VISIT (OUTPATIENT)
Dept: FAMILY MEDICINE CLINIC | Facility: CLINIC | Age: 45
End: 2020-07-30
Payer: COMMERCIAL

## 2020-07-30 VITALS
HEIGHT: 62 IN | OXYGEN SATURATION: 98 % | RESPIRATION RATE: 18 BRPM | TEMPERATURE: 98.4 F | HEART RATE: 90 BPM | WEIGHT: 163 LBS | SYSTOLIC BLOOD PRESSURE: 116 MMHG | DIASTOLIC BLOOD PRESSURE: 74 MMHG | BODY MASS INDEX: 30 KG/M2

## 2020-07-30 DIAGNOSIS — Z23 NEED FOR TDAP VACCINATION: ICD-10-CM

## 2020-07-30 DIAGNOSIS — F41.9 ANXIETY: ICD-10-CM

## 2020-07-30 DIAGNOSIS — Z00.00 ANNUAL PHYSICAL EXAM: Primary | ICD-10-CM

## 2020-07-30 PROBLEM — R25.3 MUSCLE TWITCHING: Status: RESOLVED | Noted: 2020-01-22 | Resolved: 2020-07-30

## 2020-07-30 PROCEDURE — 99396 PREV VISIT EST AGE 40-64: CPT | Performed by: FAMILY MEDICINE

## 2020-07-30 PROCEDURE — 90715 TDAP VACCINE 7 YRS/> IM: CPT

## 2020-07-30 PROCEDURE — 90471 IMMUNIZATION ADMIN: CPT

## 2020-07-30 NOTE — ASSESSMENT & PLAN NOTE
Her anxiety symptoms have been well controlled on current regimen  She will continue follow-up with her doctor in [place]  Fortunately she has been doing online visits and the plan to continue this

## 2020-07-30 NOTE — PROGRESS NOTES
ADULT ANNUAL PHYSICAL  [facility]IMARY [facility]    NAME: Shannan Pryor  AGE: 54 y o  SEX: female  : 1975     DATE: 2020     Assessment and Plan:     Problem List Items Addressed This Visit        Other    Anxiety     Her anxiety symptoms have been well controlled on current regimen  She will continue follow-up with her doctor in [address] [place] [address]  Fortunately she has been doing online visits and the plan to continue this  Other Visit Diagnoses     Annual physical exam    -  Primary    Need for Tdap vaccination        Relevant Orders    TDAP VACCINE GREATER THAN OR EQUAL TO 8YO IM (Completed)          Immunizations and preventive care screenings were discussed with patient today  Appropriate education was printed on patient's after visit summary  Counseling:  ·          Return in about 1 year (around 2021)  Chief Complaint:     Chief Complaint   Patient presents with    Annual Exam      History of Present Illness:     Adult Annual Physical   Patient here for a comprehensive physical exam  The patient reports no problems  Diet and Physical Activity  · Diet/Nutrition: well balanced diet and consuming 3-5 servings of fruits/vegetables daily  · Exercise: walking and 3-4 times a week on average  Depression Screening  PHQ-9 Depression Screening    PHQ-9:    Frequency of the following problems over the past two weeks:       Little interest or pleasure in doing things:  0 - not at all  Feeling down, depressed, or hopeless:  0 - not at all  PHQ-2 Score:  0       General Health  · Sleep: sleeps well and gets 7-8 hours of sleep on average  · Hearing: normal - bilateral   · Vision: most recent eye exam <1 year ago and wears glasses  · Dental: regular dental visits, brushes teeth twice daily and flosses teeth occasionally         /GYN Health  · Patient is: premenopausal  · Last menstrual period: 2020  · Contraceptive method: none  Review of Systems:     Review of Systems   Constitutional: Negative for activity change, chills, fatigue and fever  HENT: Negative for congestion, ear pain, sinus pressure and sore throat  Eyes: Negative for pain and visual disturbance  Respiratory: Negative for cough, chest tightness, shortness of breath and wheezing  Cardiovascular: Negative for chest pain, palpitations and leg swelling  Gastrointestinal: Negative for abdominal pain, blood in stool, constipation, diarrhea, nausea and vomiting  Endocrine: Negative for polydipsia and polyuria  Genitourinary: Negative for difficulty urinating, dysuria, frequency and urgency  Musculoskeletal: Negative for arthralgias, joint swelling and myalgias  Skin: Negative for rash  Neurological: Negative for dizziness, weakness, numbness and headaches  Hematological: Negative for adenopathy  Does not bruise/bleed easily  Psychiatric/Behavioral: Negative for dysphoric mood, sleep disturbance and suicidal ideas  The patient is not nervous/anxious         Past Medical History:     Past Medical History:   Diagnosis Date    Acute sinusitis     Anxiety     BRCA1 negative     BRCA2 negative     Dense breast tissue     History of early menarche     AGE 11    IBS (irritable bowel syndrome)     OCD (obsessive compulsive disorder)     Panic attacks     Pilonidal cyst     PONV (postoperative nausea and vomiting)     Seasonal allergies     Sinus problem     Spontaneous      Vitamin D deficiency       Past Surgical History:     Past Surgical History:   Procedure Laterality Date    BREAST CYST EXCISION Right 2001    benign    BREAST SURGERY      EXCISION OF BREAST SINGLE LESION     DILATION AND CURETTAGE, DIAGNOSTIC / THERAPEUTIC  2004    MOUTH SURGERY      WISDOM TEETH 1996    OTHER SURGICAL HISTORY      PILONIDAL CYST RESECTION    SKIN BIOPSY        Social History:        Social History     Socioeconomic History  Marital status: /Civil Union     Spouse name: Not on file    Number of children: Not on file    Years of education: Not on file    Highest education level: Not on file   Occupational History    Not on file   Social Needs    Financial resource strain: Not on file    Food insecurity:     Worry: Not on file     Inability: Not on file    Transportation needs:     Medical: Not on file     Non-medical: Not on file   Tobacco Use    Smoking status: Never Smoker    Smokeless tobacco: Never Used   Substance and Sexual Activity    Alcohol use: No    Drug use: No    Sexual activity: Yes     Partners: Male   Lifestyle    Physical activity:     Days per week: Not on file     Minutes per session: Not on file    Stress: Not on file   Relationships    Social connections:     Talks on phone: Not on file     Gets together: Not on file     Attends Rastafari service: Not on file     Active member of club or organization: Not on file     Attends meetings of clubs or organizations: Not on file     Relationship status: Not on file    Intimate partner violence:     Fear of current or ex partner: Not on file     Emotionally abused: Not on file     Physically abused: Not on file     Forced sexual activity: Not on file   Other Topics Concern    Not on file   Social History Narrative    ALWAYS USES SEAT BELT     DAILY CAFFEINE CONSUMPTION       Family History:     Family History   Problem Relation Age of Onset    Colon cancer Mother         AGE 62     Diabetes Mother     Gallbladder disease Mother     Hypertension Mother     Irritable bowel syndrome Mother     Kidney disease Mother         RECURRENT    Diabetes Father     Hypertension Father     Melanoma Brother         AGE 37    Breast cancer Maternal Grandmother         AGE 55     Stomach cancer Paternal Grandmother         AGE 55     Ovarian cancer Paternal Grandmother         AGE 55     Breast cancer Paternal Grandmother     Pancreatic cancer Maternal Uncle         UNSPECIFIED LOACTION OF MALIGNANCY     No Known Problems Daughter     No Known Problems Maternal Grandfather     No Known Problems Paternal Grandfather     No Known Problems Daughter     No Known Problems Maternal Aunt     No Known Problems Paternal Aunt     No Known Problems Paternal Aunt     No Known Problems Maternal Uncle     No Known Problems Paternal Uncle     No Known Problems Paternal Uncle     No Known Problems Paternal Uncle       Current Medications:     Current Outpatient Medications   Medication Sig Dispense Refill    cetirizine (ZyrTEC) 10 mg tablet Take 10 mg by mouth daily      Cholecalciferol (VITAMIN D) 50 MCG (2000 UT) CAPS Take 2,000 Units by mouth 2 (two) times a day       clonazePAM (KlonoPIN) 0 5 mg tablet 0 5 mg daily at bedtime        FLUoxetine (PROzac) 20 mg capsule 20 mg daily at bedtime        fluticasone (FLONASE) 50 mcg/act nasal spray 1 spray into each nostril as needed for rhinitis      QUEtiapine (SEROquel XR) 50 mg Take 50 mg by mouth daily at bedtime       No current facility-administered medications for this visit  Allergies: Allergies   Allergen Reactions    Ibuprofen Hives    Quetiapine Hives     [facility] [id number]: May have extended release Seraquil    Sulfa Antibiotics Hives and Rash    Sulfamethoxazole-Trimethoprim Hives and Rash      Physical Exam:     /74   Pulse 90   Temp 98 4 °F (36 9 °C)   Resp 18   Ht 5' 2" (1 575 m)   Wt 73 9 kg (163 lb)   LMP 07/01/2020 (Exact Date)   SpO2 98%   BMI 29 81 kg/m²     Physical Exam   Constitutional: She is oriented to person, place, and time  She appears well-developed and well-nourished  No distress  HENT:   Head: Normocephalic and atraumatic  Right Ear: External ear normal    Left Ear: External ear normal    Nose: Nose normal    Mouth/Throat: Oropharynx is clear and moist    Eyes: Pupils are equal, round, and reactive to light   Conjunctivae and EOM are normal  No scleral icterus  Neck: Normal range of motion  Neck supple  No thyromegaly present  Cardiovascular: Normal rate, regular rhythm, normal heart sounds and intact distal pulses  No murmur heard  Pulmonary/Chest: Effort normal and breath sounds normal  She has no wheezes  She has no rales  Abdominal: Soft  Bowel sounds are normal  She exhibits no mass  There is no tenderness  Musculoskeletal: Normal range of motion  She exhibits no edema, tenderness or deformity  Lymphadenopathy:     She has no cervical adenopathy  Neurological: She is alert and oriented to person, place, and time  She has normal reflexes  No cranial nerve deficit  Skin: Skin is warm and dry  Capillary refill takes less than 2 seconds  No rash noted  No erythema  Psychiatric: She has a normal mood and affect  Her behavior is normal    Nursing note and vitals reviewed        MD Yves Rodriguez [id number]

## 2020-08-26 ENCOUNTER — ANNUAL EXAM (OUTPATIENT)
Dept: OBGYN CLINIC | Facility: CLINIC | Age: 45
End: 2020-08-26
Payer: COMMERCIAL

## 2020-08-26 VITALS
SYSTOLIC BLOOD PRESSURE: 122 MMHG | TEMPERATURE: 97.6 F | DIASTOLIC BLOOD PRESSURE: 78 MMHG | BODY MASS INDEX: 30.55 KG/M2 | WEIGHT: 166 LBS | HEIGHT: 62 IN

## 2020-08-26 DIAGNOSIS — Z01.419 PAP SMEAR, AS PART OF ROUTINE GYNECOLOGICAL EXAMINATION: ICD-10-CM

## 2020-08-26 DIAGNOSIS — Z01.419 ENCNTR FOR GYN EXAM (GENERAL) (ROUTINE) W/O ABN FINDINGS: Primary | ICD-10-CM

## 2020-08-26 DIAGNOSIS — Z12.39 BREAST CANCER SCREENING: ICD-10-CM

## 2020-08-26 PROCEDURE — 3008F BODY MASS INDEX DOCD: CPT | Performed by: OBSTETRICS & GYNECOLOGY

## 2020-08-26 PROCEDURE — G0145 SCR C/V CYTO,THINLAYER,RESCR: HCPCS | Performed by: OBSTETRICS & GYNECOLOGY

## 2020-08-26 PROCEDURE — 3008F BODY MASS INDEX DOCD: CPT | Performed by: FAMILY MEDICINE

## 2020-08-26 PROCEDURE — S0612 ANNUAL GYNECOLOGICAL EXAMINA: HCPCS | Performed by: OBSTETRICS & GYNECOLOGY

## 2020-08-26 PROCEDURE — 87624 HPV HI-RISK TYP POOLED RSLT: CPT | Performed by: OBSTETRICS & GYNECOLOGY

## 2020-08-26 NOTE — PROGRESS NOTES
Assessment/Plan:    No problem-specific Assessment & Plan notes found for this encounter  Diagnoses and all orders for this visit:    Encntr for gyn exam (general) (routine) w/o abn findings  -     Liquid-based pap, screening    Pap smear, as part of routine gynecological examination    Breast cancer screening  -     Mammo screening bilateral w 3d & cad; Future          Normal gynecological physical examination  Self-breast examination stressed  Mammogram ordered  Discussed regular exercise, healthy diet, importance of vitamin D and calcium supplements  Discussed importance of sun block use during periods of prolonged sun exposure  Patient will be seen in 1 year for routine gynecologic and medical examination  Patient will call office for any problems, concerns, or issues which may arise during the interim  Subjective:      Patient ID: Janella Crigler is a 54 y o  female  Patient is a 79-year-old female presenting to the office for her annual GYN exam     Patient states she is doing well and has no complaints at this time  Patient states her menses are regular occurring every month and lasting about 2-3 days in duration  Patient states her menses fluctuates and slow, some are heavier than others but they are usually light in flow  Patient denies any pelvic cramping or headaches with menses  Patient denies any vasomotor symptoms  Patient denies any urinary symptoms of urgency, frequency, dysuria, or hematuria  Patient denies any abdominal pain, abdominal bloating, nausea, vomiting, diarrhea, constipation, blood in her stool  Patient has a family history of colon cancer and has had colonoscopies done previously  Patient is up-to-date on colonoscopies and all have been benign  Patient states she does a regular self-breast examine denies any changes in her breasts, breast tenderness, changes in her skin, or nipple discharge    Patient has a family history of breast cancer and sees   Saadia Chou every 6 months for breast examination and ultrasound of the breast   Patient's most recent mammogram in July was negative for any malignancy or masses  Patient denies any fevers, chills, unexpected weight changes, chest pain, or shortness of breath  The following portions of the patient's history were reviewed and updated as appropriate: allergies, current medications, past family history, past medical history, past social history, past surgical history and problem list     Review of Systems   Constitutional: Negative for chills, fever and unexpected weight change  HENT: Negative  Eyes: Negative  Respiratory: Negative for cough and shortness of breath  Cardiovascular: Negative for chest pain and palpitations  Gastrointestinal: Negative for abdominal distention, abdominal pain, blood in stool, constipation, diarrhea, nausea and vomiting  Endocrine: Negative  Genitourinary: Negative for dysuria, frequency, hematuria, pelvic pain, urgency, vaginal discharge and vaginal pain  Musculoskeletal: Negative  Skin: Negative  Neurological: Negative for light-headedness and headaches  Objective:      /78   Temp 97 6 °F (36 4 °C)   Ht 5' 2" (1 575 m)   Wt 75 3 kg (166 lb)   LMP 07/26/2020 (Exact Date)   BMI 30 36 kg/m²          Physical Exam  Constitutional:       General: She is not in acute distress  Appearance: Normal appearance  She is well-developed  She is not diaphoretic  HENT:      Head: Normocephalic and atraumatic  Eyes:      Pupils: Pupils are equal, round, and reactive to light  Neck:      Musculoskeletal: Normal range of motion and neck supple  Comments: No thyromegaly appreciated  Cardiovascular:      Rate and Rhythm: Normal rate and regular rhythm  Heart sounds: Normal heart sounds  No murmur  No friction rub  No gallop  Pulmonary:      Effort: Pulmonary effort is normal       Breath sounds: Normal breath sounds     Chest: Breasts: Breasts are symmetrical          Right: No inverted nipple, mass, nipple discharge, skin change or tenderness  Left: No inverted nipple, mass, nipple discharge, skin change or tenderness  Abdominal:      General: Bowel sounds are normal       Palpations: Abdomen is soft  Genitourinary:     Exam position: Supine  Labia:         Right: No rash or lesion  Left: No rash or lesion  Vagina: Normal  No vaginal discharge, erythema, tenderness or bleeding  Cervix: No discharge or friability  Uterus: Not enlarged and not tender  Adnexa:         Right: No mass, tenderness or fullness  Left: No mass, tenderness or fullness  Rectum: Normal  Guaiac result negative  Comments: Vaginal walls moist and pink  Good pelvic support confirmed  Musculoskeletal: Normal range of motion  Lymphadenopathy:      Cervical: No cervical adenopathy  Upper Body:      Right upper body: No supraclavicular adenopathy  Left upper body: No supraclavicular adenopathy  Skin:     General: Skin is warm and dry  Findings: No rash  Neurological:      Mental Status: She is alert and oriented to person, place, and time  Psychiatric:         Speech: Speech normal          Behavior: Behavior normal          Thought Content:  Thought content normal          Judgment: Judgment normal

## 2020-08-27 LAB
HPV HR 12 DNA CVX QL NAA+PROBE: NEGATIVE
HPV16 DNA CVX QL NAA+PROBE: NEGATIVE
HPV18 DNA CVX QL NAA+PROBE: NEGATIVE

## 2020-09-03 LAB
LAB AP GYN PRIMARY INTERPRETATION: NORMAL
Lab: NORMAL

## 2020-11-17 ENCOUNTER — OFFICE VISIT (OUTPATIENT)
Dept: SURGICAL ONCOLOGY | Facility: CLINIC | Age: 45
End: 2020-11-17
Payer: COMMERCIAL

## 2020-11-17 VITALS
HEIGHT: 62 IN | SYSTOLIC BLOOD PRESSURE: 130 MMHG | DIASTOLIC BLOOD PRESSURE: 84 MMHG | BODY MASS INDEX: 31.1 KG/M2 | TEMPERATURE: 97.9 F | HEART RATE: 68 BPM | WEIGHT: 169 LBS

## 2020-11-17 DIAGNOSIS — R92.8 ABNORMAL FINDING ON BREAST IMAGING: ICD-10-CM

## 2020-11-17 DIAGNOSIS — Z12.39 BREAST CANCER SCREENING OTHER THAN MAMMOGRAM: ICD-10-CM

## 2020-11-17 DIAGNOSIS — Z12.31 SCREENING MAMMOGRAM FOR HIGH-RISK PATIENT: ICD-10-CM

## 2020-11-17 DIAGNOSIS — Z80.3 FAMILY HISTORY OF BREAST CANCER IN FEMALE: ICD-10-CM

## 2020-11-17 DIAGNOSIS — Z13.71 BRCA NEGATIVE: ICD-10-CM

## 2020-11-17 DIAGNOSIS — R92.2 DENSE BREAST TISSUE: Primary | ICD-10-CM

## 2020-11-17 PROCEDURE — 99213 OFFICE O/P EST LOW 20 MIN: CPT | Performed by: SURGERY

## 2020-11-17 PROCEDURE — 1036F TOBACCO NON-USER: CPT | Performed by: SURGERY

## 2020-11-17 PROCEDURE — 3008F BODY MASS INDEX DOCD: CPT | Performed by: SURGERY

## 2021-01-21 ENCOUNTER — HOSPITAL ENCOUNTER (OUTPATIENT)
Dept: ULTRASOUND IMAGING | Facility: CLINIC | Age: 46
Discharge: HOME | End: 2021-01-21
Payer: COMMERCIAL

## 2021-01-21 VITALS — WEIGHT: 169 LBS | BODY MASS INDEX: 31.1 KG/M2 | HEIGHT: 62 IN

## 2021-01-21 DIAGNOSIS — R92.2 DENSE BREAST TISSUE: ICD-10-CM

## 2021-01-21 DIAGNOSIS — Z12.39 BREAST CANCER SCREENING OTHER THAN MAMMOGRAM: ICD-10-CM

## 2021-01-21 PROCEDURE — 76641 ULTRASOUND BREAST COMPLETE: CPT

## 2021-01-21 PROCEDURE — 76377 3D RENDER W/INTRP POSTPROCES: CPT

## 2021-03-10 DIAGNOSIS — Z23 ENCOUNTER FOR IMMUNIZATION: ICD-10-CM

## 2021-03-12 ENCOUNTER — IMMUNIZATIONS (OUTPATIENT)
Dept: FAMILY MEDICINE CLINIC | Facility: HOSPITAL | Age: 46
End: 2021-03-12

## 2021-03-12 DIAGNOSIS — Z23 ENCOUNTER FOR IMMUNIZATION: Primary | ICD-10-CM

## 2021-03-12 PROCEDURE — 0001A SARS-COV-2 / COVID-19 MRNA VACCINE (PFIZER-BIONTECH) 30 MCG: CPT

## 2021-03-12 PROCEDURE — 91300 SARS-COV-2 / COVID-19 MRNA VACCINE (PFIZER-BIONTECH) 30 MCG: CPT

## 2021-04-02 ENCOUNTER — IMMUNIZATIONS (OUTPATIENT)
Dept: FAMILY MEDICINE CLINIC | Facility: HOSPITAL | Age: 46
End: 2021-04-02

## 2021-04-02 DIAGNOSIS — Z23 ENCOUNTER FOR IMMUNIZATION: Primary | ICD-10-CM

## 2021-04-02 PROCEDURE — 0002A SARS-COV-2 / COVID-19 MRNA VACCINE (PFIZER-BIONTECH) 30 MCG: CPT

## 2021-04-02 PROCEDURE — 91300 SARS-COV-2 / COVID-19 MRNA VACCINE (PFIZER-BIONTECH) 30 MCG: CPT

## 2021-07-29 ENCOUNTER — HOSPITAL ENCOUNTER (OUTPATIENT)
Dept: MAMMOGRAPHY | Facility: MEDICAL CENTER | Age: 46
Discharge: HOME | End: 2021-07-29
Payer: COMMERCIAL

## 2021-07-29 DIAGNOSIS — Z12.31 SCREENING MAMMOGRAM FOR HIGH-RISK PATIENT: ICD-10-CM

## 2021-07-29 PROCEDURE — 77067 SCR MAMMO BI INCL CAD: CPT

## 2021-07-29 PROCEDURE — 77063 BREAST TOMOSYNTHESIS BI: CPT

## 2021-08-03 ENCOUNTER — OFFICE VISIT (OUTPATIENT)
Dept: FAMILY MEDICINE CLINIC | Facility: CLINIC | Age: 46
End: 2021-08-03
Payer: COMMERCIAL

## 2021-08-03 ENCOUNTER — APPOINTMENT (OUTPATIENT)
Dept: LAB | Facility: MEDICAL CENTER | Age: 46
End: 2021-08-03
Attending: FAMILY MEDICINE
Payer: COMMERCIAL

## 2021-08-03 VITALS
OXYGEN SATURATION: 97 % | SYSTOLIC BLOOD PRESSURE: 122 MMHG | RESPIRATION RATE: 18 BRPM | HEIGHT: 62 IN | TEMPERATURE: 98.2 F | DIASTOLIC BLOOD PRESSURE: 72 MMHG | HEART RATE: 74 BPM | BODY MASS INDEX: 30.66 KG/M2 | WEIGHT: 166.6 LBS

## 2021-08-03 DIAGNOSIS — F41.9 ANXIETY: ICD-10-CM

## 2021-08-03 DIAGNOSIS — Z11.59 NEED FOR HEPATITIS C SCREENING TEST: ICD-10-CM

## 2021-08-03 DIAGNOSIS — Z13.220 SCREENING FOR HYPERLIPIDEMIA: ICD-10-CM

## 2021-08-03 DIAGNOSIS — E55.9 VITAMIN D DEFICIENCY: ICD-10-CM

## 2021-08-03 DIAGNOSIS — F42.9 OBSESSIVE-COMPULSIVE DISORDER, UNSPECIFIED TYPE: ICD-10-CM

## 2021-08-03 DIAGNOSIS — Z00.00 ANNUAL PHYSICAL EXAM: Primary | ICD-10-CM

## 2021-08-03 DIAGNOSIS — R53.83 FATIGUE, UNSPECIFIED TYPE: ICD-10-CM

## 2021-08-03 LAB
25(OH)D3 SERPL-MCNC: 43.4 NG/ML (ref 30–100)
ALBUMIN SERPL BCP-MCNC: 3.9 G/DL (ref 3.5–5)
ALP SERPL-CCNC: 71 U/L (ref 46–116)
ALT SERPL W P-5'-P-CCNC: 24 U/L (ref 12–78)
ANION GAP SERPL CALCULATED.3IONS-SCNC: 2 MMOL/L (ref 4–13)
AST SERPL W P-5'-P-CCNC: 11 U/L (ref 5–45)
BASOPHILS # BLD AUTO: 0.03 THOUSANDS/ΜL (ref 0–0.1)
BASOPHILS NFR BLD AUTO: 1 % (ref 0–1)
BILIRUB SERPL-MCNC: 0.55 MG/DL (ref 0.2–1)
BUN SERPL-MCNC: 14 MG/DL (ref 5–25)
CALCIUM SERPL-MCNC: 9.2 MG/DL (ref 8.3–10.1)
CHLORIDE SERPL-SCNC: 106 MMOL/L (ref 100–108)
CHOLEST SERPL-MCNC: 172 MG/DL (ref 50–200)
CO2 SERPL-SCNC: 31 MMOL/L (ref 21–32)
CREAT SERPL-MCNC: 0.66 MG/DL (ref 0.6–1.3)
EOSINOPHIL # BLD AUTO: 0.14 THOUSAND/ΜL (ref 0–0.61)
EOSINOPHIL NFR BLD AUTO: 2 % (ref 0–6)
ERYTHROCYTE [DISTWIDTH] IN BLOOD BY AUTOMATED COUNT: 13 % (ref 11.6–15.1)
GFR SERPL CREATININE-BSD FRML MDRD: 106 ML/MIN/1.73SQ M
GLUCOSE P FAST SERPL-MCNC: 89 MG/DL (ref 65–99)
HCT VFR BLD AUTO: 39.2 % (ref 34.8–46.1)
HCV AB SER QL: NORMAL
HDLC SERPL-MCNC: 57 MG/DL
HGB BLD-MCNC: 13 G/DL (ref 11.5–15.4)
IMM GRANULOCYTES # BLD AUTO: 0.01 THOUSAND/UL (ref 0–0.2)
IMM GRANULOCYTES NFR BLD AUTO: 0 % (ref 0–2)
LDLC SERPL CALC-MCNC: 100 MG/DL (ref 0–100)
LYMPHOCYTES # BLD AUTO: 2.13 THOUSANDS/ΜL (ref 0.6–4.47)
LYMPHOCYTES NFR BLD AUTO: 35 % (ref 14–44)
MCH RBC QN AUTO: 29.8 PG (ref 26.8–34.3)
MCHC RBC AUTO-ENTMCNC: 33.2 G/DL (ref 31.4–37.4)
MCV RBC AUTO: 90 FL (ref 82–98)
MONOCYTES # BLD AUTO: 0.5 THOUSAND/ΜL (ref 0.17–1.22)
MONOCYTES NFR BLD AUTO: 8 % (ref 4–12)
NEUTROPHILS # BLD AUTO: 3.22 THOUSANDS/ΜL (ref 1.85–7.62)
NEUTS SEG NFR BLD AUTO: 54 % (ref 43–75)
NONHDLC SERPL-MCNC: 115 MG/DL
NRBC BLD AUTO-RTO: 0 /100 WBCS
PLATELET # BLD AUTO: 246 THOUSANDS/UL (ref 149–390)
PMV BLD AUTO: 11.1 FL (ref 8.9–12.7)
POTASSIUM SERPL-SCNC: 4.6 MMOL/L (ref 3.5–5.3)
PROT SERPL-MCNC: 7.7 G/DL (ref 6.4–8.2)
RBC # BLD AUTO: 4.36 MILLION/UL (ref 3.81–5.12)
SODIUM SERPL-SCNC: 139 MMOL/L (ref 136–145)
TRIGL SERPL-MCNC: 75 MG/DL
TSH SERPL DL<=0.05 MIU/L-ACNC: 1.3 UIU/ML (ref 0.36–3.74)
WBC # BLD AUTO: 6.03 THOUSAND/UL (ref 4.31–10.16)

## 2021-08-03 PROCEDURE — 86803 HEPATITIS C AB TEST: CPT

## 2021-08-03 PROCEDURE — 80061 LIPID PANEL: CPT

## 2021-08-03 PROCEDURE — 1036F TOBACCO NON-USER: CPT | Performed by: FAMILY MEDICINE

## 2021-08-03 PROCEDURE — 99396 PREV VISIT EST AGE 40-64: CPT | Performed by: FAMILY MEDICINE

## 2021-08-03 PROCEDURE — 84443 ASSAY THYROID STIM HORMONE: CPT

## 2021-08-03 PROCEDURE — 80053 COMPREHEN METABOLIC PANEL: CPT

## 2021-08-03 PROCEDURE — 36415 COLL VENOUS BLD VENIPUNCTURE: CPT

## 2021-08-03 PROCEDURE — 85025 COMPLETE CBC W/AUTO DIFF WBC: CPT

## 2021-08-03 PROCEDURE — 82306 VITAMIN D 25 HYDROXY: CPT

## 2021-08-03 PROCEDURE — 3008F BODY MASS INDEX DOCD: CPT | Performed by: FAMILY MEDICINE

## 2021-08-03 PROCEDURE — 3725F SCREEN DEPRESSION PERFORMED: CPT | Performed by: FAMILY MEDICINE

## 2021-08-03 NOTE — PROGRESS NOTES
ADULT ANNUAL PHYSICAL  [facility] PRIMARY CARE    NAME: Joanna Hawkins  AGE: 51 y o  SEX: female  : 1975     DATE: 8/3/2021     Assessment and Plan:     Problem List Items Addressed This Visit        Other    Anxiety     She is stable on current regimen  She will cont psych f/u at [facility]         OCD (obsessive compulsive disorder)     Stable  She will cont f/u at [facility]           Other Visit Diagnoses     Annual physical exam    -  Primary          Immunizations and preventive care screenings were discussed with patient today  Appropriate education was printed on patient's after visit summary  Counseling:  · Exercise: the importance of regular exercise/physical activity was discussed  Recommend exercise 3-5 times per week for at least 30 minutes  BMI Counseling: Body mass index is 30 47 kg/m²  The BMI is above normal  Nutrition recommendations include encouraging healthy choices of fruits and vegetables  Exercise recommendations include moderate physical activity 150 minutes/week  No pharmacotherapy was ordered  No follow-ups on file  Chief Complaint:     Chief Complaint   Patient presents with    Physical Exam      History of Present Illness:     Adult Annual Physical   Patient here for a comprehensive physical exam  The patient reports no problems  Diet and Physical Activity  · Diet/Nutrition: well balanced diet, limited junk food and consuming 3-5 servings of fruits/vegetables daily  · Exercise: walking and 3-4 times a week on average        Depression Screening  PHQ-9 Depression Screening    PHQ-9:   Frequency of the following problems over the past two weeks:      Little interest or pleasure in doing things: 0 - not at all  Feeling down, depressed, or hopeless: 0 - not at all  Trouble falling or staying asleep, or sleeping too much: 0 - not at all  Feeling tired or having little energy: 0 - not at all  Poor appetite or overeating: 0 - not at all  Feeling bad about yourself - or that you are a failure or have let yourself or your family down: 0 - not at all  Trouble concentrating on things, such as reading the newspaper or watching television: 0 - not at all  Moving or speaking so slowly that other people could have noticed  Or the opposite - being so fidgety or restless that you have been moving around a lot more than usual: 0 - not at all  Thoughts that you would be better off dead, or of hurting yourself in some way: 0 - not at all  PHQ-2 Score: 0  PHQ-9 Score: 0       General Health  · Sleep: sleeps well and gets 7-8 hours of sleep on average  · Hearing: normal - bilateral   · Vision: most recent eye exam <1 year ago and wears glasses  · Dental: regular dental visits, brushes teeth twice daily and flosses regularly  /GYN Health  · Patient is: premenopausal  · Last menstrual period: July 4th, 2021  · Contraceptive method: none  Review of Systems:     Review of Systems   Constitutional: Negative for activity change, chills, fatigue and fever  HENT: Negative for congestion, ear pain, sinus pressure and sore throat  Eyes: Negative for pain and visual disturbance  Respiratory: Negative for cough, chest tightness, shortness of breath and wheezing  Cardiovascular: Negative for chest pain, palpitations and leg swelling  Gastrointestinal: Negative for abdominal pain, blood in stool, constipation, diarrhea, nausea and vomiting  Endocrine: Negative for polydipsia and polyuria  Genitourinary: Negative for difficulty urinating, dysuria, frequency and urgency  Musculoskeletal: Negative for arthralgias, joint swelling and myalgias  Skin: Negative for rash  Neurological: Negative for dizziness, weakness, numbness and headaches  Hematological: Negative for adenopathy  Does not bruise/bleed easily  Psychiatric/Behavioral: Negative for dysphoric mood  The patient is not nervous/anxious         Past Medical History: Past Medical History:   Diagnosis Date    Acute sinusitis     Anxiety     Dense breast tissue     History of early menarche     AGE 14    IBS (irritable bowel syndrome)     OCD (obsessive compulsive disorder)     Panic attacks     Pilonidal cyst     PONV (postoperative nausea and vomiting)     Seasonal allergies     Sinus problem     Spontaneous      Vitamin D deficiency       Past Surgical History:     Past Surgical History:   Procedure Laterality Date    BREAST CYST EXCISION Right 2001    benign    BREAST SURGERY      EXCISION OF BREAST SINGLE LESION     DILATION AND CURETTAGE, DIAGNOSTIC / THERAPEUTIC      MOUTH SURGERY      WISDOM TEETH 1996    OTHER SURGICAL HISTORY      PILONIDAL CYST RESECTION    SKIN BIOPSY        Social History:     Social History     Socioeconomic History    Marital status: /Civil Union     Spouse name: None    Number of children: None    Years of education: None    Highest education level: None   Occupational History    None   Tobacco Use    Smoking status: Never Smoker    Smokeless tobacco: Never Used   Substance and Sexual Activity    Alcohol use: No    Drug use: No    Sexual activity: Yes     Partners: Male   Other Topics Concern    None   Social History Narrative    ALWAYS USES SEAT BELT     DAILY CAFFEINE CONSUMPTION      Social Determinants of Health     Financial Resource Strain:     Difficulty of Paying Living Expenses:    Food Insecurity:     Worried About Running Out of Food in the Last Year:     Ran Out of Food in the Last Year:    Transportation Needs:     Lack of Transportation (Medical):      Lack of Transportation (Non-Medical):    Physical Activity:     Days of Exercise per Week:     Minutes of Exercise per Session:    Stress:     Feeling of Stress :    Social Connections:     Frequency of Communication with Friends and Family:     Frequency of Social Gatherings with Friends and Family:     Attends Sabianist Services:     Active Member of Clubs or Organizations:     Attends Club or Organization Meetings:     Marital Status:    Intimate Partner Violence:     Fear of Current or Ex-Partner:     Emotionally Abused:     Physically Abused:     Sexually Abused:       Family History:     Family History   Problem Relation Age of Onset    Colon cancer Mother         AGE 62     Diabetes Mother     Gallbladder disease Mother     Hypertension Mother     Irritable bowel syndrome Mother     Kidney disease Mother         RECURRENT    Diabetes Father     Hypertension Father     Melanoma Brother         AGE 37    Breast cancer Maternal Grandmother         AGE 55     Stomach cancer Paternal Grandmother         AGE 55     Ovarian cancer Paternal Grandmother         AGE 55     Pancreatic cancer Maternal Uncle         UNSPECIFIED LOACTION OF MALIGNANCY     No Known Problems Daughter     No Known Problems Maternal Grandfather     No Known Problems Paternal Grandfather     No Known Problems Daughter     No Known Problems Maternal Aunt     No Known Problems Paternal Aunt     No Known Problems Paternal Aunt     No Known Problems Maternal Uncle     No Known Problems Paternal Uncle     No Known Problems Paternal Uncle     No Known Problems Paternal Uncle       Current Medications:     Current Outpatient Medications   Medication Sig Dispense Refill    cetirizine (ZyrTEC) 10 mg tablet Take 10 mg by mouth daily      Cholecalciferol (VITAMIN D) 50 MCG (2000 UT) CAPS Take 2,000 Units by mouth 2 (two) times a day       clonazePAM (KlonoPIN) 0 5 mg tablet 0 5 mg daily at bedtime        FLUoxetine (PROzac) 20 mg capsule 20 mg daily at bedtime        fluticasone (FLONASE) 50 mcg/act nasal spray 1 spray into each nostril as needed for rhinitis      QUEtiapine (SEROquel XR) 50 mg Take 50 mg by mouth daily at bedtime       No current facility-administered medications for this visit  Allergies:      Allergies Allergen Reactions    Ibuprofen Hives    Quetiapine Hives     [facility] - [id number]: May have extended release Seraquil    Sulfa Antibiotics Hives and Rash    Sulfamethoxazole-Trimethoprim Hives and Rash      Physical Exam:     /72 (BP Location: Left arm, Patient Position: Sitting, Cuff Size: Large)   Pulse 74   Temp 98 2 °F (36 8 °C) (Tympanic)   Resp 18   Ht 5' 2" (1 575 m)   Wt 75 6 kg (166 lb 9 6 oz)   LMP 07/04/2021   SpO2 97%   BMI 30 47 kg/m²     Physical Exam  Vitals and nursing note reviewed  Constitutional:       Appearance: Normal appearance  She is well-developed  HENT:      Head: Normocephalic and atraumatic  Right Ear: Tympanic membrane, ear canal and external ear normal       Left Ear: Tympanic membrane, ear canal and external ear normal       Mouth/Throat:      Mouth: Mucous membranes are moist    Eyes:      Pupils: Pupils are equal, round, and reactive to light  Neck:      Thyroid: No thyromegaly  Cardiovascular:      Rate and Rhythm: Normal rate and regular rhythm  Heart sounds: Normal heart sounds  No murmur heard  Pulmonary:      Effort: Pulmonary effort is normal  No respiratory distress  Breath sounds: Normal breath sounds  Abdominal:      General: Bowel sounds are normal  There is no distension  Palpations: Abdomen is soft  There is no mass  Tenderness: There is no right CVA tenderness or left CVA tenderness  Musculoskeletal:         General: Normal range of motion  Cervical back: Normal range of motion and neck supple  Lymphadenopathy:      Cervical: No cervical adenopathy  Skin:     General: Skin is warm and dry  Findings: No erythema or rash  Neurological:      General: No focal deficit present  Mental Status: She is alert and oriented to person, place, and time  Cranial Nerves: No cranial nerve deficit        Deep Tendon Reflexes: Reflexes normal    Psychiatric:         Mood and Affect: Mood normal  Behavior: Behavior normal           MD Yves Marcial [id number]

## 2021-08-03 NOTE — PATIENT INSTRUCTIONS

## 2021-08-26 ENCOUNTER — ANNUAL EXAM (OUTPATIENT)
Dept: OBGYN CLINIC | Facility: CLINIC | Age: 46
End: 2021-08-26
Payer: COMMERCIAL

## 2021-08-26 VITALS
BODY MASS INDEX: 30.73 KG/M2 | HEIGHT: 62 IN | WEIGHT: 167 LBS | SYSTOLIC BLOOD PRESSURE: 138 MMHG | DIASTOLIC BLOOD PRESSURE: 82 MMHG

## 2021-08-26 DIAGNOSIS — Z01.419 ENCNTR FOR GYN EXAM (GENERAL) (ROUTINE) W/O ABN FINDINGS: Primary | ICD-10-CM

## 2021-08-26 DIAGNOSIS — Z12.31 ENCOUNTER FOR SCREENING MAMMOGRAM FOR MALIGNANT NEOPLASM OF BREAST: ICD-10-CM

## 2021-08-26 DIAGNOSIS — Z01.419 PAP SMEAR, AS PART OF ROUTINE GYNECOLOGICAL EXAMINATION: ICD-10-CM

## 2021-08-26 PROCEDURE — S0612 ANNUAL GYNECOLOGICAL EXAMINA: HCPCS | Performed by: OBSTETRICS & GYNECOLOGY

## 2021-08-26 PROCEDURE — 3008F BODY MASS INDEX DOCD: CPT | Performed by: FAMILY MEDICINE

## 2021-08-26 PROCEDURE — G0145 SCR C/V CYTO,THINLAYER,RESCR: HCPCS | Performed by: OBSTETRICS & GYNECOLOGY

## 2021-08-26 NOTE — PROGRESS NOTES
Assessment/Plan:    No problem-specific Assessment & Plan notes found for this encounter  Diagnoses and all orders for this visit:    Encntr for gyn exam (general) (routine) w/o abn findings  -     Liquid-based pap, screening    Pap smear, as part of routine gynecological examination    Encounter for screening mammogram for malignant neoplasm of breast  -     Mammo screening bilateral w 3d & cad; Future          Normal gynecological physical examination  Self-breast examination stressed  Mammogram ordered  Discussed regular exercise, healthy diet, importance of vitamin D and calcium supplements  Discussed importance of sun block use during periods of prolonged sun exposure  Patient will be seen in 1 year for routine gynecologic and medical examination  Patient will call office for any problems, concerns, or issues which may arise during the interim  Subjective:      Patient ID: Rm Mariee is a 51 y o  female  Patient is a 43-year-old female who presents today for her annual gynecologic and medical examination     Patient experiencing her menstrual cycles every  few months  She states that they last for several days and not accompanied by any significant bleeding clotting or cramping  She also denies any bleeding or spotting between her cycles as well  Patient denies any significant vasomotor symptoms at this time     She reports normal appetite     Patient also reports normal bowel and bladder habits     Patient denies any significant pelvic or abdominal pain     Patient denies any headaches, chest pain, shortness of breath fever shakes or chills  Patient also denies any COVID-19 symptoms including cough or loss of taste or smell    Patient has received the COVID  Vaccine    Patient is not being followed for any significant medical problems at this time     Patient does regular self-breast examinations and is up-to-date with screening mammography    Appropriate arrangements for her annual screening mammogram replaced into the EMR system at today's visit  The following portions of the patient's history were reviewed and updated as appropriate: allergies, current medications, past family history, past medical history, past social history, past surgical history and problem list     Review of Systems   Constitutional: Negative  Negative for appetite change, diaphoresis, fatigue, fever and unexpected weight change  HENT: Negative  Eyes: Negative  Respiratory: Negative  Cardiovascular: Negative  Gastrointestinal: Negative  Negative for abdominal pain, blood in stool, constipation, diarrhea, nausea and vomiting  Endocrine: Negative  Negative for cold intolerance and heat intolerance  Genitourinary: Negative  Negative for dysuria, frequency, hematuria, urgency, vaginal bleeding, vaginal discharge and vaginal pain  Musculoskeletal: Negative  Skin: Negative  Allergic/Immunologic: Negative  Neurological: Negative  Hematological: Negative  Negative for adenopathy  Psychiatric/Behavioral: Negative  Objective:      /82 (BP Location: Left arm, Patient Position: Sitting, Cuff Size: Standard)   Ht 5' 2" (1 575 m)   Wt 75 8 kg (167 lb)   LMP 07/04/2021   BMI 30 54 kg/m²          Physical Exam  Constitutional:       General: She is not in acute distress  Appearance: Normal appearance  She is well-developed  She is not diaphoretic  HENT:      Head: Normocephalic and atraumatic  Eyes:      Pupils: Pupils are equal, round, and reactive to light  Cardiovascular:      Rate and Rhythm: Normal rate and regular rhythm  Heart sounds: Normal heart sounds  No murmur heard  No friction rub  No gallop  Pulmonary:      Effort: Pulmonary effort is normal       Breath sounds: Normal breath sounds  Chest:      Breasts: Breasts are symmetrical          Right: No inverted nipple, mass, nipple discharge, skin change or tenderness  Left: No inverted nipple, mass, nipple discharge, skin change or tenderness  Abdominal:      General: Bowel sounds are normal       Palpations: Abdomen is soft  Genitourinary:     General: Normal vulva  Exam position: Supine  Labia:         Right: No rash or lesion  Left: No rash or lesion  Vagina: Normal  No vaginal discharge, erythema, tenderness or bleeding  Cervix: No discharge or friability  Uterus: Not enlarged and not tender  Adnexa:         Right: No mass, tenderness or fullness  Left: No mass, tenderness or fullness  Rectum: Normal  Guaiac result negative  Musculoskeletal:         General: Normal range of motion  Cervical back: Normal range of motion and neck supple  Lymphadenopathy:      Cervical: No cervical adenopathy  Upper Body:      Right upper body: No supraclavicular adenopathy  Left upper body: No supraclavicular adenopathy  Skin:     General: Skin is warm and dry  Findings: No rash  Neurological:      General: No focal deficit present  Mental Status: She is alert and oriented to person, place, and time  Psychiatric:         Mood and Affect: Mood normal          Speech: Speech normal          Behavior: Behavior normal          Thought Content:  Thought content normal          Judgment: Judgment normal

## 2021-09-01 LAB
LAB AP GYN PRIMARY INTERPRETATION: NORMAL
Lab: NORMAL

## 2021-11-17 ENCOUNTER — OFFICE VISIT (OUTPATIENT)
Dept: SURGICAL ONCOLOGY | Facility: CLINIC | Age: 46
End: 2021-11-17
Payer: COMMERCIAL

## 2021-11-17 VITALS
DIASTOLIC BLOOD PRESSURE: 70 MMHG | RESPIRATION RATE: 16 BRPM | HEIGHT: 62 IN | BODY MASS INDEX: 31.28 KG/M2 | WEIGHT: 170 LBS | SYSTOLIC BLOOD PRESSURE: 120 MMHG | HEART RATE: 63 BPM | OXYGEN SATURATION: 98 % | TEMPERATURE: 99.8 F

## 2021-11-17 DIAGNOSIS — Z12.31 SCREENING MAMMOGRAM, ENCOUNTER FOR: ICD-10-CM

## 2021-11-17 DIAGNOSIS — Z12.39 BREAST CANCER SCREENING OTHER THAN MAMMOGRAM: ICD-10-CM

## 2021-11-17 DIAGNOSIS — Z80.3 FAMILY HISTORY OF BREAST CANCER IN FEMALE: ICD-10-CM

## 2021-11-17 DIAGNOSIS — R92.2 DENSE BREAST TISSUE: Primary | ICD-10-CM

## 2021-11-17 DIAGNOSIS — Z13.71 BRCA NEGATIVE: ICD-10-CM

## 2021-11-17 PROCEDURE — 99213 OFFICE O/P EST LOW 20 MIN: CPT | Performed by: SURGERY

## 2021-11-17 PROCEDURE — 1036F TOBACCO NON-USER: CPT | Performed by: SURGERY

## 2021-11-17 PROCEDURE — 3008F BODY MASS INDEX DOCD: CPT | Performed by: SURGERY

## 2022-01-24 ENCOUNTER — HOSPITAL ENCOUNTER (OUTPATIENT)
Dept: ULTRASOUND IMAGING | Facility: CLINIC | Age: 47
Discharge: HOME | End: 2022-01-24
Payer: COMMERCIAL

## 2022-01-24 VITALS — BODY MASS INDEX: 30.36 KG/M2 | WEIGHT: 165 LBS | HEIGHT: 62 IN

## 2022-01-24 DIAGNOSIS — R92.2 DENSE BREAST TISSUE: ICD-10-CM

## 2022-01-24 DIAGNOSIS — Z12.39 BREAST CANCER SCREENING OTHER THAN MAMMOGRAM: ICD-10-CM

## 2022-01-24 PROCEDURE — 76641 ULTRASOUND BREAST COMPLETE: CPT

## 2022-01-25 ENCOUNTER — TELEPHONE (OUTPATIENT)
Dept: HEMATOLOGY ONCOLOGY | Facility: CLINIC | Age: 47
End: 2022-01-25

## 2022-01-25 ENCOUNTER — TELEPHONE (OUTPATIENT)
Dept: OBGYN CLINIC | Facility: CLINIC | Age: 47
End: 2022-01-25

## 2022-01-25 DIAGNOSIS — R92.8 ABNORMAL FINDING ON BREAST IMAGING: Primary | ICD-10-CM

## 2022-01-25 NOTE — TELEPHONE ENCOUNTER
Returned Hayley's call  She shared she has seen her abnormal ABUS results in [facility] and the recommendation for a left breast US  Spoke with her and confirmed recommendations  She understands  Spoke with Yumi Wagner at the [facility] and she will call Renae Mascorro and schedule her for the imaging  Dr Guillermo Madrigal has placed order

## 2022-01-25 NOTE — TELEPHONE ENCOUNTER
Patient is calling in indicating that she has seen her results and is requesting a call back at [phone]

## 2022-02-01 ENCOUNTER — HOSPITAL ENCOUNTER (OUTPATIENT)
Dept: ULTRASOUND IMAGING | Facility: CLINIC | Age: 47
Discharge: HOME | End: 2022-02-01
Payer: COMMERCIAL

## 2022-02-01 VITALS — BODY MASS INDEX: 30.39 KG/M2 | WEIGHT: 165.12 LBS | HEIGHT: 62 IN

## 2022-02-01 DIAGNOSIS — R92.8 ABNORMAL FINDING ON BREAST IMAGING: ICD-10-CM

## 2022-02-01 PROCEDURE — 76642 ULTRASOUND BREAST LIMITED: CPT

## 2022-02-07 ENCOUNTER — TELEPHONE (OUTPATIENT)
Dept: HEMATOLOGY ONCOLOGY | Facility: CLINIC | Age: 47
End: 2022-02-07

## 2022-02-07 ENCOUNTER — OFFICE VISIT (OUTPATIENT)
Dept: SURGICAL ONCOLOGY | Facility: CLINIC | Age: 47
End: 2022-02-07
Payer: COMMERCIAL

## 2022-02-07 VITALS
SYSTOLIC BLOOD PRESSURE: 120 MMHG | RESPIRATION RATE: 17 BRPM | TEMPERATURE: 99.3 F | HEART RATE: 95 BPM | HEIGHT: 62 IN | OXYGEN SATURATION: 96 % | BODY MASS INDEX: 30.91 KG/M2 | DIASTOLIC BLOOD PRESSURE: 78 MMHG | WEIGHT: 168 LBS

## 2022-02-07 DIAGNOSIS — N63.10 LUMP OF RIGHT BREAST: Primary | ICD-10-CM

## 2022-02-07 PROBLEM — N63.12 BREAST LUMP ON RIGHT SIDE AT 1 O'CLOCK POSITION: Status: ACTIVE | Noted: 2022-02-07

## 2022-02-07 PROCEDURE — 3008F BODY MASS INDEX DOCD: CPT

## 2022-02-07 PROCEDURE — 99213 OFFICE O/P EST LOW 20 MIN: CPT

## 2022-02-07 PROCEDURE — 1036F TOBACCO NON-USER: CPT

## 2022-02-07 NOTE — PROGRESS NOTES
Surgical Oncology Follow Up       [facility] [id number]    Edison Baeza  1975  [id number]  [facility]  [address] [id number]    Chief Complaint   Patient presents with    Follow-up       Assessment/Plan:  1  Lump of right breast  - 6 month routine follow up w Dr Michelle Grady scheduled       Discussion/Summary: Patient is a 51year old female with an increased risk of breast cancer and a family history of breast cancer  Patient did have genetic testing which was negative  Patient presented today after she found a presumed lump on her right breast  Patient stated that she did have a mole removed when she was approximately 15 on the right breast, however she could not remember if this was the location and was concerned this site was a possible breast lump  Upon breast examination, it appeared that this was a scar  No mass or lump was palpated, nor was there any adenopathy  Patient was instructed to contact us if she notices any changes on her breast exam  She is scheduled to see Dr Michelle Grady for her routine 6 month follow up  No additional testing is needed at this time  History of Present Illness:     -Interval History: Patient presented today after she found a lump on her right breast this morning  She had a screening mammogram on 11/17/21 which showed extremely dense tissue and was BI-RADS category 2  She also had an ABUS performed which revealed a left breast mass, however after repeat imaging was found to be artifact  Review of Systems:  Review of Systems   Constitutional: Negative  Negative for fever  Respiratory: Negative for shortness of breath  Psychiatric/Behavioral: The patient is nervous/anxious          Patient Active Problem List   Diagnosis    Abnormal finding on breast imaging    Anxiety    Dense breast tissue    IBS (irritable bowel syndrome)    OCD (obsessive compulsive disorder)    Panic attacks    Seasonal allergies    Vitamin D deficiency    Family history of breast cancer in female    Breast cancer screening other than mammogram    Overweight    Multiple nevi    Lump of right breast     Past Medical History:   Diagnosis Date    Acute sinusitis     Anxiety     Dense breast tissue     History of early menarche     AGE 14    IBS (irritable bowel syndrome)     OCD (obsessive compulsive disorder)     Panic attacks     Pilonidal cyst     PONV (postoperative nausea and vomiting)     Seasonal allergies     Sinus problem     Spontaneous      Vitamin D deficiency      Past Surgical History:   Procedure Laterality Date    BREAST CYST EXCISION Right 2001    benign    BREAST SURGERY      EXCISION OF BREAST SINGLE LESION     DILATION AND CURETTAGE, DIAGNOSTIC / THERAPEUTIC      MOUTH SURGERY      WISDOM TEETH 1996    OTHER SURGICAL HISTORY      PILONIDAL CYST RESECTION    SKIN BIOPSY       Family History   Problem Relation Age of Onset    Colon cancer Mother         AGE 62     Diabetes Mother     Gallbladder disease Mother     Hypertension Mother     Irritable bowel syndrome Mother     Kidney disease Mother         RECURRENT    Diabetes Father     Hypertension Father     Melanoma Brother         AGE 37    Breast cancer Maternal Grandmother         AGE 55     Stomach cancer Paternal Grandmother         AGE 55     Ovarian cancer Paternal Grandmother         AGE 55     Pancreatic cancer Maternal Uncle         UNSPECIFIED LOACTION OF MALIGNANCY     No Known Problems Daughter     No Known Problems Maternal Grandfather     No Known Problems Paternal Grandfather     No Known Problems Daughter     No Known Problems Maternal Aunt     No Known Problems Paternal Aunt     No Known Problems Paternal Aunt     No Known Problems Maternal Uncle     No Known Problems Paternal Uncle     No Known Problems Paternal Uncle     No Known Problems Paternal Uncle      Social History     Socioeconomic History    Marital status: /Civil Union     Spouse name: Not on file    Number of children: Not on file    Years of education: Not on file    Highest education level: Not on file   Occupational History    Not on file   Tobacco Use    Smoking status: Never Smoker    Smokeless tobacco: Never Used   Substance and Sexual Activity    Alcohol use: No    Drug use: No    Sexual activity: Yes     Partners: Male   Other Topics Concern    Not on file   Social History Narrative    ALWAYS USES SEAT BELT     DAILY CAFFEINE CONSUMPTION      Social Determinants of Health     Financial Resource Strain: Not on file   Food Insecurity: Not on file   Transportation Needs: Not on file   Physical Activity: Not on file   Stress: Not on file   Social Connections: Not on file   Intimate Partner Violence: Not on file   Housing Stability: Not on file       Current Outpatient Medications:     cetirizine (ZyrTEC) 10 mg tablet, Take 10 mg by mouth daily, Disp: , Rfl:     Cholecalciferol (VITAMIN D) 50 MCG (2000 UT) CAPS, Take 2,000 Units by mouth 2 (two) times a day , Disp: , Rfl:     clonazePAM (KlonoPIN) 0 5 mg tablet, 0 5 mg daily at bedtime  , Disp: , Rfl:     FLUoxetine (PROzac) 20 mg capsule, 20 mg daily at bedtime  , Disp: , Rfl:     fluticasone (FLONASE) 50 mcg/act nasal spray, 1 spray into each nostril as needed for rhinitis, Disp: , Rfl:     QUEtiapine (SEROquel XR) 50 mg, Take 50 mg by mouth daily at bedtime, Disp: , Rfl:   Allergies   Allergen Reactions    Ibuprofen Hives    Quetiapine Hives     Annotation - [id number]: May have extended release Seraquil    Sulfa Antibiotics Hives and Rash    Sulfamethoxazole-Trimethoprim Hives and Rash     Vitals:    02/07/22 1256   BP: 120/78   Pulse: 95   Resp: 17   Temp: 99 3 °F (37 4 °C)   SpO2: 96%       Physical Exam  Constitutional:       Appearance: Normal appearance  Chest:   Breasts:      Right: Normal  No mass, nipple discharge, skin change, tenderness, axillary adenopathy or supraclavicular adenopathy  Left: Normal  No mass, nipple discharge, skin change or tenderness  Comments: Scar noted from previous mole removal, no dominant masses appreciated in this area  Lymphadenopathy:      Upper Body:      Right upper body: No supraclavicular or axillary adenopathy  Neurological:      Mental Status: She is alert and oriented to person, place, and time  Mental status is at baseline  Results:    Imaging  US breast left limited (diagnostic)    Addendum Date: 2/1/2022 Addendum:   Comparison:  Multiple prior exams dating between 01/09/2017 and 01/24/2022  Result Date: 2/1/2022  Narrative: DIAGNOSIS: Abnormal finding on breast imaging TECHNIQUE: Ultrasound of the left breast(s) was performed  COMPARISONS: Prior breast imaging dated: 01/24/2022 RELEVANT HISTORY: Family Breast Cancer History: History of breast cancer in Maternal Grandmother  Family Medical History: Family medical history includes breast cancer in maternal grandmother, colon cancer in mother, and ovarian cancer in paternal grandmother  Personal History: No known relevant hormone history  Surgical history includes breast excisional biopsy  No known relevant medical history  RISK ASSESSMENT: 5 Year Tyrer-Cuzick: 2 87 % 10 Year Tyrer-Cuzick: 6 11 % Lifetime Tyrer-Cuzick: 27 31 % INDICATION: Janella Crigler is a 51 y o  female presenting for Abnormal abus  FINDINGS: Left breast ultrasound:  Targeted breast ultrasound was performed using a dedicated high-resolution probe  Previously noted shadowing mass in the 1 o'clock position of the breast, 6 cm from nipple is not seen  A probably represented artifact  Circumscribed hypoechoic mass in the 2 o'clock position the left breast, 10 cm from nipple demonstrates long-term stability and likely represents a fibroadenoma        Impression:  No evidence of malignancy  This patient has been identified as being at elevated risk for development of breast cancer based on the Tyrer-Cuzick model  She may benefit from supplemental screening  ASSESSMENT/BI-RADS CATEGORY: Left: 2 - Benign Overall: 2 - Benign RECOMMENDATION:      - Return to routine screening for the left breast  Workstation ID: [id number]    US breast screening bilateral complete (ABUS)    Result Date: 1/25/2022  Narrative: DIAGNOSIS: Dense breast tissue; Breast cancer screening other than mammogram TECHNIQUE: Automated breast ultrasound images were obtained on the [employer] system  Imaging was obtained in standard projections including AP, lateral and medial for both breasts, inclusive of all four quadrants  COMPARISONS: Prior breast imaging dated: 07/29/2021, 01/21/2021, 07/17/2020, 01/17/2020, 01/17/2020, 07/16/2019, 07/16/2019, 03/14/2019, 01/10/2019, and 01/10/2019 RELEVANT HISTORY: Family Breast Cancer History: History of breast cancer in Maternal Grandmother  Family Medical History: Family medical history includes breast cancer in maternal grandmother, colon cancer in mother, and ovarian cancer in paternal grandmother  Personal History: No known relevant hormone history  Surgical history includes breast excisional biopsy  No known relevant medical history  RISK ASSESSMENT: 5 Year Tyrer-Cuzick: 2 87 % 10 Year Tyrer-Cuzick: 6 11 % Lifetime Tyrer-Cuzick: 27 31 % TISSUE COMPOSITION: Homogeneous background echotexture - fibroglandular INDICATION: Elizabeth Chavez is a 51 y o  female with dense breasts presenting for automated breast ultrasound screening  FINDINGS: LEFT B) MASS: There is a 13 mm x 10 mm irregularly shaped, non-parallel, hypoechoic mass with indistinct margins with shadowing seen in the left breast at 1 o'clock, 6 cm from the nipple  Hand-held ultrasound recommended for confirmation  Right There are no suspicious masses or areas of architectural distortion       Impression:  Targeted, hand-held ultrasound recommended for possible shadowing mass 01:00 o'clock left breast, 6 cm from the nipple  Automated breast ultrasound is an adjunct, not a replacement, for routine yearly screening mammography  ASSESSMENT/BI-RADS CATEGORY: Left: 0 - Incomplete: Needs Additional Imaging Evaluation Right: 1 - Negative Overall: 0 - Incomplete: Needs Additional Imaging Evaluation RECOMMENDATION:      - Ultrasound at the current time for the left breast       - Return to routine screening for the right breast  Workstation ID: [id number]      I reviewed the above imaging data  Advance Care Planning/Advance Directives:  Discussed disease status and treatment goals with the patient

## 2022-08-01 ENCOUNTER — HOSPITAL ENCOUNTER (OUTPATIENT)
Dept: MAMMOGRAPHY | Facility: MEDICAL CENTER | Age: 47
Discharge: HOME | End: 2022-08-01
Payer: COMMERCIAL

## 2022-08-01 VITALS — BODY MASS INDEX: 30.91 KG/M2 | HEIGHT: 62 IN | WEIGHT: 167.99 LBS

## 2022-08-01 DIAGNOSIS — Z12.31 SCREENING MAMMOGRAM, ENCOUNTER FOR: ICD-10-CM

## 2022-08-01 PROCEDURE — 77067 SCR MAMMO BI INCL CAD: CPT

## 2022-08-01 PROCEDURE — 77063 BREAST TOMOSYNTHESIS BI: CPT

## 2022-08-03 ENCOUNTER — TELEPHONE (OUTPATIENT)
Dept: HEMATOLOGY ONCOLOGY | Facility: CLINIC | Age: 47
End: 2022-08-03

## 2022-08-03 NOTE — TELEPHONE ENCOUNTER
Returned call to [facility] regarding her mammogram results  Informed her that her recent mammogram report is not finalized as of the present time  She does have [facility]  Directed her it will be in her [facility] when finalized or she can call the office in a couple days  She understood and was agreeable

## 2022-08-03 NOTE — TELEPHONE ENCOUNTER
CALL TRANSFER   Reason for patient call? Patient calling in to discuss her mammogram results  Patient's primary physician? Dr Rona David    RN call was transferred to and time it was transferred? Kaylah,3:10pm   Informed patient that the message will be forwarded to the team and someone will get back to them as soon as possible    Did you relay this information to the patient?   Yes

## 2022-09-14 ENCOUNTER — OFFICE VISIT (OUTPATIENT)
Dept: FAMILY MEDICINE CLINIC | Facility: CLINIC | Age: 47
End: 2022-09-14
Payer: COMMERCIAL

## 2022-09-14 ENCOUNTER — APPOINTMENT (OUTPATIENT)
Dept: LAB | Facility: MEDICAL CENTER | Age: 47
End: 2022-09-14
Payer: COMMERCIAL

## 2022-09-14 VITALS
DIASTOLIC BLOOD PRESSURE: 80 MMHG | OXYGEN SATURATION: 99 % | BODY MASS INDEX: 31.28 KG/M2 | HEART RATE: 82 BPM | HEIGHT: 62 IN | SYSTOLIC BLOOD PRESSURE: 122 MMHG | WEIGHT: 170 LBS

## 2022-09-14 DIAGNOSIS — F41.9 ANXIETY: ICD-10-CM

## 2022-09-14 DIAGNOSIS — E55.9 VITAMIN D DEFICIENCY: ICD-10-CM

## 2022-09-14 DIAGNOSIS — E66.9 OBESITY (BMI 30.0-34.9): ICD-10-CM

## 2022-09-14 DIAGNOSIS — Z13.220 LIPID SCREENING: ICD-10-CM

## 2022-09-14 DIAGNOSIS — Z91.89 AT HIGH RISK FOR ADVERSE MEDICATION EVENT: ICD-10-CM

## 2022-09-14 DIAGNOSIS — Z23 ENCOUNTER FOR IMMUNIZATION: ICD-10-CM

## 2022-09-14 DIAGNOSIS — Z13.1 DIABETES MELLITUS SCREENING: ICD-10-CM

## 2022-09-14 DIAGNOSIS — Z00.00 ANNUAL PHYSICAL EXAM: ICD-10-CM

## 2022-09-14 DIAGNOSIS — Z80.3 FAMILY HISTORY OF BREAST CANCER IN FEMALE: ICD-10-CM

## 2022-09-14 DIAGNOSIS — Z00.00 ANNUAL PHYSICAL EXAM: Primary | ICD-10-CM

## 2022-09-14 LAB
25(OH)D3 SERPL-MCNC: 51.1 NG/ML (ref 30–100)
ALBUMIN SERPL BCP-MCNC: 4.2 G/DL (ref 3.5–5)
ALP SERPL-CCNC: 77 U/L (ref 46–116)
ALT SERPL W P-5'-P-CCNC: 22 U/L (ref 12–78)
ANION GAP SERPL CALCULATED.3IONS-SCNC: 3 MMOL/L (ref 4–13)
AST SERPL W P-5'-P-CCNC: 7 U/L (ref 5–45)
BASOPHILS # BLD AUTO: 0.02 THOUSANDS/ΜL (ref 0–0.1)
BASOPHILS NFR BLD AUTO: 0 % (ref 0–1)
BILIRUB SERPL-MCNC: 0.66 MG/DL (ref 0.2–1)
BUN SERPL-MCNC: 16 MG/DL (ref 5–25)
CALCIUM SERPL-MCNC: 9.4 MG/DL (ref 8.3–10.1)
CHLORIDE SERPL-SCNC: 105 MMOL/L (ref 96–108)
CHOLEST SERPL-MCNC: 173 MG/DL
CO2 SERPL-SCNC: 29 MMOL/L (ref 21–32)
CREAT SERPL-MCNC: 0.74 MG/DL (ref 0.6–1.3)
EOSINOPHIL # BLD AUTO: 0.04 THOUSAND/ΜL (ref 0–0.61)
EOSINOPHIL NFR BLD AUTO: 1 % (ref 0–6)
ERYTHROCYTE [DISTWIDTH] IN BLOOD BY AUTOMATED COUNT: 12.9 % (ref 11.6–15.1)
GFR SERPL CREATININE-BSD FRML MDRD: 96 ML/MIN/1.73SQ M
GLUCOSE P FAST SERPL-MCNC: 96 MG/DL (ref 65–99)
HCT VFR BLD AUTO: 40.8 % (ref 34.8–46.1)
HDLC SERPL-MCNC: 56 MG/DL
HGB BLD-MCNC: 13.3 G/DL (ref 11.5–15.4)
IMM GRANULOCYTES # BLD AUTO: 0.02 THOUSAND/UL (ref 0–0.2)
IMM GRANULOCYTES NFR BLD AUTO: 0 % (ref 0–2)
LDLC SERPL CALC-MCNC: 102 MG/DL (ref 0–100)
LYMPHOCYTES # BLD AUTO: 1.86 THOUSANDS/ΜL (ref 0.6–4.47)
LYMPHOCYTES NFR BLD AUTO: 28 % (ref 14–44)
MCH RBC QN AUTO: 29.5 PG (ref 26.8–34.3)
MCHC RBC AUTO-ENTMCNC: 32.6 G/DL (ref 31.4–37.4)
MCV RBC AUTO: 91 FL (ref 82–98)
MONOCYTES # BLD AUTO: 0.34 THOUSAND/ΜL (ref 0.17–1.22)
MONOCYTES NFR BLD AUTO: 5 % (ref 4–12)
NEUTROPHILS # BLD AUTO: 4.32 THOUSANDS/ΜL (ref 1.85–7.62)
NEUTS SEG NFR BLD AUTO: 66 % (ref 43–75)
NRBC BLD AUTO-RTO: 0 /100 WBCS
PLATELET # BLD AUTO: 246 THOUSANDS/UL (ref 149–390)
PMV BLD AUTO: 11.1 FL (ref 8.9–12.7)
POTASSIUM SERPL-SCNC: 4.5 MMOL/L (ref 3.5–5.3)
PROT SERPL-MCNC: 7.9 G/DL (ref 6.4–8.4)
RBC # BLD AUTO: 4.51 MILLION/UL (ref 3.81–5.12)
SODIUM SERPL-SCNC: 137 MMOL/L (ref 135–147)
TRIGL SERPL-MCNC: 77 MG/DL
TSH SERPL DL<=0.05 MIU/L-ACNC: 0.95 UIU/ML (ref 0.45–4.5)
WBC # BLD AUTO: 6.6 THOUSAND/UL (ref 4.31–10.16)

## 2022-09-14 PROCEDURE — 90686 IIV4 VACC NO PRSV 0.5 ML IM: CPT

## 2022-09-14 PROCEDURE — 80053 COMPREHEN METABOLIC PANEL: CPT

## 2022-09-14 PROCEDURE — 80061 LIPID PANEL: CPT

## 2022-09-14 PROCEDURE — 90471 IMMUNIZATION ADMIN: CPT

## 2022-09-14 PROCEDURE — 99396 PREV VISIT EST AGE 40-64: CPT | Performed by: PHYSICIAN ASSISTANT

## 2022-09-14 PROCEDURE — 36415 COLL VENOUS BLD VENIPUNCTURE: CPT | Performed by: PHYSICIAN ASSISTANT

## 2022-09-14 PROCEDURE — 82306 VITAMIN D 25 HYDROXY: CPT

## 2022-09-14 PROCEDURE — 84443 ASSAY THYROID STIM HORMONE: CPT

## 2022-09-14 PROCEDURE — 83036 HEMOGLOBIN GLYCOSYLATED A1C: CPT | Performed by: PHYSICIAN ASSISTANT

## 2022-09-14 PROCEDURE — 85025 COMPLETE CBC W/AUTO DIFF WBC: CPT

## 2022-09-14 RX ORDER — DIPHENOXYLATE HYDROCHLORIDE AND ATROPINE SULFATE 2.5; .025 MG/1; MG/1
1 TABLET ORAL DAILY
COMMUNITY

## 2022-09-14 RX ORDER — FLUOXETINE HYDROCHLORIDE 40 MG/1
40 CAPSULE ORAL DAILY
COMMUNITY
Start: 2022-09-01

## 2022-09-14 NOTE — PROGRESS NOTES
[facility]    NAME: Claudell Spurling  AGE: 49 y o  SEX: female  : 1975     DATE: 10/9/2022     Assessment and Plan:     Problem List Items Addressed This Visit        Other    Anxiety     Well controlled  Continue with Psychiatry  Currently on Seroquel and Prozac  Relevant Orders    CBC and differential (Completed)    TSH, 3rd generation with Free T4 reflex (Completed)    Vitamin D deficiency     Currently on 6000 units daily  Recheck level with labs as ordered  Relevant Orders    Vitamin D 25 hydroxy (Completed)    Family history of breast cancer in female     Patient will continue regular annual mammograms  Obesity (BMI 30 0-34  9)     BMI Counseling: Body mass index is 31 09 kg/m²  The BMI is above normal  Nutrition recommendations include 3-5 servings of fruits/vegetables daily  Exercise recommendations include exercising 3-5 times per week             Relevant Orders    CBC and differential (Completed)    Comprehensive metabolic panel (Completed)    Lipid Panel with Direct LDL reflex (Completed)    TSH, 3rd generation with Free T4 reflex (Completed)    Vitamin D 25 hydroxy (Completed)    Hemoglobin A1C (Completed)      Other Visit Diagnoses     Annual physical exam    -  Primary    Relevant Orders    CBC and differential (Completed)    Comprehensive metabolic panel (Completed)    Lipid Panel with Direct LDL reflex (Completed)    TSH, 3rd generation with Free T4 reflex (Completed)    Vitamin D 25 hydroxy (Completed)    Hemoglobin A1C (Completed)    Encounter for immunization        Relevant Orders    influenza vaccine, quadrivalent, 0 5 mL, preservative-free, for adult and pediatric patients 6 mos+ (AFLURIA, FLUARIX, FLULAVAL, FLUZONE) (Completed)    At high risk for adverse medication event        Relevant Orders    Lipid Panel with Direct LDL reflex (Completed)    Hemoglobin A1C (Completed) Diabetes mellitus screening        Relevant Orders    Comprehensive metabolic panel (Completed)    Lipid screening        Relevant Orders    Lipid Panel with Direct LDL reflex (Completed)          Immunizations and preventive care screenings were discussed with patient today  Appropriate education was printed on patient's after visit summary  Counseling:  · Exercise: the importance of regular exercise/physical activity was discussed  Recommend exercise 3-5 times per week for at least 30 minutes  Return in about 1 year (around 2023) for Annual physical      Chief Complaint:     Chief Complaint   Patient presents with   • Physical Exam     Transfer from Dr Red Litten      History of Present Illness:     Adult Annual Physical   Patient here for a comprehensive physical exam  The patient reports problems - As below  The patient reports that recently anxiety/depression level has been well-controlled  Daily medication includes Seroquel and Prozac  Care team includes a therapist/psychiatrist   The patient denies SI/HI  Today's PHQ9 score was 0  The patient has a history of vitamin-D deficiency and is currently taking 6000 units BID  Last vitamin-D level was 43 4 last year  Diet and Physical Activity  · Diet/Nutrition: well balanced diet and consuming 3-5 servings of fruits/vegetables daily     · Exercise: daily riding bikes, walking trails, treadmill, etc        Depression Screening  PHQ-2/9 Depression Screening    Little interest or pleasure in doing things: 0 - not at all  Feeling down, depressed, or hopeless: 0 - not at all  Trouble falling or staying asleep, or sleeping too much: 0 - not at all  Feeling tired or having little energy: 0 - not at all  Poor appetite or overeatin - not at all  Feeling bad about yourself - or that you are a failure or have let yourself or your family down: 0 - not at all  Trouble concentrating on things, such as reading the newspaper or watching television: 0 - not at all  Moving or speaking so slowly that other people could have noticed  Or the opposite - being so fidgety or restless that you have been moving around a lot more than usual: 0 - not at all  Thoughts that you would be better off dead, or of hurting yourself in some way: 0 - not at all  PHQ-2 Score: 0  PHQ-2 Interpretation: Negative depression screen  PHQ-9 Score: 0   PHQ-9 Interpretation: No or Minimal depression        General Health  · Sleep: sleeps well and gets 7-8 hours of sleep on average  · Hearing: normal - bilateral   · Vision: goes for regular eye exams and wears glasses  · Dental: regular dental visits  /GYN Health  · Patient is: perimenopausal  · Last menstrual period: 8/21/22       Review of Systems:     Review of Systems   Constitutional: Negative for chills and fever  HENT: Negative for congestion, rhinorrhea and sore throat  Eyes: Negative for visual disturbance  Respiratory: Negative for cough, shortness of breath and wheezing  Cardiovascular: Negative for chest pain, palpitations and leg swelling  Gastrointestinal: Negative for abdominal pain, blood in stool, constipation, diarrhea, nausea and vomiting  Endocrine: Negative for polydipsia and polyuria  Genitourinary: Negative for dysuria and frequency  Musculoskeletal: Negative for arthralgias and myalgias  Skin: Negative for rash  Neurological: Negative for dizziness, syncope and headaches  Hematological: Does not bruise/bleed easily  Psychiatric/Behavioral: Negative for dysphoric mood  The patient is nervous/anxious         Past Medical History:     Past Medical History:   Diagnosis Date   • Acute sinusitis    • Anxiety    • Dense breast tissue    • History of early menarche     AGE 14   • IBS (irritable bowel syndrome)    • OCD (obsessive compulsive disorder)    • Panic attacks    • Pilonidal cyst    • PONV (postoperative nausea and vomiting)    • Seasonal allergies    • Sinus problem    • Spontaneous     • Vitamin D deficiency       Past Surgical History:     Past Surgical History:   Procedure Laterality Date   • BREAST CYST EXCISION Right     benign   • BREAST SURGERY      EXCISION OF BREAST SINGLE LESION    • DILATION AND CURETTAGE, DIAGNOSTIC / THERAPEUTIC     • MOUTH SURGERY      WISDOM TEETH    • OTHER SURGICAL HISTORY      PILONIDAL CYST RESECTION   • SKIN BIOPSY        Social History:     Social History     Socioeconomic History   • Marital status: /Civil Union     Spouse name: None   • Number of children: None   • Years of education: None   • Highest education level: None   Occupational History   • None   Tobacco Use   • Smoking status: Never Smoker   • Smokeless tobacco: Never Used   Vaping Use   • Vaping Use: Never used   Substance and Sexual Activity   • Alcohol use: No   • Drug use: No   • Sexual activity: Yes     Partners: Male   Other Topics Concern   • None   Social History Narrative    ALWAYS USES SEAT BELT     DAILY CAFFEINE CONSUMPTION      Social Determinants of Health     Financial Resource Strain: Not on file   Food Insecurity: Not on file   Transportation Needs: Not on file   Physical Activity: Not on file   Stress: Not on file   Social Connections: Not on file   Intimate Partner Violence: Not on file   Housing Stability: Not on file      Family History:     Family History   Problem Relation Age of Onset   • Colon cancer Mother         AGE 62    • Diabetes Mother    • Gallbladder disease Mother    • Hypertension Mother    • Irritable bowel syndrome Mother    • Kidney disease Mother         RECURRENT   • Hypertension Father    • Atrial fibrillation Father    • Melanoma Brother         AGE 37   • No Known Problems Daughter    • No Known Problems Daughter    • Breast cancer Maternal Grandmother         AGE 55    • Other Maternal Grandfather         black lung   • Stomach cancer Paternal Grandmother         AGE 55    • Ovarian cancer Paternal Grandmother         AGE 55    • Coronary artery disease Paternal Grandfather         massive MI   • No Known Problems Maternal Aunt    • Pancreatic cancer Maternal Uncle         UNSPECIFIED LOACTION OF MALIGNANCY    • No Known Problems Maternal Uncle    • No Known Problems Paternal Aunt    • No Known Problems Paternal Aunt    • Coronary artery disease Paternal Uncle         MI   • No Known Problems Paternal Uncle    • No Known Problems Paternal Uncle       Current Medications:     Current Outpatient Medications   Medication Sig Dispense Refill   • cetirizine (ZyrTEC) 10 mg tablet Take 10 mg by mouth daily     • FLUoxetine (PROzac) 40 MG capsule Take 40 mg by mouth daily     • fluticasone (FLONASE) 50 mcg/act nasal spray 1 spray into each nostril as needed for rhinitis     • multivitamin (THERAGRAN) TABS Take 1 tablet by mouth daily     • QUEtiapine (SEROquel XR) 50 mg Take 50 mg by mouth daily at bedtime     • Cholecalciferol (Vitamin D3) 125 MCG (5000 UT) TABS Take 6,000 Units by mouth daily at bedtime       No current facility-administered medications for this visit  Allergies: Allergies   Allergen Reactions   • Ibuprofen Hives   • Quetiapine Hives     [facility] - [id number]: May have extended release Seraquil   • Sulfa Antibiotics Hives and Rash   • Sulfamethoxazole-Trimethoprim Hives and Rash      Physical Exam:     /80 (BP Location: Left arm, Patient Position: Sitting, Cuff Size: Adult)   Pulse 82   Ht 5' 2" (1 575 m)   Wt 77 1 kg (170 lb)   LMP 08/23/2022   SpO2 99%   BMI 31 09 kg/m²     Physical Exam  Vitals reviewed  Constitutional:       General: She is not in acute distress  Appearance: Normal appearance  She is well-developed  She is obese  She is not ill-appearing  HENT:      Head: Normocephalic and atraumatic        Right Ear: Tympanic membrane, ear canal and external ear normal       Left Ear: Tympanic membrane, ear canal and external ear normal       Nose: Nose normal  Mouth/Throat:      Mouth: Mucous membranes are moist       Pharynx: No oropharyngeal exudate  Eyes:      Conjunctiva/sclera: Conjunctivae normal       Pupils: Pupils are equal, round, and reactive to light  Neck:      Thyroid: No thyromegaly  Vascular: No carotid bruit  Cardiovascular:      Rate and Rhythm: Normal rate and regular rhythm  Pulses: Normal pulses  Heart sounds: Normal heart sounds  No murmur heard  Pulmonary:      Effort: Pulmonary effort is normal       Breath sounds: Normal breath sounds  No wheezing, rhonchi or rales  Abdominal:      General: Bowel sounds are normal  There is no distension  Palpations: Abdomen is soft  There is no mass  Tenderness: There is no abdominal tenderness  Musculoskeletal:      Cervical back: Normal range of motion and neck supple  Right lower leg: No edema  Left lower leg: No edema  Lymphadenopathy:      Cervical: No cervical adenopathy  Skin:     General: Skin is warm and dry  Findings: No rash  Neurological:      Mental Status: She is alert  Sensory: No sensory deficit  Comments: 5/5 strength in UE and LE   Psychiatric:         Mood and Affect: Mood normal          Behavior: Behavior normal          Thought Content:  Thought content normal          Judgment: Judgment normal           Michelle Brambila PA-C  [facility] PRIMARY [facility]

## 2022-09-14 NOTE — PATIENT INSTRUCTIONS

## 2022-09-15 LAB
EST. AVERAGE GLUCOSE BLD GHB EST-MCNC: 100 MG/DL
HBA1C MFR BLD: 5.1 %

## 2022-09-19 ENCOUNTER — ANNUAL EXAM (OUTPATIENT)
Dept: OBGYN CLINIC | Facility: CLINIC | Age: 47
End: 2022-09-19
Payer: COMMERCIAL

## 2022-09-19 VITALS
BODY MASS INDEX: 31.47 KG/M2 | SYSTOLIC BLOOD PRESSURE: 136 MMHG | HEIGHT: 62 IN | WEIGHT: 171 LBS | DIASTOLIC BLOOD PRESSURE: 90 MMHG

## 2022-09-19 DIAGNOSIS — Z12.31 ENCOUNTER FOR SCREENING MAMMOGRAM FOR MALIGNANT NEOPLASM OF BREAST: ICD-10-CM

## 2022-09-19 DIAGNOSIS — Z01.419 ENCNTR FOR GYN EXAM (GENERAL) (ROUTINE) W/O ABN FINDINGS: Primary | ICD-10-CM

## 2022-09-19 DIAGNOSIS — Z01.419 PAP SMEAR, AS PART OF ROUTINE GYNECOLOGICAL EXAMINATION: ICD-10-CM

## 2022-09-19 PROCEDURE — G0145 SCR C/V CYTO,THINLAYER,RESCR: HCPCS | Performed by: OBSTETRICS & GYNECOLOGY

## 2022-09-19 PROCEDURE — S0612 ANNUAL GYNECOLOGICAL EXAMINA: HCPCS | Performed by: OBSTETRICS & GYNECOLOGY

## 2022-09-19 NOTE — PROGRESS NOTES
Assessment/Plan:    No problem-specific Assessment & Plan notes found for this encounter  Diagnoses and all orders for this visit:    Encntr for gyn exam (general) (routine) w/o abn findings  -     Liquid-based pap, screening    Pap smear, as part of routine gynecological examination  -     Liquid-based pap, screening    Encounter for screening mammogram for malignant neoplasm of breast  -     Mammo screening bilateral w 3d & cad; Future          Normal gynecological physical examination  Self-breast examination stressed  Mammogram ordered  Discussed regular exercise, healthy diet, importance of vitamin D and calcium supplements  Discussed importance of sun block use during periods of prolonged sun exposure  Patient will be seen in 1 year for routine gynecologic and medical examination  Patient will call office for any problems, concerns, or issues which may arise during the interim  Subjective:          HPI    Patient ID: Renetta Romero is a 49 y o  female who presents today for her annual gynecologic and medical examination    Menstrual status: Patient reports irregular periods ranging from 1-3 months apart due to perimenopause  Vasomotor symptoms: Patient reports no vasomotor symptoms     Patient reports normal appetite    Patient reports normal bowel and bladder habits    Patient denies any significant pelvic or abdominal pain    Patient denies any headaches, chest pain, shortness of breath fever shakes or chills    Patient denies any COVID 19 symptoms including cough or loss of taste or smell    COVID vaccine status: Patient is up to date on vaccinations     Medical problems:  Patient is not being followed for any significant medical problems at this time    Colonoscopy status: Patient is up to date on screening colonoscopies     Mammogram status: Patient is up to date on yearly screening mammograms  The importance of monthly self breast exams was stressed during today's visit       The following portions of the patient's history were reviewed and updated as appropriate: allergies, current medications, past family history, past medical history, past social history, past surgical history and problem list     Review of Systems   Constitutional: Negative  Negative for appetite change, diaphoresis, fatigue, fever and unexpected weight change  HENT: Negative  Eyes: Negative  Respiratory: Negative  Cardiovascular: Negative  Gastrointestinal: Negative  Negative for abdominal pain, blood in stool, constipation, diarrhea, nausea and vomiting  Endocrine: Negative  Negative for cold intolerance and heat intolerance  Genitourinary: Negative  Negative for dysuria, frequency, hematuria, urgency, vaginal bleeding, vaginal discharge and vaginal pain  Musculoskeletal: Negative  Skin: Negative  Allergic/Immunologic: Negative  Neurological: Negative  Hematological: Negative  Negative for adenopathy  Psychiatric/Behavioral: Negative  Objective:      /90   Ht 5' 2" (1 575 m)   Wt 77 6 kg (171 lb)   LMP 08/23/2022   BMI 31 28 kg/m²          Physical Exam  Constitutional:       General: She is not in acute distress  Appearance: Normal appearance  She is well-developed  She is not diaphoretic  HENT:      Head: Normocephalic and atraumatic  Eyes:      Pupils: Pupils are equal, round, and reactive to light  Cardiovascular:      Rate and Rhythm: Normal rate and regular rhythm  Heart sounds: Normal heart sounds  No murmur heard  No friction rub  No gallop  Pulmonary:      Effort: Pulmonary effort is normal       Breath sounds: Normal breath sounds  Chest:   Breasts: Breasts are symmetrical       Right: No inverted nipple, mass, nipple discharge, skin change, tenderness or supraclavicular adenopathy  Left: No inverted nipple, mass, nipple discharge, skin change, tenderness or supraclavicular adenopathy         Abdominal:      General: Bowel sounds are normal       Palpations: Abdomen is soft  Genitourinary:     General: Normal vulva  Exam position: Supine  Labia:         Right: No rash or lesion  Left: No rash or lesion  Vagina: Normal  No vaginal discharge, erythema, tenderness or bleeding  Cervix: No discharge or friability  Uterus: Not enlarged and not tender  Adnexa:         Right: No mass, tenderness or fullness  Left: No mass, tenderness or fullness  Rectum: Normal  Guaiac result negative  Musculoskeletal:         General: Normal range of motion  Cervical back: Normal range of motion and neck supple  Lymphadenopathy:      Cervical: No cervical adenopathy  Upper Body:      Right upper body: No supraclavicular adenopathy  Left upper body: No supraclavicular adenopathy  Skin:     General: Skin is warm and dry  Findings: No rash  Neurological:      General: No focal deficit present  Mental Status: She is alert and oriented to person, place, and time  Psychiatric:         Mood and Affect: Mood normal          Speech: Speech normal          Behavior: Behavior normal          Thought Content:  Thought content normal          Judgment: Judgment normal

## 2022-09-26 LAB
LAB AP GYN PRIMARY INTERPRETATION: NORMAL
LAB AP LMP: NORMAL
Lab: NORMAL

## 2022-10-09 PROBLEM — E66.811 OBESITY (BMI 30.0-34.9): Status: ACTIVE | Noted: 2018-08-01

## 2022-10-09 PROBLEM — E66.9 OBESITY (BMI 30.0-34.9): Status: ACTIVE | Noted: 2018-08-01

## 2022-10-09 NOTE — ASSESSMENT & PLAN NOTE
BMI Counseling: Body mass index is 31 09 kg/m²  The BMI is above normal  Nutrition recommendations include 3-5 servings of fruits/vegetables daily  Exercise recommendations include exercising 3-5 times per week

## 2022-11-21 ENCOUNTER — OFFICE VISIT (OUTPATIENT)
Dept: SURGICAL ONCOLOGY | Facility: CLINIC | Age: 47
End: 2022-11-21

## 2022-11-21 VITALS
TEMPERATURE: 98.8 F | HEIGHT: 62 IN | SYSTOLIC BLOOD PRESSURE: 128 MMHG | DIASTOLIC BLOOD PRESSURE: 80 MMHG | HEART RATE: 75 BPM | OXYGEN SATURATION: 99 % | BODY MASS INDEX: 31.47 KG/M2 | WEIGHT: 171 LBS | RESPIRATION RATE: 16 BRPM

## 2022-11-21 DIAGNOSIS — Z80.3 FAMILY HISTORY OF BREAST CANCER IN FEMALE: ICD-10-CM

## 2022-11-21 DIAGNOSIS — Z12.39 BREAST CANCER SCREENING OTHER THAN MAMMOGRAM: ICD-10-CM

## 2022-11-21 DIAGNOSIS — R92.2 DENSE BREAST TISSUE: Primary | ICD-10-CM

## 2022-11-21 DIAGNOSIS — Z12.31 SCREENING MAMMOGRAM, ENCOUNTER FOR: ICD-10-CM

## 2022-11-21 DIAGNOSIS — R92.8 ABNORMAL FINDING ON BREAST IMAGING: ICD-10-CM

## 2022-11-21 DIAGNOSIS — Z13.71 BRCA NEGATIVE: ICD-10-CM

## 2022-11-21 PROBLEM — N63.10 LUMP OF RIGHT BREAST: Status: RESOLVED | Noted: 2022-02-07 | Resolved: 2022-11-21

## 2022-11-21 PROBLEM — D24.9 FIBROADENOMA OF BREAST: Status: ACTIVE | Noted: 2022-11-21

## 2022-11-21 PROBLEM — D24.9 FIBROADENOMA OF BREAST: Status: RESOLVED | Noted: 2022-11-21 | Resolved: 2022-11-21

## 2022-11-21 NOTE — PROGRESS NOTES
Surgical Oncology Follow Up       [facility] SURGICAL ONCOLOGY [facility]  [facility] [id number]    Hanane Arreaga  1975  [id number]  [facility] SURGICAL ONCOLOGY [facility] [id number]    Chief Complaint   Patient presents with   • Follow-up       Assessment/Plan   Diagnoses and all orders for this visit:    Dense breast tissue  -     US breast screening bilateral complete (ABUS); Future    Family history of breast cancer in female    Breast cancer screening other than mammogram  -     US breast screening bilateral complete (ABUS); Future    Screening mammogram, encounter for  -     Mammo screening bilateral w 3d & cad; Future    BRCA negative    Abnormal finding on breast imaging        Advance Care Planning/Advance Directives:  Did not discuss  with the patient  Oncology History:    Oncology History    No history exists  History of Present Illness: Follow-up visit secondary to dense breast tissue, family history of cancer  -Interval History:  Benign imaging    Review of Systems:  Review of Systems   Constitutional: Negative  Negative for appetite change and fever  Eyes: Negative  Respiratory: Negative for shortness of breath  Cardiovascular: Negative  Gastrointestinal: Negative  Endocrine: Negative  Genitourinary: Negative  Musculoskeletal: Negative  Negative for arthralgias and myalgias  Skin: Negative  Allergic/Immunologic: Negative  Neurological: Negative  Hematological: Negative  Negative for adenopathy  Does not bruise/bleed easily  Psychiatric/Behavioral: Negative          Patient Active Problem List   Diagnosis   • Anxiety   • Dense breast tissue   • IBS (irritable bowel syndrome)   • OCD (obsessive compulsive disorder)   • Panic attacks   • Seasonal allergies   • Vitamin D deficiency   • Family history of breast cancer in female   • Screening mammogram, encounter for   • Breast cancer screening other than mammogram   • Obesity (BMI 30 0-34  9)   • Multiple nevi     Past Medical History:   Diagnosis Date   • Acute sinusitis    • Anxiety    • Dense breast tissue    • History of early menarche     AGE 14   • IBS (irritable bowel syndrome)    • OCD (obsessive compulsive disorder)    • Panic attacks    • Pilonidal cyst    • PONV (postoperative nausea and vomiting)    • Seasonal allergies    • Sinus problem    • Spontaneous     • Vitamin D deficiency      Past Surgical History:   Procedure Laterality Date   • BREAST CYST EXCISION Right     benign   • BREAST SURGERY      EXCISION OF BREAST SINGLE LESION    • DILATION AND CURETTAGE, DIAGNOSTIC / THERAPEUTIC     • MOUTH SURGERY      WISDOM TEETH    • OTHER SURGICAL HISTORY      PILONIDAL CYST RESECTION   • SKIN BIOPSY       Family History   Problem Relation Age of Onset   • Colon cancer Mother         AGE 62    • Diabetes Mother    • Gallbladder disease Mother    • Hypertension Mother    • Irritable bowel syndrome Mother    • Kidney disease Mother         RECURRENT   • Hypertension Father    • Atrial fibrillation Father    • Melanoma Brother         AGE 37   • No Known Problems Daughter    • No Known Problems Daughter    • Breast cancer Maternal Grandmother         AGE 55    • Other Maternal Grandfather         black lung   • Stomach cancer Paternal Grandmother         AGE 55    • Ovarian cancer Paternal Grandmother         AGE 55    • Coronary artery disease Paternal Grandfather         massive MI   • No Known Problems Maternal Aunt    • Pancreatic cancer Maternal Uncle         UNSPECIFIED LOACTION OF MALIGNANCY    • No Known Problems Maternal Uncle    • No Known Problems Paternal Aunt    • No Known Problems Paternal Aunt    • Coronary artery disease Paternal Uncle         MI   • No Known Problems Paternal Uncle    • No Known Problems Paternal Uncle      Social History Socioeconomic History   • Marital status: /Civil Union     Spouse name: Not on file   • Number of children: Not on file   • Years of education: Not on file   • Highest education level: Not on file   Occupational History   • Not on file   Tobacco Use   • Smoking status: Never   • Smokeless tobacco: Never   Vaping Use   • Vaping Use: Never used   Substance and Sexual Activity   • Alcohol use: No   • Drug use: No   • Sexual activity: Yes     Partners: Male   Other Topics Concern   • Not on file   Social History Narrative    ALWAYS USES SEAT BELT     DAILY CAFFEINE CONSUMPTION      Social Determinants of Health     Financial Resource Strain: Not on file   Food Insecurity: Not on file   Transportation Needs: Not on file   Physical Activity: Not on file   Stress: Not on file   Social Connections: Not on file   Intimate Partner Violence: Not on file   Housing Stability: Not on file       Current Outpatient Medications:   •  cetirizine (ZyrTEC) 10 mg tablet, Take 10 mg by mouth daily, Disp: , Rfl:   •  Cholecalciferol (Vitamin D3) 125 MCG (5000 UT) TABS, Take 6,000 Units by mouth daily at bedtime, Disp: , Rfl:   •  FLUoxetine (PROzac) 20 MG tablet, Take 20 mg by mouth daily, Disp: , Rfl:   •  fluticasone (FLONASE) 50 mcg/act nasal spray, 1 spray into each nostril as needed for rhinitis, Disp: , Rfl:   •  multivitamin (THERAGRAN) TABS, Take 1 tablet by mouth daily, Disp: , Rfl:   •  QUEtiapine (SEROquel XR) 50 mg, Take 50 mg by mouth daily at bedtime, Disp: , Rfl:   Allergies   Allergen Reactions   • Ibuprofen Hives   • Quetiapine Hives     [place] [id number]: May have extended release Seraquil   • Sulfa Antibiotics Hives and Rash   • Sulfamethoxazole-Trimethoprim Hives and Rash       The following portions of the patient's history were reviewed and updated as appropriate: allergies, current medications, past family history, past medical history, past social history, past surgical history and problem list         Vitals:    11/21/22 1003   BP: 128/80   Pulse: 75   Resp: 16   Temp: 98 8 °F (37 1 °C)   SpO2: 99%       Physical Exam  Constitutional:       General: She is not in acute distress  Appearance: Normal appearance  She is well-developed and well-nourished  HENT:      Head: Normocephalic and atraumatic  Chest:   Breasts:     Right: No inverted nipple, mass, nipple discharge, skin change or tenderness  Left: No inverted nipple, mass, nipple discharge, skin change or tenderness  Lymphadenopathy:      Upper Body:      Right upper body: No supraclavicular, axillary, pectoral or lateral adenopathy  Left upper body: No supraclavicular, axillary, pectoral or lateral adenopathy  Neurological:      Mental Status: She is alert and oriented to person, place, and time  Psychiatric:         Mood and Affect: Mood and affect and mood normal            Results:  Labs:  Could not locate genetic testing results in media but did see reference of the [employer] in the genetics note which was negative    Imaging  01/24/2022 automated breast ultrasound was a BI-RADS 0 secondary to potential mass in the left breast    02/01/2022 left breast ultrasound states that the shadowing mass was likely artifact the known fibroadenoma was appreciated and stable in the 0200 hours axis, benign study    08/01/2022 bilateral 3D screening mammogram was benign BI-RADS two with a density of four    I reviewed the above laboratory and imaging data  Discussion/Summary:  70-year-old female with dense breast tissue and family history of breast cancer  She also has a history of a benign fibroadenoma  Her prior genetic testing was benign  There are no concerns on exam today  She was called back after her automated breast ultrasound for a diagnostic ultrasound which was benign  Her mammogram was benign as well  I will make arrangements for both imaging modalities again for her for next year    I will see her again in one year or sooner should the need arise

## 2022-12-16 ENCOUNTER — TELEPHONE (OUTPATIENT)
Dept: HEMATOLOGY ONCOLOGY | Facility: CLINIC | Age: 47
End: 2022-12-16

## 2022-12-16 ENCOUNTER — OFFICE VISIT (OUTPATIENT)
Dept: SURGICAL ONCOLOGY | Facility: CLINIC | Age: 47
End: 2022-12-16

## 2022-12-16 VITALS
BODY MASS INDEX: 32.57 KG/M2 | HEART RATE: 84 BPM | HEIGHT: 62 IN | SYSTOLIC BLOOD PRESSURE: 142 MMHG | OXYGEN SATURATION: 98 % | WEIGHT: 177 LBS | DIASTOLIC BLOOD PRESSURE: 78 MMHG | TEMPERATURE: 98.1 F

## 2022-12-16 DIAGNOSIS — N63.21 MASS OF UPPER OUTER QUADRANT OF LEFT BREAST: Primary | ICD-10-CM

## 2022-12-16 PROBLEM — N63.20 LEFT BREAST LUMP: Status: ACTIVE | Noted: 2022-12-16

## 2022-12-16 NOTE — PROGRESS NOTES
Surgical Oncology Follow Up       [facility] SURGICAL ONCOLOGY [facility] [address]    Lehr Stagers  1975  [id number]  [facility] SURGICAL ONCOLOGY [facility]  [address] [id number]    Chief Complaint   Patient presents with   • Follow-up     Pt presents for an lump of left breast  Pt reports not having any pain  Assessment/Plan:  1  Mass of upper outer quadrant of left breast  - benign  - PRN       Discussion/Summary: Pt is a 49year old female presenting today for a left breast lump  Patient called today stating that she appreciated a mass on the left breast  During her visit she said she was doing a breast exam this morning and noticed an area of raised skin that appeared to have a mass below it  She denied trauma to the breast or a skin rash  There were no concerns on her clinical breast exam  I ensured the patient that only appreciated benign breast tissue on exam  Patient denied adenopathy, skin changes, nipple changes or discharge  I instructed her to call with questions or concerns prior to her next follow up visit  All questions were answered today  History of Present Illness:     Oncology History    No history exists         -Interval History: Pt is a 49year old female presenting today for a left breast lump  Patient called today stating that she appreciated a mass on the left breast  She denies further changes on her breast exam        Review of Systems:  Review of Systems   Constitutional: Negative for activity change, appetite change, fatigue and unexpected weight change  Respiratory: Negative for cough and shortness of breath  Cardiovascular: Negative for chest pain  Gastrointestinal: Negative for abdominal pain, diarrhea, nausea and vomiting  Endocrine: Negative for heat intolerance  Musculoskeletal: Negative for arthralgias, back pain and myalgias     Skin: Negative for rash  Neurological: Negative for weakness and headaches  Hematological: Negative for adenopathy  Patient Active Problem List   Diagnosis   • Anxiety   • Dense breast tissue   • IBS (irritable bowel syndrome)   • OCD (obsessive compulsive disorder)   • Panic attacks   • Seasonal allergies   • Vitamin D deficiency   • Family history of breast cancer in female   • Screening mammogram, encounter for   • Breast cancer screening other than mammogram   • Obesity (BMI 30 0-34  9)   • Multiple nevi   • Left breast lump     Past Medical History:   Diagnosis Date   • Acute sinusitis    • Anxiety    • Dense breast tissue    • History of early menarche     AGE 14   • IBS (irritable bowel syndrome)    • OCD (obsessive compulsive disorder)    • Panic attacks    • Pilonidal cyst    • PONV (postoperative nausea and vomiting)    • Seasonal allergies    • Sinus problem    • Spontaneous     • Vitamin D deficiency      Past Surgical History:   Procedure Laterality Date   • BREAST CYST EXCISION Right     benign   • BREAST SURGERY      EXCISION OF BREAST SINGLE LESION    • DILATION AND CURETTAGE, DIAGNOSTIC / THERAPEUTIC     • MOUTH SURGERY      WISDOM TEETH    • OTHER SURGICAL HISTORY      PILONIDAL CYST RESECTION   • SKIN BIOPSY       Family History   Problem Relation Age of Onset   • Colon cancer Mother         AGE 62    • Diabetes Mother    • Gallbladder disease Mother    • Hypertension Mother    • Irritable bowel syndrome Mother    • Kidney disease Mother         RECURRENT   • Hypertension Father    • Atrial fibrillation Father    • Melanoma Brother         AGE 37   • No Known Problems Daughter    • No Known Problems Daughter    • Breast cancer Maternal Grandmother         AGE 55    • Other Maternal Grandfather         black lung   • Stomach cancer Paternal Grandmother         AGE 55    • Ovarian cancer Paternal Grandmother         AGE 55    • Coronary artery disease Paternal Grandfather massive MI   • No Known Problems Maternal Aunt    • Pancreatic cancer Maternal Uncle         UNSPECIFIED LOACTION OF MALIGNANCY    • No Known Problems Maternal Uncle    • No Known Problems Paternal Aunt    • No Known Problems Paternal Aunt    • Coronary artery disease Paternal Uncle         MI   • No Known Problems Paternal Uncle    • No Known Problems Paternal Uncle      Social History     Socioeconomic History   • Marital status: /Civil Union     Spouse name: Not on file   • Number of children: Not on file   • Years of education: Not on file   • Highest education level: Not on file   Occupational History   • Not on file   Tobacco Use   • Smoking status: Never   • Smokeless tobacco: Never   Vaping Use   • Vaping Use: Never used   Substance and Sexual Activity   • Alcohol use: No   • Drug use: No   • Sexual activity: Yes     Partners: Male   Other Topics Concern   • Not on file   Social History Narrative    ALWAYS USES SEAT BELT     DAILY CAFFEINE CONSUMPTION      Social Determinants of Health     Financial Resource Strain: Not on file   Food Insecurity: Not on file   Transportation Needs: Not on file   Physical Activity: Not on file   Stress: Not on file   Social Connections: Not on file   Intimate Partner Violence: Not on file   Housing Stability: Not on file       Current Outpatient Medications:   •  cetirizine (ZyrTEC) 10 mg tablet, Take 10 mg by mouth daily, Disp: , Rfl:   •  Cholecalciferol (Vitamin D3) 125 MCG (5000 UT) TABS, Take 6,000 Units by mouth daily at bedtime, Disp: , Rfl:   •  FLUoxetine (PROzac) 20 MG tablet, Take 20 mg by mouth daily, Disp: , Rfl:   •  fluticasone (FLONASE) 50 mcg/act nasal spray, 1 spray into each nostril as needed for rhinitis, Disp: , Rfl:   •  multivitamin (THERAGRAN) TABS, Take 1 tablet by mouth daily, Disp: , Rfl:   •  QUEtiapine (SEROquel XR) 50 mg, Take 50 mg by mouth daily at bedtime, Disp: , Rfl:   Allergies   Allergen Reactions   • Ibuprofen Hives   • Quetiapine Hives     [facility] - [id number]: May have extended release Seraquil   • Sulfa Antibiotics Hives and Rash   • Sulfamethoxazole-Trimethoprim Hives and Rash     Vitals:    12/16/22 1322   BP: 142/78   Pulse: 84   Temp: 98 1 °F (36 7 °C)   SpO2: 98%       Physical Exam  Constitutional:       General: She is not in acute distress  Appearance: Normal appearance  Cardiovascular:      Rate and Rhythm: Normal rate and regular rhythm  Pulses: Normal pulses  Heart sounds: Normal heart sounds  Pulmonary:      Effort: Pulmonary effort is normal       Breath sounds: Normal breath sounds  Chest:      Chest wall: No mass  Breasts:     Right: No swelling, bleeding, inverted nipple, mass, nipple discharge, skin change or tenderness  Left: No swelling, bleeding, inverted nipple, mass, nipple discharge, skin change or tenderness  Comments: No masses, nodularity, skin changes, nipple changes or discharge, or adenopathy appreciated on physical exam      Abdominal:      General: Abdomen is flat  Palpations: Abdomen is soft  Lymphadenopathy:      Upper Body:      Right upper body: No supraclavicular, axillary or pectoral adenopathy  Left upper body: No supraclavicular, axillary or pectoral adenopathy  Skin:     General: Skin is warm  Neurological:      General: No focal deficit present  Mental Status: She is alert and oriented to person, place, and time  Psychiatric:         Mood and Affect: Mood normal          Behavior: Behavior normal            Results:    Imaging  No results found  I reviewed the above imaging data  Advance Care Planning/Advance Directives:  Discussed disease status, cancer treatment plans and/or cancer treatment goals with the patient

## 2022-12-16 NOTE — TELEPHONE ENCOUNTER
APPOINTMENT REQUEST REVIEW   Patient Details    NAME        Reason For Appointment      Who is Calling to schedule? Patient      Who is the referring doctor? Which doctor would they like to schedule with? Dr Low      [facility]      Reason they are requesting appointment? Patient has found a new lump in her left breast    I will send this information to Dr Lino Herrera ______’s team to have them review  Someone from his/her team will return a call to you within 24 hours to discuss next steps with you and if you are in need of an appointment, they will schedule this for you at that time  Did you relay this information to the patient?  Yes , patient can be reached back at [phone]

## 2023-01-24 ENCOUNTER — HOSPITAL ENCOUNTER (OUTPATIENT)
Dept: ULTRASOUND IMAGING | Facility: CLINIC | Age: 48
Discharge: HOME | End: 2023-01-24

## 2023-01-24 DIAGNOSIS — Z12.39 BREAST CANCER SCREENING OTHER THAN MAMMOGRAM: ICD-10-CM

## 2023-01-24 DIAGNOSIS — R92.2 DENSE BREAST TISSUE: ICD-10-CM

## 2023-01-30 ENCOUNTER — HOSPITAL ENCOUNTER (EMERGENCY)
Facility: HOSPITAL | Age: 48
Discharge: HOME | End: 2023-01-31
Attending: EMERGENCY MEDICINE

## 2023-01-30 DIAGNOSIS — K62.5 RECTAL BLEEDING: Primary | ICD-10-CM

## 2023-01-30 DIAGNOSIS — K64.4 EXTERNAL HEMORRHOID: ICD-10-CM

## 2023-01-31 VITALS
TEMPERATURE: 99.5 F | WEIGHT: 174.16 LBS | BODY MASS INDEX: 32.05 KG/M2 | OXYGEN SATURATION: 100 % | SYSTOLIC BLOOD PRESSURE: 142 MMHG | HEART RATE: 83 BPM | DIASTOLIC BLOOD PRESSURE: 72 MMHG | RESPIRATION RATE: 18 BRPM | HEIGHT: 62 IN

## 2023-01-31 NOTE — DISCHARGE INSTRUCTIONS
Follow up with your PCP and GI as discussed    Return for heavier bleeding, abdominal pain, rectal pain, fever, chills, difficulty with urination, incontinence, numbness in the groin, weakness, or any other new or concerning symptoms

## 2023-02-01 NOTE — ED PROVIDER NOTES
History  Chief Complaint   Patient presents with   • Rectal Bleeding     Pt reports rectal bleeding tonight when she had b m    +mid back pain that started last week      The patient is a 50-year-old female with history of IBS, anxiety who presents to the ED for evaluation after an episode of bright red blood per rectum  The patient reports that earlier today, she had a large bowel movement  Tonight, she had another bowel movement, and noted that she had a small amount of bright red blood on wiping  She reports that the toilet was not full of blood, and the bowel movement did not consist mostly of blood  She did not have any additional bleeding following  She also reports having mid lower back pain for the last week  She reports that this pain improves when she stretches, and has improved over the past week  She otherwise denies fever, chills, hematuria, dysuria, melena, hematemesis, nausea, vomiting, abdominal pain, saddle anesthesia, numbness, paresthesia, urinary retention/incontinence, bowel incontinence, focal weakness  Patient has had last colonoscopy previously in 2019, was unremarkable other than hemorrhoids  Prior to Admission Medications   Prescriptions Last Dose Informant Patient Reported? Taking?    Cholecalciferol (Vitamin D3) 125 MCG (5000 UT) TABS   Yes No   Sig: Take 6,000 Units by mouth daily at bedtime   FLUoxetine (PROzac) 20 MG tablet   Yes No   Sig: Take 20 mg by mouth daily   QUEtiapine (SEROquel XR) 50 mg   Yes No   Sig: Take 50 mg by mouth daily at bedtime   cetirizine (ZyrTEC) 10 mg tablet   Yes No   Sig: Take 10 mg by mouth daily   fluticasone (FLONASE) 50 mcg/act nasal spray   Yes No   Si spray into each nostril as needed for rhinitis   multivitamin (THERAGRAN) TABS   Yes No   Sig: Take 1 tablet by mouth daily      Facility-Administered Medications: None       Past Medical History:   Diagnosis Date   • Acute sinusitis    • Anxiety    • Dense breast tissue    • History of early menarche     AGE 14   • IBS (irritable bowel syndrome)    • OCD (obsessive compulsive disorder)    • Panic attacks    • Pilonidal cyst    • PONV (postoperative nausea and vomiting)    • Seasonal allergies    • Sinus problem    • Spontaneous     • Vitamin D deficiency        Past Surgical History:   Procedure Laterality Date   • BREAST CYST EXCISION Right     benign   • BREAST SURGERY      EXCISION OF BREAST SINGLE LESION    • DILATION AND CURETTAGE, DIAGNOSTIC / THERAPEUTIC     • MOUTH SURGERY      WISDOM TEETH    • OTHER SURGICAL HISTORY      PILONIDAL CYST RESECTION   • SKIN BIOPSY         Family History   Problem Relation Age of Onset   • Colon cancer Mother         AGE 62    • Diabetes Mother    • Gallbladder disease Mother    • Hypertension Mother    • Irritable bowel syndrome Mother    • Kidney disease Mother         RECURRENT   • Hypertension Father    • Atrial fibrillation Father    • Melanoma Brother         AGE 37   • No Known Problems Daughter    • No Known Problems Daughter    • Breast cancer Maternal Grandmother         AGE 55    • Other Maternal Grandfather         black lung   • Stomach cancer Paternal Grandmother         AGE 55    • Ovarian cancer Paternal Grandmother         AGE 55    • Coronary artery disease Paternal Grandfather         massive MI   • No Known Problems Maternal Aunt    • Pancreatic cancer Maternal Uncle         UNSPECIFIED LOACTION OF MALIGNANCY    • No Known Problems Maternal Uncle    • No Known Problems Paternal Aunt    • No Known Problems Paternal Aunt    • Coronary artery disease Paternal Uncle         MI   • No Known Problems Paternal Uncle    • No Known Problems Paternal Uncle      I have reviewed and agree with the history as documented      E-Cigarette/Vaping   • E-Cigarette Use Never User      E-Cigarette/Vaping Substances   • Nicotine No    • THC No    • CBD No    • Flavoring No    • Other No    • Unknown No      Social History Tobacco Use   • Smoking status: Never   • Smokeless tobacco: Never   Vaping Use   • Vaping Use: Never used   Substance Use Topics   • Alcohol use: No   • Drug use: No       Review of Systems   Constitutional: Negative for chills and fever  HENT: Negative for congestion and rhinorrhea  Respiratory: Negative for cough and shortness of breath  Cardiovascular: Negative for chest pain and leg swelling  Gastrointestinal: Positive for anal bleeding  Negative for abdominal pain, constipation, diarrhea, nausea, rectal pain and vomiting  Genitourinary: Negative for decreased urine volume, dysuria, flank pain and hematuria  Musculoskeletal: Positive for myalgias  Negative for arthralgias and gait problem  Skin: Negative for rash and wound  Neurological: Negative for dizziness, weakness, numbness and headaches  Psychiatric/Behavioral: Negative for behavioral problems  Physical Exam  Physical Exam  Vitals and nursing note reviewed  Exam conducted with a chaperone present ([address] )  Constitutional:       General: She is not in acute distress  Appearance: She is well-developed  She is not ill-appearing or toxic-appearing  HENT:      Head: Normocephalic and atraumatic  Eyes:      Conjunctiva/sclera: Conjunctivae normal    Cardiovascular:      Rate and Rhythm: Normal rate and regular rhythm  Heart sounds: No murmur heard  Pulmonary:      Effort: Pulmonary effort is normal  No respiratory distress  Breath sounds: Normal breath sounds  Abdominal:      Palpations: Abdomen is soft  Tenderness: There is no abdominal tenderness  There is no guarding or rebound  Genitourinary:     Rectum: Guaiac result negative  External hemorrhoid present  No tenderness, anal fissure or internal hemorrhoid  Normal anal tone  Musculoskeletal:         General: No swelling  Normal range of motion  Cervical back: Neck supple  No bony tenderness        Thoracic back: No tenderness or bony tenderness  Lumbar back: No tenderness or bony tenderness  Negative right straight leg raise test and negative left straight leg raise test    Skin:     General: Skin is warm and dry  Capillary Refill: Capillary refill takes less than 2 seconds  Coloration: Skin is not pale  Neurological:      Mental Status: She is alert  Sensory: No sensory deficit  Motor: No weakness  Gait: Gait normal    Psychiatric:         Mood and Affect: Mood normal          Vital Signs  ED Triage Vitals [01/30/23 2115]   Temperature Pulse Respirations Blood Pressure SpO2   99 5 °F (37 5 °C) 87 16 160/74 100 %      Temp Source Heart Rate Source Patient Position - Orthostatic VS BP Location FiO2 (%)   Oral Monitor Sitting Right arm --      Pain Score       2           Vitals:    01/30/23 2115 01/31/23 0106   BP: 160/74 142/72   Pulse: 87 83   Patient Position - Orthostatic VS: Sitting Sitting         Visual Acuity      ED Medications  Medications - No data to display    Diagnostic Studies  Results Reviewed     None                 No orders to display              Procedures  Procedures         ED Course       SBIRT 22yo+    Flowsheet Row Most Recent Value   SBIRT (23 yo +)    In order to provide better care to our patients, we are screening all of our patients for alcohol and drug use  Would it be okay to ask you these screening questions? Yes Filed at: 01/31/2023 0051   Initial Alcohol Screen: US AUDIT-C     1  How often do you have a drink containing alcohol? 0 Filed at: 01/31/2023 0051   2  How many drinks containing alcohol do you have on a typical day you are drinking? 0 Filed at: 01/31/2023 0051   3b  FEMALE Any Age, or MALE 65+: How often do you have 4 or more drinks on one occassion? 0 Filed at: 01/31/2023 0051   Audit-C Score 0 Filed at: 01/31/2023 4467   MITCHELL: How many times in the past year have you    Used an illegal drug or used a prescription medication for non-medical reasons?  Never Filed at: 01/31/2023 0051          Medical Decision Making  The patient is a 54-year-old female presenting to the ED after episode of bright red blood per rectum  Also reports right lower back pain for the past week which has improved over time  On exam, patient is in no acute distress  Vital signs stable  Abdomen soft and nontender  Heart rate and rhythm regular, lungs clear to auscultation  Patient with no tenderness to back, spine  Strength 5/5 in bilateral lower extremities, sensation grossly intact  On rectal exam, patient with external hemorrhoid, no fissure, Hemoccult negative  Given small amount of blood, external hemorrhoid, history of hemorrhoids, as well as benign exam, will discharge patient with close PCP, GI follow-up  Patient is agreeable  At the time of discharge, the patient is in no acute distress  I discussed with the patient the diagnosis, treatment plan, follow-up, return precautions, and discharge instructions; they were given the opportunity to ask questions and verbalized understanding  External hemorrhoid: acute illness or injury  Rectal bleeding: acute illness or injury  Amount and/or Complexity of Data Reviewed  External Data Reviewed: labs, radiology and notes  Disposition  Final diagnoses:   Rectal bleeding   External hemorrhoid     Time reflects when diagnosis was documented in both MDM as applicable and the Disposition within this note     Time User Action Codes Description Comment    1/31/2023 12:57 AM Raymon Miller Add [K62 5] Rectal bleeding     1/31/2023 12:58 AM Raymon Miller Add [K64 4] External hemorrhoid       ED Disposition     ED Disposition   Discharge    Condition   Stable    Date/Time   Tue Jan 31, 2023 [address] discharge to home/self care                 Follow-up Information     Follow up With Specialties Details Why Contact Brian Sierra PA-C Family Medicine, Physician Assistant   [address] [facility]  [address] [address]  [phone]            Discharge Medication List as of 1/31/2023 12:58 AM      CONTINUE these medications which have NOT CHANGED    Details   cetirizine (ZyrTEC) 10 mg tablet Take 10 mg by mouth daily, Historical Med      Cholecalciferol (Vitamin D3) 125 MCG (5000 UT) TABS Take 6,000 Units by mouth daily at bedtime, Historical Med      FLUoxetine (PROzac) 20 MG tablet Take 20 mg by mouth daily, Starting Thu 9/1/2022, Historical Med      fluticasone (FLONASE) 50 mcg/act nasal spray 1 spray into each nostril as needed for rhinitis, Historical Med      multivitamin (THERAGRAN) TABS Take 1 tablet by mouth daily, Historical Med      QUEtiapine (SEROquel XR) 50 mg Take 50 mg by mouth daily at bedtime, Historical Med             No discharge procedures on file      PDMP Review       Value Time User    PDMP Reviewed  Yes 10/9/2022  5:55 PM Juan Ramirez PA-C          ED Provider  Electronically Signed by           Anupam Mix PA-C  02/01/23 9678

## 2023-02-02 ENCOUNTER — OFFICE VISIT (OUTPATIENT)
Dept: GASTROENTEROLOGY | Facility: MEDICAL CENTER | Age: 48
End: 2023-02-02

## 2023-02-02 VITALS
HEART RATE: 71 BPM | WEIGHT: 169.8 LBS | SYSTOLIC BLOOD PRESSURE: 132 MMHG | DIASTOLIC BLOOD PRESSURE: 73 MMHG | BODY MASS INDEX: 31.06 KG/M2 | TEMPERATURE: 97.8 F

## 2023-02-02 DIAGNOSIS — K62.5 RECTAL BLEEDING: Primary | ICD-10-CM

## 2023-02-02 RX ORDER — POLYETHYLENE GLYCOL 3350 17 G/17G
17 POWDER, FOR SOLUTION ORAL DAILY
Qty: 507 G | Refills: 0 | Status: SHIPPED | OUTPATIENT
Start: 2023-02-02

## 2023-02-02 RX ORDER — SODIUM, POTASSIUM,MAG SULFATES 17.5-3.13G
1 SOLUTION, RECONSTITUTED, ORAL ORAL ONCE
Qty: 60 ML | Refills: 0 | Status: SHIPPED | OUTPATIENT
Start: 2023-02-02 | End: 2023-02-02

## 2023-02-02 NOTE — PROGRESS NOTES
FAMILY PRACTICE ACUTE OFFICE VISIT  [facility] - [facility]       NAME: Delta Gupta  AGE: 49 y o  SEX: female       : 1975        MRN: [id number]    DATE: 2023  TIME: 8:29 PM    Assessment and Plan     Problem List Items Addressed This Visit    None  Visit Diagnoses     External hemorrhoid    -  Primary    Keep appointment as scheduled with GI for colonoscopy  Encouraged to try Miralax as recommended yesterday by GI  Have good fiber and water intake  Back pain, unspecified back location, unspecified back pain laterality, unspecified chronicity        Intermittent and brief  We reviewed that this appears consistent with mild spasms in her back musculature  Gave patient exercises to try  Call if persistent  Chief Complaint   No chief complaint on file  History of Present Illness   Delta Gupta is a 49y o -year-old female who presents for blood in the stools  Patient went to the emergency room 4 days ago on 2023  She was diagnosed with external hemorrhoids as the source of her rectal bleeding (no fissure noted and Hemoccult was negative)  She was discharged with directions to follow-up with PCP and gastroenterology  No treatment was provided, but she had an appointment with Dr Natalie Dick yesterday - has colonoscopy scheduled now and told to try Miralax  She confirms that she had strained with her stool that day - has not had any recurrence  Patient also reports intermittent brief episodes of pain in her back  She notes that they normally occur when she is putting a coat on a bed or some sort of similar activity  It resolves quickly and does not normally require any treatment to resolve  Review of Systems   Review of Systems   Constitutional: Negative for chills and fever  Gastrointestinal: Positive for blood in stool and constipation  Negative for abdominal pain, diarrhea, nausea and vomiting         Active Problem List     Patient Active Problem List   Diagnosis   • Anxiety   • Dense breast tissue   • IBS (irritable bowel syndrome)   • OCD (obsessive compulsive disorder)   • Panic attacks   • Seasonal allergies   • Vitamin D deficiency   • Family history of breast cancer in female   • Screening mammogram, encounter for   • Breast cancer screening other than mammogram   • Obesity (BMI 30 0-34  9)   • Multiple nevi   • Left breast lump         Social History:  Social History     Socioeconomic History   • Marital status: /Civil Union     Spouse name: Not on file   • Number of children: Not on file   • Years of education: Not on file   • Highest education level: Not on file   Occupational History   • Not on file   Tobacco Use   • Smoking status: Never   • Smokeless tobacco: Never   Vaping Use   • Vaping Use: Never used   Substance and Sexual Activity   • Alcohol use: No   • Drug use: No   • Sexual activity: Yes     Partners: Male   Other Topics Concern   • Not on file   Social History Narrative    ALWAYS USES SEAT BELT     DAILY CAFFEINE CONSUMPTION      Social Determinants of Health     Financial Resource Strain: Not on file   Food Insecurity: Not on file   Transportation Needs: Not on file   Physical Activity: Not on file   Stress: Not on file   Social Connections: Not on file   Intimate Partner Violence: Not on file   Housing Stability: Not on file       Objective     Vitals:    02/03/23 0800   BP: 130/80   BP Location: Right arm   Cuff Size: Large   Pulse: 87   Resp: 16   Temp: 99 °F (37 2 °C)   TempSrc: Tympanic   SpO2: 99%   Weight: 78 kg (172 lb)   Height: 5' 2" (1 575 m)     Wt Readings from Last 3 Encounters:   02/03/23 78 kg (172 lb)   02/02/23 77 kg (169 lb 12 8 oz)   01/30/23 79 kg (174 lb 2 6 oz)       Physical Exam  Vitals reviewed  Constitutional:       General: She is not in acute distress  Appearance: Normal appearance  She is obese  She is not ill-appearing     HENT:      Head: Normocephalic and atraumatic  Cardiovascular:      Rate and Rhythm: Normal rate and regular rhythm  Pulses: Normal pulses  Heart sounds: Normal heart sounds  No murmur heard  Pulmonary:      Effort: Pulmonary effort is normal       Breath sounds: No wheezing, rhonchi or rales  Abdominal:      General: Bowel sounds are normal  There is no distension  Palpations: Abdomen is soft  There is no mass  Tenderness: There is no abdominal tenderness  There is no guarding  Musculoskeletal:      Right lower leg: No edema  Left lower leg: No edema  Skin:     General: Skin is warm and dry  Findings: No rash  Neurological:      Mental Status: She is alert  Psychiatric:         Mood and Affect: Mood normal          Behavior: Behavior normal          Thought Content: Thought content normal          Judgment: Judgment normal          Pertinent Laboratory/Diagnostic Studies:  Results for orders placed or performed in visit on 09/19/22   Liquid-based pap, screening   Result Value Ref Range    Case Report       Gynecologic Cytology Report                       Case: [id number] Provider:  Lisa Chirinos MD   Collected:           09/19/2022 1040              Ordering Location:     [address] [place] [address] Received:            09/19/2022 1040                                     GYN                                                                          First Screen:          Sarah Dillon                                                               Specimen:    LIQUID-BASED PAP, SCREENING, Cervix                                                        Primary Interpretation Negative for intraepithelial lesion or malignancy     Specimen Adequacy       Satisfactory for evaluation  Endocervical/transformation zone component present      Additional Information       [employer]'s FDA approved ,  and ThinPrep Imaging Duo System are utilized with strict adherence to the 's instruction manual to prepare gynecologic and non-gynecologic cytology specimens for the production of ThinPrep slides as well as for gynecologic ThinPrep imaging  These processes have been validated by our laboratory and/or by the   The Pap test is not a diagnostic procedure and should not be used as the sole means to detect cervical cancer  It is only a screening procedure to aid in the detection of cervical cancer and its precursors  Both false-negative and false-positive results have been experienced  Your patient's test result should be interpreted in this context together with the history and clinical findings  LMP 8/23/2022        No orders of the defined types were placed in this encounter        ALLERGIES:  Allergies   Allergen Reactions   • Ibuprofen Hives   • Quetiapine Hives     [facility] - [id number]: May have extended release Seraquil   • Sulfa Antibiotics Hives and Rash   • Sulfamethoxazole-Trimethoprim Hives and Rash       Current Medications     Current Outpatient Medications   Medication Sig Dispense Refill   • cetirizine (ZyrTEC) 10 mg tablet Take 10 mg by mouth daily     • Cholecalciferol (Vitamin D3) 125 MCG (5000 UT) TABS Take 6,000 Units by mouth daily at bedtime     • FLUoxetine (PROzac) 20 MG tablet Take 20 mg by mouth daily     • fluticasone (FLONASE) 50 mcg/act nasal spray 1 spray into each nostril as needed for rhinitis     • multivitamin (THERAGRAN) TABS Take 1 tablet by mouth daily     • polyethylene glycol (GLYCOLAX) 17 GM/SCOOP powder Take 17 g by mouth daily 507 g 0   • QUEtiapine (SEROquel XR) 50 mg Take 50 mg by mouth daily at bedtime     • bisacodyl (DULCOLAX) 5 mg EC tablet Take 2 tablets (10 mg total) by mouth once for 1 dose 2 tablet 0   • Na Sulfate-K Sulfate-Mg Sulf (Suprep Bowel Prep Kit) 17 5-3 13-1 6 GM/177ML SOLN Take 1 Bottle by mouth once for 1 dose 60 mL 0   • polyethylene glycol (GOLYTELY) 4000 mL solution Take 4,000 mL by mouth once for 1 dose 4000 mL 0     No current facility-administered medications for this visit           Sena Garcia PA-C  2/4/2023 8:29 PM  [facility]

## 2023-02-02 NOTE — PROGRESS NOTES
[facility] - Outpatient Consultation  Tawanna Cm 49 y o  female MRN: [id number]  Encounter: [id number]          ASSESSMENT AND PLAN:      1  Rectal bleeding  Patient had a normal colonoscopy in 2019, with new onset of small amount of rectal bleeding in the setting of chronic constipation  It is most likely bleeding is secondary to hemorrhoids  She was counseled on importance of increased water and fiber intake  Patient was counseled on correct use of MiraLAX  Repeat colonoscopy to rule out other bleeding lesions  Patient requested Suprep  - polyethylene glycol (GLYCOLAX) 17 GM/SCOOP powder; Take 17 g by mouth daily  Dispense: 507 g; Refill: 0  - Colonoscopy; Future  - polyethylene glycol (GOLYTELY) 4000 mL solution; Take 4,000 mL by mouth once for 1 dose  Dispense: 4000 mL; Refill: 0  - bisacodyl (DULCOLAX) 5 mg EC tablet; Take 2 tablets (10 mg total) by mouth once for 1 dose  Dispense: 2 tablet; Refill: 0  - Na Sulfate-K Sulfate-Mg Sulf (Suprep Bowel Prep Kit) 17 5-3 13-1 6 GM/177ML SOLN; Take 1 Bottle by mouth once for 1 dose  Dispense: 60 mL; Refill: 0    ______________________________________________________________________    HPI:    50-year-old female with family history of colon cancer and colon polyps presented for new onset rectal bleeding  She reported she had a one-time small amount of blood on the wipe last week  Her symptoms have since resolved  She reported she has been having constipation in the past few years  She was last seen in 2019 and underwent a screening colonoscopy with normal findings  Patient has significant family history of colon cancer:her maternal uncle had colon cancer and her mother had partial colectomy and her brother at the age of 52 has findings of significant colon polyps  She denies smoking  She denies alcohol abuse  REVIEW OF SYSTEMS:    CONSTITUTIONAL: Denies any fever, chills, rigors, and weight loss    HEENT: No earache or tinnitus  Denies hearing loss or visual disturbances  CARDIOVASCULAR: No chest pain or palpitations  RESPIRATORY: Denies any cough, hemoptysis, shortness of breath or dyspnea on exertion  GASTROINTESTINAL: As noted in the History of Present Illness  GENITOURINARY: No problems with urination  Denies any hematuria or dysuria  NEUROLOGIC: No dizziness or vertigo, denies headaches  MUSCULOSKELETAL: Denies any muscle or joint pain  SKIN: Denies skin rashes or itching  ENDOCRINE: Denies excessive thirst  Denies intolerance to heat or cold  PSYCHOSOCIAL: Denies depression or anxiety  Denies any recent memory loss         Historical Information   Past Medical History:   Diagnosis Date   • Acute sinusitis    • Anxiety    • Dense breast tissue    • History of early menarche     AGE 14   • IBS (irritable bowel syndrome)    • OCD (obsessive compulsive disorder)    • Panic attacks    • Pilonidal cyst    • PONV (postoperative nausea and vomiting)    • Seasonal allergies    • Sinus problem    • Spontaneous     • Vitamin D deficiency      Past Surgical History:   Procedure Laterality Date   • BREAST CYST EXCISION Right     benign   • BREAST SURGERY      EXCISION OF BREAST SINGLE LESION    • DILATION AND CURETTAGE, DIAGNOSTIC / THERAPEUTIC     • MOUTH SURGERY      WISDOM TEETH    • OTHER SURGICAL HISTORY      PILONIDAL CYST RESECTION   • SKIN BIOPSY       Social History   Social History     Substance and Sexual Activity   Alcohol Use No     Social History     Substance and Sexual Activity   Drug Use No     Social History     Tobacco Use   Smoking Status Never   Smokeless Tobacco Never     Family History   Problem Relation Age of Onset   • Colon cancer Mother         AGE 62    • Diabetes Mother    • Gallbladder disease Mother    • Hypertension Mother    • Irritable bowel syndrome Mother    • Kidney disease Mother         RECURRENT   • Hypertension Father    • Atrial fibrillation Father    • Melanoma Brother         AGE 37   • No Known Problems Daughter    • No Known Problems Daughter    • Breast cancer Maternal Grandmother         AGE 55    • Other Maternal Grandfather         black lung   • Stomach cancer Paternal Grandmother         AGE 55    • Ovarian cancer Paternal Grandmother         AGE 55    • Coronary artery disease Paternal Grandfather         massive MI   • No Known Problems Maternal Aunt    • Pancreatic cancer Maternal Uncle         UNSPECIFIED LOACTION OF MALIGNANCY    • No Known Problems Maternal Uncle    • No Known Problems Paternal Aunt    • No Known Problems Paternal Aunt    • Coronary artery disease Paternal Uncle         MI   • No Known Problems Paternal Uncle    • No Known Problems Paternal Uncle        Meds/Allergies       Current Outpatient Medications:   •  bisacodyl (DULCOLAX) 5 mg EC tablet  •  cetirizine (ZyrTEC) 10 mg tablet  •  Cholecalciferol (Vitamin D3) 125 MCG (5000 UT) TABS  •  FLUoxetine (PROzac) 20 MG tablet  •  fluticasone (FLONASE) 50 mcg/act nasal spray  •  multivitamin (THERAGRAN) TABS  •  Na Sulfate-K Sulfate-Mg Sulf (Suprep Bowel Prep Kit) 17 5-3 13-1 6 GM/177ML SOLN  •  polyethylene glycol (GLYCOLAX) 17 GM/SCOOP powder  •  polyethylene glycol (GOLYTELY) 4000 mL solution  •  QUEtiapine (SEROquel XR) 50 mg    Allergies   Allergen Reactions   • Ibuprofen Hives   • Quetiapine Hives     [facility] - [id number]: May have extended release Seraquil   • Sulfa Antibiotics Hives and Rash   • Sulfamethoxazole-Trimethoprim Hives and Rash           Objective     Blood pressure 132/73, pulse 71, temperature 97 8 °F (36 6 °C), weight 77 kg (169 lb 12 8 oz), last menstrual period 01/15/2023, not currently breastfeeding  Body mass index is 31 06 kg/m²          PHYSICAL EXAM:      General Appearance:   Alert, cooperative, no distress   HEENT:   Normocephalic, atraumatic, anicteric      Neck:  Supple, symmetrical, trachea midline   Lungs:   Clear to auscultation bilaterally; no rales, rhonchi or wheezing; respirations unlabored    Heart[de-identified]   Regular rate and rhythm; no murmur, rub, or gallop  Abdomen:   Soft, non-tender, non-distended; normal bowel sounds; no masses, no organomegaly    Genitalia:   Deferred    Rectal:   Deferred    Extremities:  No cyanosis, clubbing or edema    Pulses:  2+ and symmetric    Skin:  No jaundice, rashes, or lesions    Lymph nodes:  No palpable cervical lymphadenopathy        Lab Results:   No visits with results within 1 Day(s) from this visit  Latest known visit with results is:   Annual Exam on 09/19/2022   Component Date Value   • Case Report 09/19/2022                      Value:Gynecologic Cytology Report                       Case: Antonio Ritchie Provider:  Chris Peralta MD   Collected:           09/19/2022 1040              Ordering Location:     [address] [place] [address] Received:            09/19/2022 1040                                     GYN                                                                          First Screen:          Felipe Baig                                                               Specimen:    LIQUID-BASED PAP, SCREENING, Cervix                                                       • Primary Interpretation 09/19/2022 Negative for intraepithelial lesion or malignancy    • Specimen Adequacy 09/19/2022 Satisfactory for evaluation  Endocervical/transformation zone component present  • Additional Information 09/19/2022                      Value: This result contains rich text formatting which cannot be displayed here  • LMP 09/19/2022 8/23/2022          Radiology Results:   US breast screening bilateral complete (ABUS)    Result Date: 1/25/2023  Narrative: DIAGNOSIS: Dense breast tissue; Breast cancer screening other than mammogram TECHNIQUE: Automated breast ultrasound images were obtained on the [employer] system    Imaging was obtained in standard projections including AP, lateral and medial for both breasts, inclusive of all four quadrants and the retroareolar region  COMPARISONS: Prior breast imaging dated: 08/01/2022, 02/01/2022, 01/24/2022, 07/29/2021, 01/21/2021, 07/17/2020, 01/17/2020, 01/17/2020, 07/16/2019, 07/16/2019, 03/14/2019, 01/10/2019, 01/10/2019, 07/13/2018, 07/13/2018, 01/12/2018, 01/10/2018, 07/10/2017, 01/09/2017, and 07/06/2016 RELEVANT HISTORY: Family Breast Cancer History: History of breast cancer in Maternal Grandmother  Family Medical History: Family medical history includes breast cancer in maternal grandmother, colon cancer in mother, and ovarian cancer in paternal grandmother  Personal History: No known relevant hormone history  Surgical history includes breast excisional biopsy  No known relevant medical history  RISK ASSESSMENT: 5 Year Tyrer-Cuzick: 3 43 % 10 Year Tyrer-Cuzick: 7 13 % Lifetime Tyrer-Cuzick: 29 98 % TISSUE COMPOSITION: Heterogeneous background echotexture INDICATION: Carolyn Esparza is a 49 y o  female with dense breasts presenting for automated breast ultrasound screening  FINDINGS: Bilateral There are no suspicious masses or areas of architectural distortion  Impression:  No evidence of malignancy  Automated breast ultrasound is an adjunct, not a replacement, for routine yearly screening mammography  ASSESSMENT/BI-RADS CATEGORY: Left: 1 - Negative Right: 1 - Negative Overall: 1 - Negative RECOMMENDATION:      - Routine screening for both breasts   Workstation ID: [id number]

## 2023-02-02 NOTE — PATIENT INSTRUCTIONS
Scheduled date of Colonoscocpy (as of today) 02/14/2023  Physician performing: Dr Ralph Ness  Location of procedure:   [place]  Instructions given to patient:  Suprep  Clearances: n/a

## 2023-02-03 ENCOUNTER — OFFICE VISIT (OUTPATIENT)
Dept: FAMILY MEDICINE CLINIC | Facility: CLINIC | Age: 48
End: 2023-02-03

## 2023-02-03 VITALS
TEMPERATURE: 99 F | HEIGHT: 62 IN | DIASTOLIC BLOOD PRESSURE: 80 MMHG | HEART RATE: 87 BPM | OXYGEN SATURATION: 99 % | SYSTOLIC BLOOD PRESSURE: 130 MMHG | RESPIRATION RATE: 16 BRPM | BODY MASS INDEX: 31.65 KG/M2 | WEIGHT: 172 LBS

## 2023-02-03 DIAGNOSIS — M54.9 BACK PAIN, UNSPECIFIED BACK LOCATION, UNSPECIFIED BACK PAIN LATERALITY, UNSPECIFIED CHRONICITY: ICD-10-CM

## 2023-02-03 DIAGNOSIS — K64.4 EXTERNAL HEMORRHOID: Primary | ICD-10-CM

## 2023-02-03 NOTE — PATIENT INSTRUCTIONS
Lower Back Exercises   WHAT YOU NEED TO KNOW:   Lower back exercises help heal and strengthen your back muscles to prevent another injury  Ask your healthcare provider if you need to see a physical therapist for more advanced exercises  DISCHARGE INSTRUCTIONS:   Return to the emergency department if:   You have severe pain that prevents you from moving  Contact your healthcare provider if:   Your pain becomes worse  You have new pain  You have questions or concerns about your condition or care  Do lower back exercises safely:   Do the exercises on a mat or firm surface  (not on a bed) to support your spine and prevent low back pain  Move slowly and smoothly  Avoid fast or jerky motions  Breathe normally  Do not hold your breath  Stop if you feel pain  It is normal to feel some discomfort at first  Regular exercise will help decrease your discomfort over time  Lower back exercises: Your healthcare provider may recommend that you do back exercises 10 to 30 minutes each day  He may also recommend that you do exercises 1 to 3 times each day  Ask your healthcare provider which exercises are best for you and how often to do them  Ankle pumps:  Lie on your back  Move your foot up (with your toes pointing toward your head)  Then, move your foot down (with your toes pointing away from you)  Repeat this exercise 10 times on each side  Heel slides:  Lie on your back  Slowly bend one leg and then straighten it  Next, bend the other leg and then straighten it  Repeat 10 times on each side  Pelvic tilt:  Lie on your back with your knees bent and feet flat on the floor  Place your arms in a relaxed position beside your body  Tighten the muscles of your abdomen and flatten your back against the floor  Hold for 5 seconds  Repeat 5 times  Back stretch:  Lie on your back with your hands behind your head  Bend your knees and turn the lower half of your body to one side   Hold this position for 10 seconds  Repeat 3 times on each side  Straight leg raises:  Lie on your back with one leg straight  Bend the other knee  Tighten your abdomen and then slowly lift the straight leg up about 6 to 12 inches off the floor  Hold for 1 to 5 seconds  Lower your leg slowly  Repeat 10 times on each leg  Knee-to-chest:  Lie on your back with your knees bent and feet flat on the floor  Pull one of your knees toward your chest and hold it there for 5 seconds  Return your leg to the starting position  Lift the other knee toward your chest and hold for 5 seconds  Do this 5 times on each side  Cat and camel:  Place your hands and knees on the floor  Arch your back upward toward the ceiling and lower your head  Round out your spine as much as you can  Hold for 5 seconds  Lift your head upward and push your chest downward toward the floor  Hold for 5 seconds  Do 3 sets or as directed  Wall squats:  Stand with your back against a wall  Tighten the muscles of your abdomen  Slowly lower your body until your knees are bent at a 45 degree angle  Hold this position for 5 seconds  Slowly move back up to a standing position  Repeat 10 times  Curl up:  Lie on your back with your knees bent and feet flat on the floor  Place your hands, palms down, underneath the curve in your lower back  Next, with your elbows on the floor, lift your shoulders and chest 2 to 3 inches  Keep your head in line with your shoulders  Hold this position for 5 seconds  When you can do this exercise without pain for 10 to 15 seconds, you may add a rotation  While your shoulders and chest are lifted off the ground, turn slightly to the left and hold  Repeat on the other side  Bird dog:  Place your hands and knees on the floor  Keep your wrists directly below your shoulders and your knees directly below your hips  Pull your belly button in toward your spine  Do not flatten or arch your back   Tighten your abdominal muscles  Raise one arm straight out so that it is aligned with your head  Next, raise the leg opposite your arm  Hold this position for 15 seconds  Lower your arm and leg slowly and change sides  Do 5 sets  © Copyright [employer] 2022 Information is for End User's use only and may not be sold, redistributed or otherwise used for commercial purposes  All illustrations and images included in CareNotes® are the copyrighted property of A D A M , Inc  or Rosario Swain   The above information is an  only  It is not intended as medical advice for individual conditions or treatments  Talk to your doctor, nurse or pharmacist before following any medical regimen to see if it is safe and effective for you  Upper Back Exercises   AMBULATORY CARE:   Upper back exercises  help heal and strengthen your back muscles and prevent another injury  Ask your healthcare provider if you need to see a physical therapist for more advanced exercises  Do the exercises on a mat or firm surface  (not on a bed) to support your spine  Move slowly and smoothly  Avoid fast or jerky motions  Breathe normally  Do not hold your breath  Stop if you feel pain  It is normal to feel some discomfort at first  Regular exercise will help decrease your discomfort over time  Seek care immediately if:   You have severe pain that prevents you from moving  Contact your healthcare provider if:   Your pain becomes worse  You have new pain  You have questions or concerns about your condition, care, or exercise program     Perform upper back exercises safely:  Ask your healthcare provider which of the following exercises are best for you and how often to do them  Head rolls:  Sit in a chair or stand  Bring your chin toward your chest and roll your head to the right  Your ear should be over your shoulder  Hold this position for 5 seconds  Roll your head back toward your chest and to the left  Your ear should be over your left shoulder  Hold this position for 5 seconds  Next, roll your head back slowly in a clockwise [place] and repeat 3 times  Do 3 sets of head rolls  Scapular squeeze:  Sit or stand with your arms at your sides  Squeeze your shoulder blades together and hold for 3 seconds  Relax and repeat 3 times  Pectoralis stretch:   a doorway  Lift your hands and place them on each side of the door frame or wall slightly higher than your head  Lean forward slowly until you feel a gentle stretch  Hold for 15 seconds  Repeat 3 times, or as directed  Cat and camel exercise:  Place your hands and knees on the floor  Arch your back upward toward the ceiling and lower your head  Round out your spine as much as you can  Hold for 5 seconds  Lift your head upward and push your chest downward toward the floor  Hold for 5 seconds  Do 3 sets or as directed  Bird dog:  Place your hands and knees on the floor  Keep your wrists directly below your shoulders and your knees directly below your hips  Pull your belly button in toward your spine  Do not flatten or arch your back  Tighten your abdominal muscles  Raise one arm straight out so that it is aligned with your head  Next, raise the leg opposite your arm  Hold this position for 15 seconds  Lower your arm and leg slowly and change sides  Do 5 sets  © Copyright [employer] 2022 Information is for End User's use only and may not be sold, redistributed or otherwise used for commercial purposes  All illustrations and images included in CareNotes® are the copyrighted property of A [employer] A M [employer] Inc  or Rosario Swain   The above information is an  only  It is not intended as medical advice for individual conditions or treatments  Talk to your doctor, nurse or pharmacist before following any medical regimen to see if it is safe and effective for you

## 2023-02-13 RX ORDER — SODIUM CHLORIDE 9 MG/ML
125 INJECTION, SOLUTION INTRAVENOUS CONTINUOUS
Status: CANCELLED | OUTPATIENT
Start: 2023-02-13

## 2023-02-13 RX ORDER — ONDANSETRON 2 MG/ML
4 INJECTION INTRAMUSCULAR; INTRAVENOUS ONCE AS NEEDED
Status: CANCELLED | OUTPATIENT
Start: 2023-02-13

## 2023-02-14 ENCOUNTER — ANESTHESIA (OUTPATIENT)
Dept: GASTROENTEROLOGY | Facility: MEDICAL CENTER | Age: 48
End: 2023-02-14

## 2023-02-14 ENCOUNTER — ANESTHESIA EVENT (OUTPATIENT)
Dept: GASTROENTEROLOGY | Facility: MEDICAL CENTER | Age: 48
End: 2023-02-14

## 2023-02-14 ENCOUNTER — HOSPITAL ENCOUNTER (OUTPATIENT)
Dept: GASTROENTEROLOGY | Facility: MEDICAL CENTER | Age: 48
Setting detail: OUTPATIENT SURGERY
Discharge: HOME | End: 2023-02-14
Attending: INTERNAL MEDICINE

## 2023-02-14 VITALS
BODY MASS INDEX: 31.64 KG/M2 | HEIGHT: 62 IN | OXYGEN SATURATION: 96 % | HEART RATE: 79 BPM | SYSTOLIC BLOOD PRESSURE: 137 MMHG | WEIGHT: 171.96 LBS | TEMPERATURE: 98.4 F | DIASTOLIC BLOOD PRESSURE: 65 MMHG | RESPIRATION RATE: 16 BRPM

## 2023-02-14 DIAGNOSIS — K62.5 RECTAL BLEEDING: ICD-10-CM

## 2023-02-14 LAB
EXT PREGNANCY TEST URINE: NEGATIVE
EXT. CONTROL: NORMAL

## 2023-02-14 RX ORDER — PROPOFOL 10 MG/ML
INJECTION, EMULSION INTRAVENOUS AS NEEDED
Status: DISCONTINUED | OUTPATIENT
Start: 2023-02-14 | End: 2023-02-14

## 2023-02-14 RX ORDER — LIDOCAINE HYDROCHLORIDE 20 MG/ML
INJECTION, SOLUTION EPIDURAL; INFILTRATION; INTRACAUDAL; PERINEURAL AS NEEDED
Status: DISCONTINUED | OUTPATIENT
Start: 2023-02-14 | End: 2023-02-14

## 2023-02-14 RX ORDER — SODIUM CHLORIDE 9 MG/ML
125 INJECTION, SOLUTION INTRAVENOUS CONTINUOUS
Status: DISCONTINUED | OUTPATIENT
Start: 2023-02-14 | End: 2023-02-18 | Stop reason: HOSPADM

## 2023-02-14 RX ORDER — ONDANSETRON 2 MG/ML
4 INJECTION INTRAMUSCULAR; INTRAVENOUS ONCE AS NEEDED
Status: DISCONTINUED | OUTPATIENT
Start: 2023-02-14 | End: 2023-02-18 | Stop reason: HOSPADM

## 2023-02-14 RX ADMIN — PROPOFOL 30 MG: 10 INJECTION, EMULSION INTRAVENOUS at 10:38

## 2023-02-14 RX ADMIN — PROPOFOL 100 MG: 10 INJECTION, EMULSION INTRAVENOUS at 10:33

## 2023-02-14 RX ADMIN — LIDOCAINE HYDROCHLORIDE 100 MG: 20 INJECTION, SOLUTION EPIDURAL; INFILTRATION; INTRACAUDAL; PERINEURAL at 10:33

## 2023-02-14 RX ADMIN — PROPOFOL 30 MG: 10 INJECTION, EMULSION INTRAVENOUS at 10:45

## 2023-02-14 RX ADMIN — PROPOFOL 30 MG: 10 INJECTION, EMULSION INTRAVENOUS at 10:42

## 2023-02-14 RX ADMIN — PROPOFOL 30 MG: 10 INJECTION, EMULSION INTRAVENOUS at 10:36

## 2023-02-14 RX ADMIN — PROPOFOL 30 MG: 10 INJECTION, EMULSION INTRAVENOUS at 10:40

## 2023-02-14 RX ADMIN — PROPOFOL 30 MG: 10 INJECTION, EMULSION INTRAVENOUS at 10:47

## 2023-02-14 RX ADMIN — SODIUM CHLORIDE 125 ML/HR: 0.9 INJECTION, SOLUTION INTRAVENOUS at 10:19

## 2023-02-14 NOTE — ANESTHESIA PREPROCEDURE EVALUATION
Procedure:  COLONOSCOPY    (+) BMI 31 45     Physical Exam    Airway    Mallampati score: II  TM Distance: >3 FB  Neck ROM: full     Dental       Cardiovascular  Rhythm: regular, Rate: normal,     Pulmonary  Breath sounds clear to auscultation,     Other Findings        Anesthesia Plan  ASA Score- 2     Anesthesia Type- IV sedation with anesthesia with ASA Monitors  Additional Monitors:   Airway Plan:           Plan Factors-Exercise tolerance (METS): >4 METS  Chart reviewed  Existing labs reviewed  Induction- intravenous  Postoperative Plan-     Informed Consent- Anesthetic plan and risks discussed with patient

## 2023-02-14 NOTE — ANESTHESIA POSTPROCEDURE EVALUATION
Post-Op Assessment Note    CV Status:  Stable    Pain management: adequate     Mental Status:  Alert and awake   Hydration Status:  Euvolemic   PONV Controlled:  Controlled   Airway Patency:  Patent      Post Op Vitals Reviewed: Yes      Staff: Anesthesiologist         No notable events documented      BP      Temp      Pulse     Resp      SpO2      /65   Pulse 79   Temp 98 4 °F (36 9 °C) (Temporal)   Resp 16   Ht 5' 2" (1 575 m)   Wt 78 kg (171 lb 15 3 oz)   LMP 01/15/2023 (Exact Date) Comment: uhcg negative  SpO2 96%   BMI 31 45 kg/m²

## 2023-02-14 NOTE — H&P
History and Physical -  [facility]  Tae Velazco 49 y o  female MRN: [id number]                  HPI: Tae Velazco is a 49y o  year old female who presents for rectal bleeding      REVIEW OF SYSTEMS: Per the HPI, and otherwise unremarkable      Historical Information   Past Medical History:   Diagnosis Date   • Acute sinusitis    • Anxiety    • Dense breast tissue    • History of early menarche     AGE 14   • IBS (irritable bowel syndrome)    • OCD (obsessive compulsive disorder)    • Panic attacks    • Pilonidal cyst    • PONV (postoperative nausea and vomiting)    • Seasonal allergies    • Sinus problem    • Spontaneous     • Vitamin D deficiency      Past Surgical History:   Procedure Laterality Date   • BREAST CYST EXCISION Right     benign   • BREAST SURGERY      EXCISION OF BREAST SINGLE LESION    • DILATION AND CURETTAGE, DIAGNOSTIC / THERAPEUTIC     • MOUTH SURGERY      WISDOM TEETH    • OTHER SURGICAL HISTORY      PILONIDAL CYST RESECTION   • SKIN BIOPSY       Social History   Social History     Substance and Sexual Activity   Alcohol Use No     Social History     Substance and Sexual Activity   Drug Use No     Social History     Tobacco Use   Smoking Status Never   Smokeless Tobacco Never     Family History   Problem Relation Age of Onset   • Colon cancer Mother         AGE 62    • Diabetes Mother    • Gallbladder disease Mother    • Hypertension Mother    • Irritable bowel syndrome Mother    • Kidney disease Mother         RECURRENT   • Hypertension Father    • Atrial fibrillation Father    • Melanoma Brother         AGE 37   • No Known Problems Daughter    • No Known Problems Daughter    • Breast cancer Maternal Grandmother         AGE 55    • Other Maternal Grandfather         black lung   • Stomach cancer Paternal Grandmother         AGE 55    • Ovarian cancer Paternal Grandmother         AGE 55    • Coronary artery disease Paternal Grandfather massive MI   • No Known Problems Maternal Aunt    • Pancreatic cancer Maternal Uncle         UNSPECIFIED LOACTION OF MALIGNANCY    • No Known Problems Maternal Uncle    • No Known Problems Paternal Aunt    • No Known Problems Paternal Aunt    • Coronary artery disease Paternal Uncle         MI   • No Known Problems Paternal Uncle    • No Known Problems Paternal Uncle        Meds/Allergies       Current Outpatient Medications:   •  Cholecalciferol (Vitamin D3) 125 MCG (5000 UT) TABS  •  FLUoxetine (PROzac) 20 MG tablet  •  multivitamin (THERAGRAN) TABS  •  polyethylene glycol (GLYCOLAX) 17 GM/SCOOP powder  •  QUEtiapine (SEROquel XR) 50 mg  •  bisacodyl (DULCOLAX) 5 mg EC tablet  •  cetirizine (ZyrTEC) 10 mg tablet  •  fluticasone (FLONASE) 50 mcg/act nasal spray  •  Na Sulfate-K Sulfate-Mg Sulf (Suprep Bowel Prep Kit) 17 5-3 13-1 6 GM/177ML SOLN  •  polyethylene glycol (GOLYTELY) 4000 mL solution    Current Facility-Administered Medications:   •  sodium chloride 0 9 % infusion, 125 mL/hr, Intravenous, Continuous    Allergies   Allergen Reactions   • Ibuprofen Hives   • Quetiapine Hives     Annotation - [id number]: May have extended release Seraquil   • Sulfa Antibiotics Hives and Rash   • Sulfamethoxazole-Trimethoprim Hives and Rash       Objective     /85   Pulse 66   Temp 98 4 °F (36 9 °C) (Temporal)   Resp 14   Ht 5' 2" (1 575 m)   Wt 78 kg (171 lb 15 3 oz)   LMP 01/15/2023 (Exact Date) Comment: uhcg negative  SpO2 100%   BMI 31 45 kg/m²       PHYSICAL EXAM    Gen: NAD  Head: NCAT  CV: RRR  CHEST: Clear  ABD: soft, NT/ND  EXT: no edema      ASSESSMENT/PLAN:  This is a 49y o  year old female here for colonsocopy, and she is stable and optimized for her procedure

## 2023-03-16 ENCOUNTER — AMB VIDEO VISIT (OUTPATIENT)
Dept: OTHER | Facility: HOSPITAL | Age: 48
End: 2023-03-16

## 2023-03-16 NOTE — CARE ANYWHERE EVISITS
Visit Summary for Lamont Liu - Gender: Female - Date of Birth: [id number]  Date: [id number] - Duration: 4 minutes  Patient: Lamont Liu  Provider: Dejah Montejo    Patient Contact Information  Address  [address] [place] [id number]      Visit Topics  Positive COVID test [Added By: Self - 2023-03-16]    Triage Questions   What is your current physical address in the event of a medical emergency? Answer []  Are you allergic to any medications? Answer []  Are you now or could you be pregnant? Answer []  Do you have any immune system compromise or chronic lung   disease? Answer []  Do you have any vulnerable family members in the home (infant, pregnant, cancer, elderly)? Answer []     Conversation Transcripts  [0A][0A] [Notification] You are connected with [facility] [0A][Notification] Lamont Liu is located in [place]  [0A][Notification] Lamont Liu has shared health history  Di Harper  [0A][Notification] A phone connection is being   added  [0A][Notification] Calling Dejah Escobar  [0A][Notification] Calling Lamont Liu  Krystin Saleem  [0A][Notification] Dejah Montejo is establishing connection [0A][Notification] SCOTT Aguilar is establishing connection  [0A]    Diagnosis  COVID-19    Procedures  Value: [id number] Code: CPT-4 UNLISTED E&M SERVICE    Medications Prescribed    Paxlovid (EUA)  Dose : 3 tablets  Route : oral  Frequency : 2 times a day  Refills : 0  Instructions to the Pharmacist : Substitutions allowed      Provider Notes  [0A][0A] Method of communication: audio[0A]Phone number of patient: see chart[0A]HPI:  This patient has[0A]had one day of temperature 100 6, sore throat, nasal congestion, some trouble[0A]breathing  She also has an anxiety[0A]disorder with intrusive   thoughts obsessive compulsive disorder  She is having[0A]some significant anxiety over being positive for COVID  She just tested[0A]positive with two tests today  [0A]ROS: Positive for fever and some shortness of breath[0A]Allergies  bactrim and one form   of Seroquel[0A]Medications: Seroquel extended release and fluoxetine[0A]Past medical history:  negative for hypertension for diabetes[0A]Past surgical history negative[0A]Social history:  does[0A]not smoke[0A] [0A]Exam:  Weight  168 pounds 62 inches tall   BMI 30[0A]General:   oriented times 3 did not sound like she was[0A]short short of breath  [0A]Diagnosis: Positive for COVID with risk factors for[0A]hospitalization[0A]Plan  Paxlovid 300 mg (150 mg x 2)-100 mg tablet[0A](EUA)[0A]Directions: 3   tablets[0A]2 times a day for 5 days  Quantity to dispense: 30 Tablet  Refills: 0  Date[0A]Written: 03/15/2023  Others: Substitutions allowed[0A]     was prescribed for positive for COVID with[0A]risk factors for hospitalization[0A]     Patient was   told[0A]to go to emergency room if getting worse [0A]1  If there are any[0A]questions or problems with the prescription, call [phone] anytime for[0A]assistance  [0A][0A]2  Please re-connect for another online visit or[0A]see an in-person provider   should your symptoms worsen or persist [0A][0A][0A][0A]Patient voiced understanding and agrees to plan  [0A] [0A]    Electronically signed by: Maldonado Danielson(NPI [id number])

## 2023-08-15 ENCOUNTER — HOSPITAL ENCOUNTER (OUTPATIENT)
Dept: MAMMOGRAPHY | Facility: MEDICAL CENTER | Age: 48
Discharge: HOME | End: 2023-08-15
Payer: COMMERCIAL

## 2023-08-15 VITALS — BODY MASS INDEX: 31.47 KG/M2 | HEIGHT: 62 IN | WEIGHT: 171 LBS

## 2023-08-15 DIAGNOSIS — Z12.31 SCREENING MAMMOGRAM, ENCOUNTER FOR: ICD-10-CM

## 2023-08-15 PROCEDURE — 77063 BREAST TOMOSYNTHESIS BI: CPT

## 2023-08-15 PROCEDURE — 77067 SCR MAMMO BI INCL CAD: CPT

## 2023-09-20 ENCOUNTER — ANNUAL EXAM (OUTPATIENT)
Dept: OBGYN CLINIC | Facility: CLINIC | Age: 48
End: 2023-09-20
Payer: COMMERCIAL

## 2023-09-20 VITALS
WEIGHT: 187.2 LBS | DIASTOLIC BLOOD PRESSURE: 80 MMHG | BODY MASS INDEX: 34.45 KG/M2 | SYSTOLIC BLOOD PRESSURE: 140 MMHG | HEIGHT: 62 IN

## 2023-09-20 DIAGNOSIS — N95.1 PERIMENOPAUSAL SYMPTOMS: ICD-10-CM

## 2023-09-20 DIAGNOSIS — Z12.31 ENCOUNTER FOR SCREENING MAMMOGRAM FOR MALIGNANT NEOPLASM OF BREAST: ICD-10-CM

## 2023-09-20 DIAGNOSIS — Z01.419 PAP SMEAR, AS PART OF ROUTINE GYNECOLOGICAL EXAMINATION: ICD-10-CM

## 2023-09-20 DIAGNOSIS — Z01.419 ENCOUNTER FOR GYNECOLOGICAL EXAMINATION WITHOUT ABNORMAL FINDING: Primary | ICD-10-CM

## 2023-09-20 PROCEDURE — S0612 ANNUAL GYNECOLOGICAL EXAMINA: HCPCS | Performed by: OBSTETRICS & GYNECOLOGY

## 2023-09-20 PROCEDURE — G0476 HPV COMBO ASSAY CA SCREEN: HCPCS | Performed by: OBSTETRICS & GYNECOLOGY

## 2023-09-20 PROCEDURE — G0145 SCR C/V CYTO,THINLAYER,RESCR: HCPCS | Performed by: OBSTETRICS & GYNECOLOGY

## 2023-09-20 RX ORDER — QUETIAPINE FUMARATE 200 MG/1
400 TABLET, FILM COATED ORAL
COMMUNITY
Start: 2023-09-04

## 2023-09-20 RX ORDER — FLUOXETINE HYDROCHLORIDE 40 MG/1
40 CAPSULE ORAL DAILY
COMMUNITY
Start: 2023-09-04

## 2023-09-20 RX ORDER — CLONAZEPAM 0.5 MG/1
TABLET ORAL
COMMUNITY
Start: 2023-03-18

## 2023-09-20 RX ORDER — CLONAZEPAM 0.5 MG/1
TABLET ORAL
COMMUNITY
Start: 2023-09-18

## 2023-09-20 NOTE — PATIENT INSTRUCTIONS
Normal gynecological physical examination. Self-breast examination stressed. Mammogram ordered. Discussed regular exercise, healthy diet, importance of vitamin D and calcium supplements. Discussed importance of sun block use during periods of prolonged sun exposure. Patient will be seen in 1 year for routine gynecologic and medical examination. Patient will call office for any problems, concerns, or issues which may arise during the interim.   Patient told to call should any significant dysfunctional bleeding develop during the upcoming year as well

## 2023-09-20 NOTE — PROGRESS NOTES
Assessment/Plan:    No problem-specific Assessment & Plan notes found for this encounter. Diagnoses and all orders for this visit:    Encounter for gynecological examination without abnormal finding    Pap smear, as part of routine gynecological examination  -     Liquid-based pap, screening    Encounter for screening mammogram for malignant neoplasm of breast  -     Mammo screening bilateral w 3d & cad; Future    Perimenopausal symptoms    Other orders  -     clonazePAM (KlonoPIN) 0.5 mg tablet  -     clonazePAM (KlonoPIN) 0.5 mg tablet; TAKE ONE-HALF TABLET BY MOUTH EVERY MORNING AND TAKE ONE TABLET BY MOUTH AT BEDTIME (Patient not taking: Reported on 9/20/2023)  -     FLUoxetine (PROzac) 40 MG capsule; Take 40 mg by mouth daily  -     QUEtiapine (SEROquel) 200 mg tablet; Take 400 mg by mouth daily at bedtime          Normal gynecological physical examination. Self-breast examination stressed. Mammogram ordered. Discussed regular exercise, healthy diet, importance of vitamin D and calcium supplements. Discussed importance of sun block use during periods of prolonged sun exposure. Patient will be seen in 1 year for routine gynecologic and medical examination. Patient will call office for any problems, concerns, or issues which may arise during the interim. Subjective: Mayo Parisi is a 51year old female presenting to the office for her annual exam. Her LMP was in January and then for the last two days she has been experiencing spotting. She denies any vasomotor symptoms but she does notice a slight weight gain without changing her diet/lifestyle. Patient denies pelvic pain, abdominal pain, changes in urination, changes in BM, abnormal vaginal discharge, vaginal discomfort, breast changes, or fevers. She is up to date with her mammogram. She receives colonscopies due to her family history, which they have been unremarkable. Patient eats a balanced diet and tries to stay active.     Patient was encouraged to call if she has any questions, complaints or concerns in the interim. Patient ID: Yolanda [place] is a 51 y.o. female who presents today for her annual gynecologic and medical examination    Menstrual status: Patients menses are irregular. Patient had a period in January, but notes she has had spotting in the last two days. Vasomotor symptoms: Patient denies any vasomotor symptoms. Patient reports normal appetite    Patient reports normal bowel and bladder habits    Patient denies any significant pelvic or abdominal pain    Patient denies any headaches, chest pain, shortness of breath fever shakes or chills    Patient denies any COVID 19 symptoms including cough or loss of taste or smell    COVID vaccine status: Patient is up to date with COVID vaccination. Medical problems:IBS    Colonoscopy status:Patient is up to date. She receives early screening due to her family history. Mammogram status:Patient is up to date on her mammogram. Patient was encouraged to perform self breast exams. The following portions of the patient's history were reviewed and updated as appropriate: allergies, current medications, past family history, past medical history, past social history, past surgical history and problem list.    Review of Systems   Constitutional: Negative. Negative for appetite change, diaphoresis, fatigue, fever and unexpected weight change. HENT: Negative. Eyes: Negative. Respiratory: Negative. Cardiovascular: Negative. Gastrointestinal: Negative. Negative for abdominal pain, blood in stool, constipation, diarrhea, nausea and vomiting. Endocrine: Negative. Negative for cold intolerance and heat intolerance. Genitourinary: Negative. Negative for dysuria, frequency, hematuria, urgency, vaginal bleeding, vaginal discharge and vaginal pain. Musculoskeletal: Negative. Skin: Negative. Allergic/Immunologic: Negative. Neurological: Negative.     Hematological: Negative. Negative for adenopathy. Psychiatric/Behavioral: Negative. Objective:      /80   Ht 5' 2" (1.575 m)   Wt 84.9 kg (187 lb 3.2 oz)   BMI 34.24 kg/m²          Physical Exam  Constitutional:       General: She is not in acute distress. Appearance: Normal appearance. She is well-developed. She is not diaphoretic. HENT:      Head: Normocephalic and atraumatic. Eyes:      Pupils: Pupils are equal, round, and reactive to light. Cardiovascular:      Rate and Rhythm: Normal rate and regular rhythm. Heart sounds: Normal heart sounds. No murmur heard. No friction rub. No gallop. Pulmonary:      Effort: Pulmonary effort is normal.      Breath sounds: Normal breath sounds. Chest:   Breasts:     Breasts are symmetrical.      Right: No inverted nipple, mass, nipple discharge, skin change or tenderness. Left: No inverted nipple, mass, nipple discharge, skin change or tenderness. Abdominal:      General: Bowel sounds are normal.      Palpations: Abdomen is soft. Genitourinary:     General: Normal vulva. Exam position: Supine. Labia:         Right: No rash, tenderness or lesion. Left: No rash, tenderness or lesion. Urethra: No prolapse, urethral pain or urethral lesion. Vagina: Normal. No vaginal discharge, erythema, tenderness or bleeding. Cervix: No discharge or friability. Uterus: Not enlarged and not tender. Adnexa:         Right: No mass, tenderness or fullness. Left: No mass, tenderness or fullness. Musculoskeletal:         General: Normal range of motion. Cervical back: Normal range of motion and neck supple. Lymphadenopathy:      Cervical: No cervical adenopathy. Upper Body:      Right upper body: No supraclavicular adenopathy. Left upper body: No supraclavicular adenopathy. Skin:     General: Skin is warm and dry. Findings: No rash.    Neurological:      General: No focal deficit present. Mental Status: She is alert and oriented to person, place, and time. Psychiatric:         Mood and Affect: Mood normal.         Speech: Speech normal.         Behavior: Behavior normal.         Thought Content:  Thought content normal.         Judgment: Judgment normal.

## 2023-09-29 LAB
LAB AP GYN PRIMARY INTERPRETATION: NORMAL
Lab: NORMAL

## 2023-11-21 ENCOUNTER — OFFICE VISIT (OUTPATIENT)
Dept: SURGICAL ONCOLOGY | Facility: CLINIC | Age: 48
End: 2023-11-21
Payer: COMMERCIAL

## 2023-11-21 VITALS
OXYGEN SATURATION: 98 % | HEART RATE: 76 BPM | WEIGHT: 188 LBS | TEMPERATURE: 98 F | SYSTOLIC BLOOD PRESSURE: 130 MMHG | HEIGHT: 62 IN | BODY MASS INDEX: 34.6 KG/M2 | DIASTOLIC BLOOD PRESSURE: 80 MMHG

## 2023-11-21 DIAGNOSIS — Z80.3 FAMILY HISTORY OF BREAST CANCER IN FEMALE: ICD-10-CM

## 2023-11-21 DIAGNOSIS — R92.30 DENSE BREAST TISSUE: ICD-10-CM

## 2023-11-21 DIAGNOSIS — Z12.31 SCREENING MAMMOGRAM, ENCOUNTER FOR: Primary | ICD-10-CM

## 2023-11-21 DIAGNOSIS — Z12.39 BREAST CANCER SCREENING OTHER THAN MAMMOGRAM: ICD-10-CM

## 2023-11-21 PROBLEM — N63.20 LEFT BREAST LUMP: Status: RESOLVED | Noted: 2022-12-16 | Resolved: 2023-11-21

## 2023-11-21 PROCEDURE — 99213 OFFICE O/P EST LOW 20 MIN: CPT | Performed by: SURGERY

## 2023-11-21 NOTE — PROGRESS NOTES
Surgical Oncology Follow Up       [facility] [address]    Vinny Has  1975  [id number]  [facility] [place]  [facility] [address]    Chief Complaint   Patient presents with    Follow-up       Assessment/Plan   Diagnoses and all orders for this visit:    Screening mammogram, encounter for  -     Mammo screening bilateral w 3d & cad; Future    Dense breast tissue  -     US breast screening bilateral complete (ABUS); Future    Family history of breast cancer in female    Breast cancer screening other than mammogram  -     US breast screening bilateral complete (ABUS); Future        Advance Care Planning/Advance Directives:  Did not discuss  with the patient. Oncology History:    Oncology History    No history exists. History of Present Illness: Follow-up visit secondary to dense breast tissue and family history of breast cancer, no current breast referable concerns, apparently had a skin cyst that was inflamed previously on the left side  -Interval History: Benign imaging    Review of Systems:  Review of Systems   Constitutional:  Positive for unexpected weight change. Negative for appetite change and fever. HENT: Negative. Negative for trouble swallowing. Eyes: Negative. Respiratory: Negative. Negative for cough and shortness of breath. Cardiovascular: Negative. Negative for chest pain. Gastrointestinal: Negative. Negative for abdominal pain, nausea and vomiting. Endocrine: Negative. Genitourinary: Negative. Negative for dysuria. Musculoskeletal: Negative. Negative for arthralgias and myalgias. Skin: Negative. Allergic/Immunologic: Negative. Neurological: Negative. Negative for headaches. Hematological: Negative. Negative for adenopathy. Does not bruise/bleed easily. Psychiatric/Behavioral: Negative. Patient Active Problem List   Diagnosis    Anxiety    Dense breast tissue    IBS (irritable bowel syndrome)    OCD (obsessive compulsive disorder)    Panic attacks    Seasonal allergies    Vitamin D deficiency    Family history of breast cancer in female    Screening mammogram, encounter for    Breast cancer screening other than mammogram    Obesity (BMI 30.0-34. 9)    Multiple nevi     Past Medical History:   Diagnosis Date    Acute sinusitis     Anxiety     Dense breast tissue     History of early menarche     AGE 11    IBS (irritable bowel syndrome)     OCD (obsessive compulsive disorder)     Panic attacks     Pilonidal cyst     PONV (postoperative nausea and vomiting)     Seasonal allergies     Sinus problem     Spontaneous      Vitamin D deficiency      Past Surgical History:   Procedure Laterality Date    BREAST CYST EXCISION Right 2001    benign    BREAST SURGERY      EXCISION OF BREAST SINGLE LESION     DILATION AND CURETTAGE, DIAGNOSTIC / THERAPEUTIC      MOUTH SURGERY      WISDOM TEETH 1996    OTHER SURGICAL HISTORY      PILONIDAL CYST RESECTION    SKIN BIOPSY       Family History   Problem Relation Age of Onset    Colon cancer Mother         AGE 62     Diabetes Mother     Gallbladder disease Mother     Hypertension Mother     Irritable bowel syndrome Mother     Kidney disease Mother         RECURRENT    Hypertension Father     Atrial fibrillation Father     Melanoma Brother         AGE 37    No Known Problems Daughter     No Known Problems Daughter     Breast cancer Maternal Grandmother         AGE 55     Other Maternal Grandfather         black lung    Stomach cancer Paternal Grandmother         AGE 55     Ovarian cancer Paternal Grandmother         AGE 55     Coronary artery disease Paternal Grandfather         massive MI    No Known Problems Maternal Aunt     Pancreatic cancer Maternal Uncle         UNSPECIFIED LOACTION OF MALIGNANCY     No Known Problems Maternal Uncle     No Known Problems Paternal Aunt     No Known Problems Paternal Aunt     Coronary artery disease Paternal Uncle         MI    No Known Problems Paternal Uncle     No Known Problems Paternal Uncle      Social History     Socioeconomic History    Marital status: /Civil Union     Spouse name: Not on file    Number of children: Not on file    Years of education: Not on file    Highest education level: Not on file   Occupational History    Not on file   Tobacco Use    Smoking status: Never    Smokeless tobacco: Never   Vaping Use    Vaping Use: Never used   Substance and Sexual Activity    Alcohol use: No    Drug use: No    Sexual activity: Yes     Partners: Male   Other Topics Concern    Not on file   Social History Narrative    ALWAYS USES SEAT [address]      Social Determinants of Health     Financial Resource Strain: Not on file   Food Insecurity: Not on file   Transportation Needs: Not on file   Physical Activity: Not on file   Stress: Not on file   Social Connections: Not on file   Intimate Partner Violence: Not on file   Housing Stability: Not on file       Current Outpatient Medications:     cetirizine (ZyrTEC) 10 mg tablet, Take 10 mg by mouth daily, Disp: , Rfl:     Cholecalciferol (Vitamin D3) 125 MCG (5000 UT) TABS, Take 6,000 Units by mouth daily at bedtime, Disp: , Rfl:     clonazePAM (KlonoPIN) 0.5 mg tablet, , Disp: , Rfl:     FLUoxetine (PROzac) 40 MG capsule, Take 40 mg by mouth daily, Disp: , Rfl:     fluticasone (FLONASE) 50 mcg/act nasal spray, 1 spray into each nostril as needed for rhinitis, Disp: , Rfl:     multivitamin (THERAGRAN) TABS, Take 1 tablet by mouth daily, Disp: , Rfl:     QUEtiapine (SEROquel) 200 mg tablet, Take 400 mg by mouth daily at bedtime, Disp: , Rfl:     clonazePAM (KlonoPIN) 0.5 mg tablet, TAKE ONE-HALF TABLET BY MOUTH EVERY MORNING AND TAKE ONE TABLET BY MOUTH AT BEDTIME (Patient not taking: Reported on 9/20/2023), Disp: , Rfl:     FLUoxetine (PROzac) 20 MG tablet, Take 20 mg by mouth daily (Patient not taking: Reported on 9/20/2023), Disp: , Rfl:     polyethylene glycol (GLYCOLAX) 17 GM/SCOOP powder, Take 17 g by mouth daily (Patient not taking: Reported on 9/20/2023), Disp: 507 g, Rfl: 0    QUEtiapine (SEROquel XR) 50 mg, Take 50 mg by mouth daily at bedtime (Patient not taking: Reported on 9/20/2023), Disp: , Rfl:   Allergies   Allergen Reactions    Ibuprofen Hives    Quetiapine Hives     Annotation - [id number]: May have extended release Seraquil    Sulfa Antibiotics Hives and Rash    Sulfamethoxazole-Trimethoprim Hives and Rash       The following portions of the patient's history were reviewed and updated as appropriate: allergies, current medications, past family history, past medical history, past social history, past surgical history, and problem list.        Vitals:    11/21/23 1416   BP: 130/80   Pulse: 76   Temp: 98 °F (36.7 °C)   SpO2: 98%       Physical Exam  Constitutional:       General: She is not in acute distress. Appearance: Normal appearance. She is well-developed. HENT:      Head: Normocephalic and atraumatic. Chest:   Breasts:     Right: No swelling, bleeding, inverted nipple, mass, nipple discharge, skin change or tenderness. Left: No swelling, bleeding, inverted nipple, mass, nipple discharge, skin change or tenderness. Lymphadenopathy:      Upper Body:      Right upper body: No supraclavicular, axillary or pectoral adenopathy. Left upper body: No supraclavicular, axillary or pectoral adenopathy. Neurological:      Mental Status: She is alert and oriented to person, place, and time. Psychiatric:         Mood and Affect: Mood normal.           Results:      Imaging  8/15/2023 bilateral 3D screening mammogram was benign BI-RADS 2 with a density of 3    1/24/2023 automated breast ultrasound was benign    I reviewed the above imaging data.     Discussion/Summary: 39-year-old female with dense breast tissue and family history of breast cancer. Her genetic testing was negative. There are no concerns on exam today. Both her mammogram and ultrasound this year were benign. I will continue to order both image modalities for her. We will plan to see her again in 1 year or sooner should the need arise.

## 2023-12-03 DIAGNOSIS — E66.9 OBESITY (BMI 30.0-34.9): ICD-10-CM

## 2023-12-03 DIAGNOSIS — E55.9 VITAMIN D DEFICIENCY: Primary | ICD-10-CM

## 2023-12-03 DIAGNOSIS — R63.5 WEIGHT INCREASE: ICD-10-CM

## 2023-12-03 DIAGNOSIS — F41.9 ANXIETY: ICD-10-CM

## 2023-12-03 DIAGNOSIS — N92.6 MENSTRUAL CHANGES: ICD-10-CM

## 2023-12-04 ENCOUNTER — APPOINTMENT (OUTPATIENT)
Dept: LAB | Facility: MEDICAL CENTER | Age: 48
End: 2023-12-04
Payer: COMMERCIAL

## 2023-12-04 DIAGNOSIS — E55.9 VITAMIN D DEFICIENCY: ICD-10-CM

## 2023-12-04 DIAGNOSIS — R63.5 WEIGHT INCREASE: ICD-10-CM

## 2023-12-04 DIAGNOSIS — N92.6 MENSTRUAL CHANGES: ICD-10-CM

## 2023-12-04 DIAGNOSIS — F41.9 ANXIETY: ICD-10-CM

## 2023-12-04 DIAGNOSIS — E66.9 OBESITY (BMI 30.0-34.9): ICD-10-CM

## 2023-12-04 LAB
25(OH)D3 SERPL-MCNC: 27.6 NG/ML (ref 30–100)
ALBUMIN SERPL BCP-MCNC: 4.4 G/DL (ref 3.5–5)
ALP SERPL-CCNC: 105 U/L (ref 34–104)
ALT SERPL W P-5'-P-CCNC: 16 U/L (ref 7–52)
ANION GAP SERPL CALCULATED.3IONS-SCNC: 5 MMOL/L
AST SERPL W P-5'-P-CCNC: 15 U/L (ref 13–39)
BASOPHILS # BLD AUTO: 0.03 THOUSANDS/ÂΜL (ref 0–0.1)
BASOPHILS NFR BLD AUTO: 1 % (ref 0–1)
BILIRUB SERPL-MCNC: 0.68 MG/DL (ref 0.2–1)
BUN SERPL-MCNC: 13 MG/DL (ref 5–25)
CALCIUM SERPL-MCNC: 9.8 MG/DL (ref 8.4–10.2)
CHLORIDE SERPL-SCNC: 102 MMOL/L (ref 96–108)
CHOLEST SERPL-MCNC: 192 MG/DL
CO2 SERPL-SCNC: 33 MMOL/L (ref 21–32)
CREAT SERPL-MCNC: 0.68 MG/DL (ref 0.6–1.3)
EOSINOPHIL # BLD AUTO: 0.1 THOUSAND/ÂΜL (ref 0–0.61)
EOSINOPHIL NFR BLD AUTO: 2 % (ref 0–6)
ERYTHROCYTE [DISTWIDTH] IN BLOOD BY AUTOMATED COUNT: 13.2 % (ref 11.6–15.1)
FSH SERPL-ACNC: 32.3 MIU/ML
GFR SERPL CREATININE-BSD FRML MDRD: 103 ML/MIN/1.73SQ M
GLUCOSE P FAST SERPL-MCNC: 80 MG/DL (ref 65–99)
HCT VFR BLD AUTO: 40.2 % (ref 34.8–46.1)
HDLC SERPL-MCNC: 52 MG/DL
HGB BLD-MCNC: 13 G/DL (ref 11.5–15.4)
IMM GRANULOCYTES # BLD AUTO: 0.01 THOUSAND/UL (ref 0–0.2)
IMM GRANULOCYTES NFR BLD AUTO: 0 % (ref 0–2)
LDLC SERPL CALC-MCNC: 118 MG/DL (ref 0–100)
LH SERPL-ACNC: 26.1 MIU/ML
LYMPHOCYTES # BLD AUTO: 2.54 THOUSANDS/ÂΜL (ref 0.6–4.47)
LYMPHOCYTES NFR BLD AUTO: 41 % (ref 14–44)
MCH RBC QN AUTO: 29.6 PG (ref 26.8–34.3)
MCHC RBC AUTO-ENTMCNC: 32.3 G/DL (ref 31.4–37.4)
MCV RBC AUTO: 92 FL (ref 82–98)
MONOCYTES # BLD AUTO: 0.3 THOUSAND/ÂΜL (ref 0.17–1.22)
MONOCYTES NFR BLD AUTO: 5 % (ref 4–12)
NEUTROPHILS # BLD AUTO: 3.15 THOUSANDS/ÂΜL (ref 1.85–7.62)
NEUTS SEG NFR BLD AUTO: 51 % (ref 43–75)
NRBC BLD AUTO-RTO: 0 /100 WBCS
PLATELET # BLD AUTO: 268 THOUSANDS/UL (ref 149–390)
PMV BLD AUTO: 11.2 FL (ref 8.9–12.7)
POTASSIUM SERPL-SCNC: 4.7 MMOL/L (ref 3.5–5.3)
PROT SERPL-MCNC: 7.3 G/DL (ref 6.4–8.4)
RBC # BLD AUTO: 4.39 MILLION/UL (ref 3.81–5.12)
SODIUM SERPL-SCNC: 140 MMOL/L (ref 135–147)
TRIGL SERPL-MCNC: 112 MG/DL
TSH SERPL DL<=0.05 MIU/L-ACNC: 1.78 UIU/ML (ref 0.45–4.5)
WBC # BLD AUTO: 6.13 THOUSAND/UL (ref 4.31–10.16)

## 2023-12-04 PROCEDURE — 83001 ASSAY OF GONADOTROPIN (FSH): CPT

## 2023-12-04 PROCEDURE — 83002 ASSAY OF GONADOTROPIN (LH): CPT

## 2023-12-04 PROCEDURE — 85025 COMPLETE CBC W/AUTO DIFF WBC: CPT

## 2023-12-04 PROCEDURE — 80053 COMPREHEN METABOLIC PANEL: CPT

## 2023-12-04 PROCEDURE — 82306 VITAMIN D 25 HYDROXY: CPT

## 2023-12-04 PROCEDURE — 80061 LIPID PANEL: CPT

## 2023-12-04 PROCEDURE — 36415 COLL VENOUS BLD VENIPUNCTURE: CPT

## 2023-12-04 PROCEDURE — 84443 ASSAY THYROID STIM HORMONE: CPT

## 2023-12-27 ENCOUNTER — OFFICE VISIT (OUTPATIENT)
Dept: FAMILY MEDICINE CLINIC | Facility: CLINIC | Age: 48
End: 2023-12-27
Payer: COMMERCIAL

## 2023-12-27 VITALS
TEMPERATURE: 98.3 F | HEART RATE: 70 BPM | SYSTOLIC BLOOD PRESSURE: 112 MMHG | BODY MASS INDEX: 35.3 KG/M2 | HEIGHT: 61 IN | DIASTOLIC BLOOD PRESSURE: 80 MMHG | OXYGEN SATURATION: 99 % | WEIGHT: 187 LBS

## 2023-12-27 DIAGNOSIS — F41.9 ANXIETY: ICD-10-CM

## 2023-12-27 DIAGNOSIS — E55.9 VITAMIN D DEFICIENCY: ICD-10-CM

## 2023-12-27 DIAGNOSIS — E66.9 CLASS 2 OBESITY WITH BODY MASS INDEX (BMI) OF 35.0 TO 35.9 IN ADULT, UNSPECIFIED OBESITY TYPE, UNSPECIFIED WHETHER SERIOUS COMORBIDITY PRESENT: ICD-10-CM

## 2023-12-27 DIAGNOSIS — Z13.1 DIABETES MELLITUS SCREENING: ICD-10-CM

## 2023-12-27 DIAGNOSIS — Z13.220 LIPID SCREENING: ICD-10-CM

## 2023-12-27 DIAGNOSIS — Z00.00 ANNUAL PHYSICAL EXAM: Primary | ICD-10-CM

## 2023-12-27 PROCEDURE — 99396 PREV VISIT EST AGE 40-64: CPT | Performed by: PHYSICIAN ASSISTANT

## 2023-12-27 RX ORDER — QUETIAPINE FUMARATE 50 MG/1
150 TABLET, FILM COATED ORAL
COMMUNITY

## 2023-12-27 NOTE — PROGRESS NOTES
ADULT ANNUAL PHYSICAL  [facility] PRIMARY CARE    NAME: Hayley Zamora  AGE: 48 y.o. SEX: female  : 1975     DATE: 2024     Assessment and Plan:     Problem List Items Addressed This Visit          Other    Anxiety     Controlled.  Continue with psychiatry on regimen as directed by them.         Relevant Orders    TSH, 3rd generation with Free T4 reflex    Vitamin D deficiency     Recheck with labs as ordered.         Relevant Orders    Vitamin D 25 hydroxy    Obesity (BMI 30.0-34.9)     BMI Counseling: Body mass index is 35.33 kg/m². The BMI is above normal. Nutrition recommendations include decreasing overall calorie intake and 3-5 servings of fruits/vegetables daily. Exercise recommendations include moderate aerobic physical activity for 150 minutes/week and exercising 3-5 times per week.            Other Visit Diagnoses       Annual physical exam    -  Primary    Diabetes mellitus screening        Relevant Orders    Comprehensive metabolic panel    Lipid screening        Relevant Orders    Lipid Panel with Direct LDL reflex              Immunizations and preventive care screenings were discussed with patient today. Appropriate education was printed on patient's after visit summary.    Counseling:  Exercise: the importance of regular exercise/physical activity was discussed. Recommend exercise 3-5 times per week for at least 30 minutes.       Depression Screening and Follow-up Plan: Patient was screened for depression during today's encounter. They screened negative with a PHQ-2 score of 0.        Return in about 1 year (around 2024) for Annual physical.     Chief Complaint:     Chief Complaint   Patient presents with    Physical Exam      History of Present Illness:     Adult Annual Physical   Patient here for a comprehensive physical exam. The patient reports problems - as below .    Notes that she goes on treadmill 40 minutes daily and has  been diet has been good with no processed items - concerned since weight has increased 9 pounds since the Seroquel was increased - drinks water and seltzer or unsweetened iced tea         Diet and Physical Activity  Diet/Nutrition:  as above .   Exercise:  as above .      Depression Screening  PHQ-2/9 Depression Screening    Little interest or pleasure in doing things: 0 - not at all  Feeling down, depressed, or hopeless: 0 - not at all  PHQ-2 Score: 0  PHQ-2 Interpretation: Negative depression screen       General Health  Sleep:  6-8 hours a night .   Hearing: normal - bilateral.  Vision: goes for regular eye exams.   Dental: regular dental visits.       /GYN Health  Follows with gynecology? yes   Patient is: perimenopausal  Last menstrual period: 11/20/23      Advanced Care Planning  Do you have an advanced directive? no  Do you have a durable medical power of ? no     Review of Systems:     Review of Systems   Constitutional:  Negative for chills and fever.   HENT:  Positive for ear pain (resolved), rhinorrhea (resolved) and sore throat (resolved). Negative for congestion.    Eyes:  Negative for visual disturbance.   Respiratory:  Negative for cough, shortness of breath and wheezing.    Cardiovascular:  Negative for chest pain, palpitations and leg swelling.   Gastrointestinal:  Negative for abdominal pain, blood in stool, constipation, diarrhea, nausea and vomiting.   Genitourinary:  Negative for dysuria and frequency.   Musculoskeletal:  Negative for arthralgias and myalgias.   Skin:  Negative for rash.   Neurological:  Negative for dizziness, syncope and headaches.   Hematological:  Does not bruise/bleed easily.   Psychiatric/Behavioral:  Negative for dysphoric mood. The patient is not nervous/anxious.       Past Medical History:     Past Medical History:   Diagnosis Date    Acute sinusitis     Anxiety     Dense breast tissue     History of early menarche     AGE 11    IBS (irritable bowel syndrome)      OCD (obsessive compulsive disorder)     Panic attacks     Pilonidal cyst     PONV (postoperative nausea and vomiting)     Seasonal allergies     Sinus problem     Spontaneous      Vitamin D deficiency       Past Surgical History:     Past Surgical History:   Procedure Laterality Date    BREAST CYST EXCISION Right 2001    benign    BREAST SURGERY      EXCISION OF BREAST SINGLE LESION     DILATION AND CURETTAGE, DIAGNOSTIC / THERAPEUTIC      MOUTH SURGERY      WISDOM TEETH 1996    OTHER SURGICAL HISTORY      PILONIDAL CYST RESECTION    SKIN BIOPSY        Social History:     Social History     Socioeconomic History    Marital status: /Civil Union     Spouse name: None    Number of children: None    Years of education: None    Highest education level: None   Occupational History    None   Tobacco Use    Smoking status: Never    Smokeless tobacco: Never   Vaping Use    Vaping status: Never Used   Substance and Sexual Activity    Alcohol use: No    Drug use: No    Sexual activity: Yes     Partners: Male   Other Topics Concern    None   Social History Narrative    ALWAYS USES SEAT BELT     DAILY CAFFEINE CONSUMPTION      Social Determinants of Health     Financial Resource Strain: Not on file   Food Insecurity: Not on file   Transportation Needs: Not on file   Physical Activity: Not on file   Stress: Not on file   Social Connections: Not on file   Intimate Partner Violence: Not on file   Housing Stability: Not on file      Family History:     Family History   Problem Relation Age of Onset    Colon cancer Mother         AGE 58     Diabetes Mother     Gallbladder disease Mother     Hypertension Mother     Irritable bowel syndrome Mother     Kidney disease Mother         RECURRENT    Hypertension Father     Atrial fibrillation Father     Melanoma Brother         AGE 43    No Known Problems Daughter     No Known Problems Daughter     Breast cancer Maternal Grandmother         AGE 46     Other  "Maternal Grandfather         black lung    Stomach cancer Paternal Grandmother         AGE 46     Ovarian cancer Paternal Grandmother         AGE 46     Coronary artery disease Paternal Grandfather         massive MI    No Known Problems Maternal Aunt     Pancreatic cancer Maternal Uncle         UNSPECIFIED LOACTION OF MALIGNANCY     No Known Problems Maternal Uncle     No Known Problems Paternal Aunt     No Known Problems Paternal Aunt     Coronary artery disease Paternal Uncle         MI    No Known Problems Paternal Uncle     No Known Problems Paternal Uncle       Current Medications:     Current Outpatient Medications   Medication Sig Dispense Refill    cetirizine (ZyrTEC) 10 mg tablet Take 10 mg by mouth daily      Cholecalciferol (Vitamin D3) 125 MCG (5000 UT) TABS Take 6,000 Units by mouth daily at bedtime      clonazePAM (KlonoPIN) 0.5 mg tablet Take 0.5 mg by mouth daily      FLUoxetine (PROzac) 40 MG capsule Take 40 mg by mouth daily      fluticasone (FLONASE) 50 mcg/act nasal spray 1 spray into each nostril as needed for rhinitis      multivitamin (THERAGRAN) TABS Take 1 tablet by mouth daily      QUEtiapine (SEROquel) 50 mg tablet Take 150 mg by mouth daily at bedtime       No current facility-administered medications for this visit.      Allergies:     Allergies   Allergen Reactions    Ibuprofen Hives    Quetiapine Hives     Annotation - [id number]: May have extended release Seraquil    Sulfa Antibiotics Hives and Rash    Sulfamethoxazole-Trimethoprim Hives and Rash      Physical Exam:     /80 (BP Location: Left arm, Patient Position: Sitting, Cuff Size: Large)   Pulse 70   Temp 98.3 °F (36.8 °C) (Tympanic)   Ht 5' 1\" (1.549 m)   Wt 84.8 kg (187 lb)   SpO2 99%   BMI 35.33 kg/m²     Physical Exam  Vitals reviewed.   Constitutional:       General: She is not in acute distress.     Appearance: Normal appearance. She is well-developed. She is obese. She is not ill-appearing.   HENT:      Head: " Normocephalic and atraumatic.      Right Ear: Tympanic membrane, ear canal and external ear normal.      Left Ear: Tympanic membrane, ear canal and external ear normal.      Nose: Nose normal.      Mouth/Throat:      Pharynx: No oropharyngeal exudate.   Eyes:      Conjunctiva/sclera: Conjunctivae normal.      Pupils: Pupils are equal, round, and reactive to light.   Neck:      Thyroid: No thyromegaly.   Cardiovascular:      Rate and Rhythm: Normal rate and regular rhythm.      Pulses: Normal pulses.      Heart sounds: Normal heart sounds. No murmur heard.  Pulmonary:      Effort: Pulmonary effort is normal.      Breath sounds: Normal breath sounds. No wheezing or rales.   Abdominal:      General: Bowel sounds are normal. There is no distension.      Palpations: Abdomen is soft. There is no mass.      Tenderness: There is no abdominal tenderness.   Musculoskeletal:      Cervical back: Normal range of motion and neck supple.      Right lower leg: No edema.      Left lower leg: No edema.   Lymphadenopathy:      Cervical: No cervical adenopathy.   Skin:     General: Skin is warm and dry.      Findings: No rash.   Neurological:      Mental Status: She is alert.      Sensory: No sensory deficit.      Comments: 5/5 strength in UE and LE   Psychiatric:         Behavior: Behavior normal.          Hien Garcia PA-C  [facility] PRIMARY CARE

## 2024-01-01 NOTE — ASSESSMENT & PLAN NOTE
BMI Counseling: Body mass index is 35.33 kg/m². The BMI is above normal. Nutrition recommendations include decreasing overall calorie intake and 3-5 servings of fruits/vegetables daily. Exercise recommendations include moderate aerobic physical activity for 150 minutes/week and exercising 3-5 times per week.

## 2024-02-07 ENCOUNTER — AMB VIDEO VISIT (OUTPATIENT)
Dept: OTHER | Facility: HOSPITAL | Age: 49
End: 2024-02-07

## 2024-02-07 VITALS — RESPIRATION RATE: 14 BRPM | HEART RATE: 76 BPM

## 2024-02-07 DIAGNOSIS — H00.014 HORDEOLUM EXTERNUM LEFT UPPER EYELID: Primary | ICD-10-CM

## 2024-02-07 PROBLEM — Z12.31 SCREENING MAMMOGRAM, ENCOUNTER FOR: Status: RESOLVED | Noted: 2018-07-23 | Resolved: 2024-02-07

## 2024-02-07 PROBLEM — Z12.39 BREAST CANCER SCREENING OTHER THAN MAMMOGRAM: Status: RESOLVED | Noted: 2018-07-23 | Resolved: 2024-02-07

## 2024-02-07 PROCEDURE — ECARE PR SL URGENT CARE VIRTUAL VISIT: Performed by: PHYSICIAN ASSISTANT

## 2024-02-07 NOTE — PATIENT INSTRUCTIONS
[facility] Phone number is [phone] if you need assistance or have further questions    Stye   WHAT YOU NEED TO KNOW:   A stye is a lump on the edge or inside of your upper or lower eyelid caused by an infection. A stye usually starts to get better within 1 week and is often gone within 2 weeks.       DISCHARGE INSTRUCTIONS:   Call your doctor if:   You have redness and discharge around your eye, and your eye pain is getting worse.    Your vision changes.    The stye has not gone away within 2 weeks.    The stye comes back within a short period of time after treatment.    You have questions or concerns about your condition or care.    Medicines:   Antibiotic medicine  is given as an ointment to put into your eye. It is used to fight an infection caused by bacteria. Use as directed.    Take your medicine as directed.  Contact your healthcare provider if you think your medicine is not helping or if you have side effects. Tell your provider if you are allergic to any medicine. Keep a list of the medicines, vitamins, and herbs you take. Include the amounts, and when and why you take them. Bring the list or the pill bottles to follow-up visits. Carry your medicine list with you in case of an emergency.    Manage a stye:   Use warm compresses, as directed.  This will help decrease swelling and pain. Wet a clean washcloth with warm water and place it on your eye for 10 to 15 minutes, 3 to 4 times each day, or as directed.    Keep your hands away from your eye.  This helps to prevent the spread of infection to other parts of the eye. Wash your hands often with soap and dry with a clean towel. Do not squeeze the stye.    Do not wear eye makeup while you have a stye.  Eye makeup may carry bacteria and cause another stye. Throw away eye makeup and brushes used to apply the makeup. Use new eye makeup after the stye has gone away. Do not share eye makeup with others.    Prevent another stye:  Wash your face and clean your  eyelashes every day. Remove eye makeup with makeup remover. This helps to remove eye makeup without heavy rubbing.  Follow up with your doctor as directed:  Write down your questions so you remember to ask them during your visits.  © Copyright [employer] 2023 Information is for End User's use only and may not be sold, redistributed or otherwise used for commercial purposes.  The above information is an  only. It is not intended as medical advice for individual conditions or treatments. Talk to your doctor, nurse or pharmacist before following any medical regimen to see if it is safe and effective for you.

## 2024-02-07 NOTE — PROGRESS NOTES
Required Documentation:  Encounter provider Shannon D Severino, PA-C    Provider located at [facility]  VIRTUAL CARE   [address] [facility] [id number]    Identify all parties in room with patient during virtual visit:  No one else    The patient was identified by name and date of birth. Hayley Zamora was informed that this is a telemedicine visit and that the visit is being conducted through the [facility] [employer] Out[employer] platform. She agrees to proceed..  My office door was closed. No one else was in the room.  She acknowledged consent and understanding of privacy and security of the video platform. The patient has agreed to participate and understands they can discontinue the visit at any time.    Verification of patient location:    Patient is located at Home in the following state in which I hold an active license [place]    Patient is aware this is a billable service.     Reason for visit is No chief complaint on file.       Subjective  HPI   Pt reports she woke up in the middle of night with sore L upper eyelid. Today noticed a little acne on eyelid. Reports it is sore. Tried hot compress with some relief. Denies changes to vision. Wears glasses, not contacts. No eye injury    Past Medical History:   Diagnosis Date    Acute sinusitis     Anxiety     Dense breast tissue     History of early menarche     AGE 11    IBS (irritable bowel syndrome)     OCD (obsessive compulsive disorder)     Panic attacks     Pilonidal cyst     PONV (postoperative nausea and vomiting)     Seasonal allergies     Sinus problem     Spontaneous      Vitamin D deficiency        Past Surgical History:   Procedure Laterality Date    BREAST CYST EXCISION Right     benign    BREAST SURGERY      EXCISION OF BREAST SINGLE LESION     DILATION AND CURETTAGE, DIAGNOSTIC / THERAPEUTIC      MOUTH SURGERY      WISDOM TEETH 1996    OTHER SURGICAL HISTORY      PILONIDAL CYST RESECTION     SKIN BIOPSY          Allergies   Allergen Reactions    Ibuprofen Hives    Quetiapine Hives     Annotation - [id number]: May have extended release Seraquil    Sulfa Antibiotics Hives and Rash    Sulfamethoxazole-Trimethoprim Hives and Rash       Review of Systems   Constitutional:  Negative for fever.   HENT:  Negative for nosebleeds.    Eyes:  Positive for pain and redness.   Respiratory:  Negative for shortness of breath.    Cardiovascular:  Negative for chest pain.   Gastrointestinal:  Negative for blood in stool.   Genitourinary:  Negative for hematuria.   Musculoskeletal:  Negative for gait problem.   Skin:  Negative for rash.   Neurological:  Negative for seizures.   Psychiatric/Behavioral:  Negative for behavioral problems.        Video Exam    Vitals:    02/07/24 1113   Pulse: 76   Resp: 14       Physical Exam  Constitutional:       General: She is not in acute distress.     Appearance: Normal appearance. She is not toxic-appearing.   HENT:      Head: Normocephalic and atraumatic.      Nose: No rhinorrhea.      Mouth/Throat:      Mouth: Mucous membranes are moist.   Eyes:      General:         Left eye: Hordeolum (left upper medial lid with external hordeolum, mild) present.     Conjunctiva/sclera: Conjunctivae normal.      Comments: Mild erythema and swelling of left upper lid best appreciated medially   Pulmonary:      Effort: Pulmonary effort is normal. No respiratory distress.      Breath sounds: No wheezing (no gross audible wheeze through computer).   Musculoskeletal:      Cervical back: Normal range of motion.   Skin:     Findings: No rash (on face or neck).   Neurological:      Mental Status: She is alert.      Cranial Nerves: No dysarthria or facial asymmetry.   Psychiatric:         Mood and Affect: Mood normal.         Behavior: Behavior normal.         Visit Time  Total Visit Duration: 6 minutes    Assessment/Plan:    Diagnoses and all orders for this visit:    Hordeolum externum left upper  eyelid        Patient Instructions   [facility] Phone number is [phone] if you need assistance or have further questions    Stye   WHAT YOU NEED TO KNOW:   A stye is a lump on the edge or inside of your upper or lower eyelid caused by an infection. A stye usually starts to get better within 1 week and is often gone within 2 weeks.       DISCHARGE INSTRUCTIONS:   Call your doctor if:   You have redness and discharge around your eye, and your eye pain is getting worse.    Your vision changes.    The stye has not gone away within 2 weeks.    The stye comes back within a short period of time after treatment.    You have questions or concerns about your condition or care.    Medicines:   Antibiotic medicine  is given as an ointment to put into your eye. It is used to fight an infection caused by bacteria. Use as directed.    Take your medicine as directed.  Contact your healthcare provider if you think your medicine is not helping or if you have side effects. Tell your provider if you are allergic to any medicine. Keep a list of the medicines, vitamins, and herbs you take. Include the amounts, and when and why you take them. Bring the list or the pill bottles to follow-up visits. Carry your medicine list with you in case of an emergency.    Manage a stye:   Use warm compresses, as directed.  This will help decrease swelling and pain. Wet a clean washcloth with warm water and place it on your eye for 10 to 15 minutes, 3 to 4 times each day, or as directed.    Keep your hands away from your eye.  This helps to prevent the spread of infection to other parts of the eye. Wash your hands often with soap and dry with a clean towel. Do not squeeze the stye.    Do not wear eye makeup while you have a stye.  Eye makeup may carry bacteria and cause another stye. Throw away eye makeup and brushes used to apply the makeup. Use new eye makeup after the stye has gone away. Do not share eye makeup with others.    Prevent another  stye:  Wash your face and clean your eyelashes every day. Remove eye makeup with makeup remover. This helps to remove eye makeup without heavy rubbing.  Follow up with your doctor as directed:  Write down your questions so you remember to ask them during your visits.  © Copyright [employer] 2023 Information is for End User's use only and may not be sold, redistributed or otherwise used for commercial purposes.  The above information is an  only. It is not intended as medical advice for individual conditions or treatments. Talk to your doctor, nurse or pharmacist before following any medical regimen to see if it is safe and effective for you.

## 2024-02-13 NOTE — PROGRESS NOTES
"Weight Management Medical Nutrition Assessment  Hayley is here for meal planning. Completed Healthy Core in 2017. Current wt: 184 lbs. She has maintained a loss of 58 lbs from her highest weight. Had been down to the 150's but acknowledges she did not feel great there. She has been on seroquel since 2001 and recently had an increase in dose which she feels caused some weight gain. Now working on reducing dose again. Food recall reveals likely inadequate calorie intake, especially as she is walking on the treadmill most days. She would like to utilize meal replacements as part of her plan. Meal plan and other resources provided. Options for f/u reviewed. She will call to schedule f/u.     Dislikes: n/a    Patient seen by Medical Provider in past 6 months:  no  Requested to schedule appointment with Medical Provider: No      Anthropometric Measurements  Start Weight (#): 184 lbs 2/19/24  Current Weight (#): -  TBW % Change from start weight:-  Ideal Body Weight (#):110 lbs 62\" (BMI 25 134.1 lbs)  Goal Weight (#):160's  Highest: 242 lbs  Lowest:    Weight Loss History  Previous weight loss attempts: Counseling with  MD  Exercise  High Protein/Low CHO diets (Atkins, [place], etc.)  Meal Replacements (Medifast, Slim Fast, etc.)  Nutrition Counseling with RD  Self Created Diets (Portion Control, Healthy Food Choices, etc.)    Food and Nutrition Related History  Wake up: 6:30   Bed Time:11-12    Food Recall  Breakfast:10:00: atkins protein shake OR greek yogurt w/granola or cereal     Snack:skip  Lunch:2:00: atkins protein shake, coffee  Snack:skip  Dinner: 7:15: homemade soup (beans, vegetables, turkey meatballs) OR ~3 oz protein, ~1 cup veggies, sometimes carb  Snack:1-2 pieces toast    Beverages: water and coffee, seltzer water, unsweetened tea  Volume of beverage intake: 80 oz water, 1-2 cups coffee    Weekends: Same  Cravings: sometimes carb  Trouble area of day:night    Frequency of Eating out: irregularly  Food " restrictions:n/a  Cooking: self   Food Shopping: self    Physical Activity Intake  Activity:Walking, resistance bands, floor exercises  Frequency: 30 minutes most days  Physical limitations/barriers to exercise: n/a    Estimated Needs  Energy  SECA: BMR:n/a      X 1.3 -1000 =  Maddi Welch Energy Needs: BMR : 1413   1-1.5# loss weekly sedentary:  945-1195             1-1.5# loss weekly lightly active:[id number]  Maintenance calories for sedentary activity level: 1695  Protein:60-75 gm      (1.2-1.5g/kg IBW)  Fluid: 58 oz     (35mL/kg IBW)    Nutrition Diagnosis  Yes;    Overweight/obesity  related to Excess energy intake as evidenced by  BMI more than normative standard for age and sex (obesity-grade I 30-34.9)       Nutrition Intervention    Nutrition Prescription  Calories:2355-1085  Protein: gm  Carbs:  gm    Meal Plan (Brain/Pro/Carb)  Breakfast: 200, 27, 10  Snack: 150, 5-10, 10-20  Lunch: 200-275, 27, 10-25  Snack: 100-150, 5-10, 10-20  Dinner: 300-350, 20, 25-30  Snack: 100-150, 5-10, 10-20    Nutrition Education:    Healthy Core Manual  Calorie controlled menu  Lean protein food choices  Healthy snack options  Food journaling tips      Nutrition Counseling:  Strategies: meal planning, portion sizes, healthy snack choices, hydration, fiber intake, protein intake, exercise, food journal      Monitoring and Evaluation:  Evaluation criteria:  Energy Intake  Meet protein needs  Maintain adequate hydration  Monitor weekly weight  Meal planning/preparation  Food journal   Decreased portions at mealtimes and snacks  Physical activity     Barriers to learning:none  Readiness to change: Preparation:  (Getting ready to change)   Comprehension: very good  Expected Compliance: very good

## 2024-02-19 ENCOUNTER — CLINICAL SUPPORT (OUTPATIENT)
Dept: BARIATRICS | Facility: CLINIC | Age: 49
End: 2024-02-19
Payer: COMMERCIAL

## 2024-02-19 VITALS — WEIGHT: 184 LBS | HEIGHT: 62 IN | BODY MASS INDEX: 33.86 KG/M2

## 2024-02-19 DIAGNOSIS — E66.9 CLASS 2 OBESITY WITH BODY MASS INDEX (BMI) OF 35.0 TO 35.9 IN ADULT, UNSPECIFIED OBESITY TYPE, UNSPECIFIED WHETHER SERIOUS COMORBIDITY PRESENT: ICD-10-CM

## 2024-02-19 PROCEDURE — WMDI30

## 2024-02-19 PROCEDURE — RECHECK

## 2024-04-23 ENCOUNTER — HOSPITAL ENCOUNTER (OUTPATIENT)
Dept: ULTRASOUND IMAGING | Facility: CLINIC | Age: 49
Discharge: HOME | End: 2024-04-23
Payer: COMMERCIAL

## 2024-04-23 VITALS — BODY MASS INDEX: 33.86 KG/M2 | WEIGHT: 184 LBS | HEIGHT: 62 IN

## 2024-04-23 DIAGNOSIS — Z12.39 BREAST CANCER SCREENING OTHER THAN MAMMOGRAM: ICD-10-CM

## 2024-04-23 DIAGNOSIS — R92.30 DENSE BREAST TISSUE: ICD-10-CM

## 2024-04-23 PROCEDURE — 76641 ULTRASOUND BREAST COMPLETE: CPT

## 2024-06-25 ENCOUNTER — TELEPHONE (OUTPATIENT)
Dept: GASTROENTEROLOGY | Facility: MEDICAL CENTER | Age: 49
End: 2024-06-25

## 2024-06-26 NOTE — TELEPHONE ENCOUNTER
Patient called in she seen message Repeat colonoscopy in 5 years Family history of colon cancer is what was recommended she would like clarification on this as she had a Colonoscopy in 2023  [phone] please call

## 2024-06-26 NOTE — TELEPHONE ENCOUNTER
Spoke to pt clarify with her she just had colonoscopy completed 2/13/ 2023 .'s recommendation is to have her repeated in  5 years 2/13/2028 due to family hx colon cancer -recalled was placed she is aware closer to that date the  will notified through my chart  or phone call regarding repeating colonoscopy

## 2024-08-15 ENCOUNTER — HOSPITAL ENCOUNTER (OUTPATIENT)
Dept: MAMMOGRAPHY | Facility: MEDICAL CENTER | Age: 49
Discharge: HOME | End: 2024-08-15
Payer: COMMERCIAL

## 2024-08-15 VITALS — HEIGHT: 62 IN | WEIGHT: 184 LBS | BODY MASS INDEX: 33.86 KG/M2

## 2024-08-15 DIAGNOSIS — Z12.31 SCREENING MAMMOGRAM, ENCOUNTER FOR: ICD-10-CM

## 2024-08-15 PROCEDURE — 77063 BREAST TOMOSYNTHESIS BI: CPT

## 2024-08-15 PROCEDURE — 77067 SCR MAMMO BI INCL CAD: CPT

## 2024-09-25 ENCOUNTER — ANNUAL EXAM (OUTPATIENT)
Dept: OBGYN CLINIC | Facility: CLINIC | Age: 49
End: 2024-09-25
Payer: COMMERCIAL

## 2024-09-25 VITALS — SYSTOLIC BLOOD PRESSURE: 114 MMHG | DIASTOLIC BLOOD PRESSURE: 70 MMHG

## 2024-09-25 DIAGNOSIS — Z12.31 ENCOUNTER FOR SCREENING MAMMOGRAM FOR BREAST CANCER: Primary | ICD-10-CM

## 2024-09-25 DIAGNOSIS — Z01.419 PAP SMEAR, AS PART OF ROUTINE GYNECOLOGICAL EXAMINATION: ICD-10-CM

## 2024-09-25 DIAGNOSIS — Z01.419 ENCOUNTER FOR GYNECOLOGICAL EXAMINATION WITHOUT ABNORMAL FINDING: ICD-10-CM

## 2024-09-25 DIAGNOSIS — N95.2 ATROPHIC VAGINITIS: ICD-10-CM

## 2024-09-25 PROCEDURE — S0612 ANNUAL GYNECOLOGICAL EXAMINA: HCPCS | Performed by: OBSTETRICS & GYNECOLOGY

## 2024-09-25 NOTE — PROGRESS NOTES
Assessment/Plan:    No problem-specific Assessment & Plan notes found for this encounter.       Diagnoses and all orders for this visit:    Encounter for screening mammogram for breast cancer  -     Mammo screening bilateral w 3d and cad; Future    Encounter for gynecological examination without abnormal finding  -     Thinprep Tis and HPV mRNA E6/E7    Pap smear, as part of routine gynecological examination    Atrophic vaginitis          Normal gynecological physical examination.  Self-breast examination stressed.  Mammogram ordered.  Discussed regular exercise, healthy diet, importance of vitamin D and calcium supplements.  Discussed importance of sun block use during periods of prolonged sun exposure.  Patient will be seen in 1 year for routine gynecologic and medical examination.  Patient will call office for any problems, concerns, or issues which may arise during the interim.     Subjective: Patient is a 49 y.o. female who presents to the office for her annual GYN exam with concerns of persistent weight gain. Patient reports that she has gained 20-30 lbs over the last two years despite reaching out to work with weight management, watching her diet, and remaining active. Patient denies intolerance to the heat or cold, thinning of her hair, or dry skin. Patient is due to see her PCP in December for her annual exam and future blood work orders are present for further evaluation at that time.     Patient reports that her LMP was 11/2023; however, she states that she began spotting on Sunday 9/22. She reports that she is still noting minor spotting when she wipes and had associated breast tenderness before the spotting began. Patient denies any cramping, abdominal or pelvic pain.          HPI    Patient ID: Hayley Zamora is a 49 y.o. female who presents today for her annual gynecologic and medical examination    Menstrual status: Patient reports that her LMP was 11/2023; however, she states that she began spotting  on Sunday 9/22.    Vasomotor symptoms: Denies vasomotor symptoms.     Patient reports normal appetite    Patient reports normal bowel and bladder habits    Patient denies any significant pelvic or abdominal pain    Patient denies any headaches, chest pain, shortness of breath fever shakes or chills    Patient denies any COVID 19 symptoms including cough or loss of taste or smell    COVID vaccine status: Aware of COVID guidelines and recommendations.     Medical problems:Denies any changes to medical history.     Colonoscopy status: Up to date. Last was 2/23 and was told to follow-up in 5 years.     Mammogram status: Up to date. Last mammogram was 8/24.     The following portions of the patient's history were reviewed and updated as appropriate: allergies, current medications, past family history, past medical history, past social history, past surgical history and problem list.    Review of Systems   Constitutional: Negative.  Negative for appetite change, diaphoresis, fatigue, fever and unexpected weight change.   HENT: Negative.     Eyes: Negative.    Respiratory: Negative.  Negative for shortness of breath.    Cardiovascular: Negative.  Negative for chest pain.   Gastrointestinal: Negative.  Negative for abdominal pain, blood in stool, constipation, diarrhea, nausea and vomiting.   Endocrine: Negative.  Negative for cold intolerance, heat intolerance, polydipsia and polyphagia.   Genitourinary: Negative.  Negative for dysuria, frequency, hematuria, pelvic pain, urgency, vaginal bleeding, vaginal discharge and vaginal pain.   Musculoskeletal: Negative.    Skin: Negative.    Allergic/Immunologic: Negative.    Neurological: Negative.  Negative for headaches.   Hematological: Negative.  Negative for adenopathy.   Psychiatric/Behavioral: Negative.           Objective:      /70   LMP 11/23/2023 (Approximate)          Physical Exam  Exam conducted with a chaperone present.   Constitutional:       General: She is  not in acute distress.     Appearance: Normal appearance. She is well-developed. She is not diaphoretic.   HENT:      Head: Normocephalic and atraumatic.   Eyes:      Pupils: Pupils are equal, round, and reactive to light.   Cardiovascular:      Rate and Rhythm: Normal rate and regular rhythm.      Heart sounds: Normal heart sounds. No murmur heard.     No friction rub. No gallop.   Pulmonary:      Effort: Pulmonary effort is normal.      Breath sounds: Normal breath sounds.   Chest:   Breasts:     Breasts are symmetrical.      Right: No inverted nipple, mass, nipple discharge, skin change or tenderness.      Left: No inverted nipple, mass, nipple discharge, skin change or tenderness.      Comments: Bilateral fibrocystic changes noted.   Abdominal:      General: Bowel sounds are normal.      Palpations: Abdomen is soft.   Genitourinary:     General: Normal vulva.      Exam position: Supine.      Labia:         Right: No rash or lesion.         Left: No rash or lesion.       Vagina: Normal. No vaginal discharge, erythema, tenderness or bleeding.      Cervix: No discharge or friability.      Uterus: Not enlarged and not tender.       Adnexa:         Right: No mass, tenderness or fullness.          Left: No mass, tenderness or fullness.        Comments: Good pelvic support confirmed. Mid-position uterus noted.   Musculoskeletal:         General: Normal range of motion.      Cervical back: Normal range of motion and neck supple.   Lymphadenopathy:      Cervical: No cervical adenopathy.      Upper Body:      Right upper body: No supraclavicular adenopathy.      Left upper body: No supraclavicular adenopathy.   Skin:     General: Skin is warm and dry.      Findings: No rash.   Neurological:      General: No focal deficit present.      Mental Status: She is alert and oriented to person, place, and time.   Psychiatric:         Mood and Affect: Mood normal.         Speech: Speech normal.         Behavior: Behavior normal.          Thought Content: Thought content normal.         Judgment: Judgment normal.

## 2024-09-30 LAB
CLINICAL INFO: NORMAL
CYTO CVX: NORMAL
CYTOLOGY CMNT CVX/VAG CYTO-IMP: NORMAL
DATE PREVIOUS BX: NORMAL
HPV E6+E7 MRNA CVX QL NAA+PROBE: NOT DETECTED
LMP START DATE: NORMAL
SL AMB PREV. PAP:: NORMAL
SPECIMEN SOURCE CVX/VAG CYTO: NORMAL

## 2024-11-18 ENCOUNTER — OFFICE VISIT (OUTPATIENT)
Dept: SURGICAL ONCOLOGY | Facility: CLINIC | Age: 49
End: 2024-11-18
Payer: COMMERCIAL

## 2024-11-18 VITALS
OXYGEN SATURATION: 96 % | DIASTOLIC BLOOD PRESSURE: 78 MMHG | BODY MASS INDEX: 36.44 KG/M2 | WEIGHT: 198 LBS | SYSTOLIC BLOOD PRESSURE: 118 MMHG | HEART RATE: 77 BPM | TEMPERATURE: 97.9 F | HEIGHT: 62 IN

## 2024-11-18 DIAGNOSIS — Z13.71 BRCA NEGATIVE: ICD-10-CM

## 2024-11-18 DIAGNOSIS — Z12.39 BREAST CANCER SCREENING OTHER THAN MAMMOGRAM: ICD-10-CM

## 2024-11-18 DIAGNOSIS — R92.30 DENSE BREAST TISSUE: Primary | ICD-10-CM

## 2024-11-18 DIAGNOSIS — Z12.31 SCREENING MAMMOGRAM FOR BREAST CANCER: ICD-10-CM

## 2024-11-18 DIAGNOSIS — Z80.3 FAMILY HISTORY OF BREAST CANCER IN FEMALE: ICD-10-CM

## 2024-11-18 PROCEDURE — 99213 OFFICE O/P EST LOW 20 MIN: CPT | Performed by: SURGERY

## 2024-11-18 RX ORDER — FLUOXETINE HYDROCHLORIDE 60 MG/1
1 TABLET, FILM COATED ORAL; ORAL DAILY
COMMUNITY
Start: 2024-11-06

## 2024-11-18 NOTE — PROGRESS NOTES
Surgical Oncology Follow Up       [address]  [facility] [place]  [address]  [facility] [address]    Hayley Richarder  1975  [id number]  [address]  [facility] [place]  [address] [place] [address]    Chief Complaint   Patient presents with    Follow-up       Assessment & Plan   Diagnoses and all orders for this visit:    Dense breast tissue  -     US breast screening bilateral complete (ABUS); Future    Breast cancer screening other than mammogram  -     US breast screening bilateral complete (ABUS); Future    Screening mammogram for breast cancer  -     Mammo screening bilateral w 3d and cad; Future    Family history of breast cancer in female    BRCA negative    Other orders  -     FLUoxetine 60 MG TABS; Take 1 tablet by mouth in the morning        Advance Care Planning/Advance Directives:  Did not discuss  with the patient.     Oncology History:    Oncology History    No history exists.       History of Present Illness: Follow-up visit secondary to dense breast tissue, fibrocystic changes and family history of breast cancer, denies any breast referable concerns  -Interval History: Benign imaging    Review of Systems:  Review of Systems   Constitutional: Negative.  Negative for appetite change, fever and unexpected weight change.   HENT: Negative.  Negative for trouble swallowing.    Eyes: Negative.    Respiratory: Negative.  Negative for cough and shortness of breath.    Cardiovascular: Negative.  Negative for chest pain.   Gastrointestinal: Negative.  Negative for abdominal pain, nausea and vomiting.   Endocrine: Negative.    Genitourinary: Negative.  Negative for dysuria.   Musculoskeletal: Negative.  Negative for arthralgias and myalgias.   Skin: Negative.    Allergic/Immunologic: Negative.    Neurological: Negative.  Negative for headaches.   Hematological: Negative.  Negative for adenopathy. Does not  bruise/bleed easily.   Psychiatric/Behavioral: Negative.         Patient Active Problem List   Diagnosis    Anxiety    Dense breast tissue    IBS (irritable bowel syndrome)    OCD (obsessive compulsive disorder)    Panic attacks    Seasonal allergies    Vitamin D deficiency    Family history of breast cancer in female    Screening mammogram for breast cancer    Obesity (BMI 30.0-34.9)    Multiple nevi    Breast cancer screening other than mammogram     Past Medical History:   Diagnosis Date    Acute sinusitis     Anxiety     Dense breast tissue     History of early menarche     AGE 11    IBS (irritable bowel syndrome)     OCD (obsessive compulsive disorder)     Panic attacks     Pilonidal cyst     PONV (postoperative nausea and vomiting)     Seasonal allergies     Sinus problem     Spontaneous      Vitamin D deficiency      Past Surgical History:   Procedure Laterality Date    BREAST BIOPSY  2001    BREAST CYST EXCISION Right 2001    benign    BREAST SURGERY      EXCISION OF BREAST SINGLE LESION     DILATION AND CURETTAGE, DIAGNOSTIC / THERAPEUTIC      MOUTH SURGERY      WISDOM TEETH 1996    OTHER SURGICAL HISTORY      PILONIDAL CYST RESECTION    SKIN BIOPSY       Family History   Problem Relation Age of Onset    Colon cancer Mother         AGE 58     Diabetes Mother     Gallbladder disease Mother     Hypertension Mother     Irritable bowel syndrome Mother     Kidney disease Mother         RECURRENT    Ovarian cancer Mother         Results not known - ovaries and mass removed 2021    Osteoarthritis Mother     Thyroid disease Mother     Hypertension Father     Atrial fibrillation Father     Diabetes Father     Osteoporosis Father     Melanoma Brother         AGE 43    No Known Problems Daughter     No Known Problems Daughter     Breast cancer Maternal Grandmother         AGE 46     Other Maternal Grandfather         black lung    Stomach cancer Paternal Grandmother         AGE 46     Ovarian  cancer Paternal Grandmother         AGE 46     Breast cancer Paternal Grandmother     Coronary artery disease Paternal Grandfather         massive MI    No Known Problems Maternal Aunt     Pancreatic cancer Maternal Uncle         UNSPECIFIED LOACTION OF MALIGNANCY     No Known Problems Maternal Uncle     No Known Problems Paternal Aunt     No Known Problems Paternal Aunt     Coronary artery disease Paternal Uncle         MI    No Known Problems Paternal Uncle     No Known Problems Paternal Uncle      Social History     Socioeconomic History    Marital status: /Civil Union     Spouse name: Not on file    Number of children: Not on file    Years of education: Not on file    Highest education level: Not on file   Occupational History    Not on file   Tobacco Use    Smoking status: Never    Smokeless tobacco: Never   Vaping Use    Vaping status: Never Used   Substance and Sexual Activity    Alcohol use: No    Drug use: Never    Sexual activity: Not Currently     Partners: Male     Birth control/protection: Abstinence, None   Other Topics Concern    Not on file   Social History Narrative    ALWAYS USES SEAT BELT     DAILY CAFFEINE CONSUMPTION      Social Drivers of Health     Financial Resource Strain: Not on file   Food Insecurity: Not on file   Transportation Needs: Not on file   Physical Activity: Not on file   Stress: Not on file   Social Connections: Not on file   Intimate Partner Violence: Not on file   Housing Stability: Not on file       Current Outpatient Medications:     cetirizine (ZyrTEC) 10 mg tablet, Take 10 mg by mouth daily, Disp: , Rfl:     Cholecalciferol (Vitamin D3) 125 MCG (5000 UT) TABS, Take 6,000 Units by mouth daily at bedtime, Disp: , Rfl:     clonazePAM (KlonoPIN) 0.5 mg tablet, Take 0.5 mg by mouth daily, Disp: , Rfl:     FLUoxetine 60 MG TABS, Take 1 tablet by mouth in the morning, Disp: , Rfl:     fluticasone (FLONASE) 50 mcg/act nasal spray, 1 spray into each nostril as needed for  rhinitis, Disp: , Rfl:     multivitamin (THERAGRAN) TABS, Take 1 tablet by mouth daily, Disp: , Rfl:     QUEtiapine (SEROquel) 50 mg tablet, Take 150 mg by mouth daily at bedtime, Disp: , Rfl:   Allergies   Allergen Reactions    Ibuprofen Hives    Quetiapine Hives     Annotation - [id number]: May have extended release Seraquil    Sulfa Antibiotics Hives and Rash    Sulfamethoxazole-Trimethoprim Hives and Rash       The following portions of the patient's history were reviewed and updated as appropriate: allergies, current medications, past family history, past medical history, past social history, past surgical history, and problem list.        Vitals:    11/18/24 1544   BP: 118/78   Pulse: 77   Temp: 97.9 °F (36.6 °C)   SpO2: 96%       Physical Exam  Constitutional:       General: She is not in acute distress.     Appearance: Normal appearance. She is well-developed.   HENT:      Head: Normocephalic and atraumatic.   Chest:   Breasts:     Right: No swelling, bleeding, inverted nipple, mass, nipple discharge, skin change or tenderness.      Left: No swelling, bleeding, inverted nipple, mass, nipple discharge, skin change or tenderness.   Musculoskeletal:      Right lower leg: No edema.      Left lower leg: No edema.   Lymphadenopathy:      Upper Body:      Right upper body: No supraclavicular, axillary or pectoral adenopathy.      Left upper body: No supraclavicular, axillary or pectoral adenopathy.   Neurological:      Mental Status: She is alert and oriented to person, place, and time.   Psychiatric:         Mood and Affect: Mood normal.           Results:  Labs:      Imaging  4/23/2024 automated breast ultrasound was benign    8/15/2024 bilateral 3D screening mammogram was benign densities of 3    I reviewed the above imaging data.    Discussion/Summary: 57-year-old female with dense breast tissue, fibrocystic changes and family history of breast cancer.  There are no concerns on exam today.  Her prior genetic  testing was benign/negative.  No concerns on any of her imaging.  I will continue to order both image modalities for her.  I will try to stagger them every 6 months.  I will see her again in 1 year for an exam or sooner should the need arise.

## 2024-12-18 PROBLEM — Z12.39 BREAST CANCER SCREENING OTHER THAN MAMMOGRAM: Status: RESOLVED | Noted: 2024-11-18 | Resolved: 2024-12-18

## 2024-12-30 ENCOUNTER — APPOINTMENT (OUTPATIENT)
Dept: LAB | Facility: MEDICAL CENTER | Age: 49
End: 2024-12-30
Payer: COMMERCIAL

## 2024-12-30 ENCOUNTER — OFFICE VISIT (OUTPATIENT)
Dept: FAMILY MEDICINE CLINIC | Facility: CLINIC | Age: 49
End: 2024-12-30
Payer: COMMERCIAL

## 2024-12-30 VITALS
HEART RATE: 76 BPM | BODY MASS INDEX: 36.99 KG/M2 | OXYGEN SATURATION: 98 % | HEIGHT: 62 IN | WEIGHT: 201 LBS | SYSTOLIC BLOOD PRESSURE: 124 MMHG | DIASTOLIC BLOOD PRESSURE: 74 MMHG

## 2024-12-30 DIAGNOSIS — Z13.220 LIPID SCREENING: ICD-10-CM

## 2024-12-30 DIAGNOSIS — E66.812 CLASS 2 OBESITY WITH BODY MASS INDEX (BMI) OF 37.0 TO 37.9 IN ADULT, UNSPECIFIED OBESITY TYPE, UNSPECIFIED WHETHER SERIOUS COMORBIDITY PRESENT: ICD-10-CM

## 2024-12-30 DIAGNOSIS — Z13.1 DIABETES MELLITUS SCREENING: ICD-10-CM

## 2024-12-30 DIAGNOSIS — F41.9 ANXIETY: ICD-10-CM

## 2024-12-30 DIAGNOSIS — Z00.00 ANNUAL PHYSICAL EXAM: Primary | ICD-10-CM

## 2024-12-30 DIAGNOSIS — E55.9 VITAMIN D DEFICIENCY: ICD-10-CM

## 2024-12-30 LAB
25(OH)D3 SERPL-MCNC: 40.5 NG/ML (ref 30–100)
ALBUMIN SERPL BCG-MCNC: 4.6 G/DL (ref 3.5–5)
ALP SERPL-CCNC: 95 U/L (ref 34–104)
ALT SERPL W P-5'-P-CCNC: 33 U/L (ref 7–52)
ANION GAP SERPL CALCULATED.3IONS-SCNC: 7 MMOL/L (ref 4–13)
AST SERPL W P-5'-P-CCNC: 24 U/L (ref 13–39)
BASOPHILS # BLD AUTO: 0.04 THOUSANDS/ΜL (ref 0–0.1)
BASOPHILS NFR BLD AUTO: 1 % (ref 0–1)
BILIRUB SERPL-MCNC: 0.7 MG/DL (ref 0.2–1)
BUN SERPL-MCNC: 19 MG/DL (ref 5–25)
CALCIUM SERPL-MCNC: 10 MG/DL (ref 8.4–10.2)
CHLORIDE SERPL-SCNC: 101 MMOL/L (ref 96–108)
CHOLEST SERPL-MCNC: 212 MG/DL (ref ?–200)
CO2 SERPL-SCNC: 33 MMOL/L (ref 21–32)
CREAT SERPL-MCNC: 0.79 MG/DL (ref 0.6–1.3)
EOSINOPHIL # BLD AUTO: 0.13 THOUSAND/ΜL (ref 0–0.61)
EOSINOPHIL NFR BLD AUTO: 2 % (ref 0–6)
ERYTHROCYTE [DISTWIDTH] IN BLOOD BY AUTOMATED COUNT: 13.1 % (ref 11.6–15.1)
GFR SERPL CREATININE-BSD FRML MDRD: 87 ML/MIN/1.73SQ M
GLUCOSE P FAST SERPL-MCNC: 88 MG/DL (ref 65–99)
HCT VFR BLD AUTO: 42.3 % (ref 34.8–46.1)
HDLC SERPL-MCNC: 60 MG/DL
HGB BLD-MCNC: 13.6 G/DL (ref 11.5–15.4)
IMM GRANULOCYTES # BLD AUTO: 0.01 THOUSAND/UL (ref 0–0.2)
IMM GRANULOCYTES NFR BLD AUTO: 0 % (ref 0–2)
LDLC SERPL CALC-MCNC: 131 MG/DL (ref 0–100)
LYMPHOCYTES # BLD AUTO: 2.8 THOUSANDS/ΜL (ref 0.6–4.47)
LYMPHOCYTES NFR BLD AUTO: 40 % (ref 14–44)
MCH RBC QN AUTO: 29.1 PG (ref 26.8–34.3)
MCHC RBC AUTO-ENTMCNC: 32.2 G/DL (ref 31.4–37.4)
MCV RBC AUTO: 90 FL (ref 82–98)
MONOCYTES # BLD AUTO: 0.48 THOUSAND/ΜL (ref 0.17–1.22)
MONOCYTES NFR BLD AUTO: 7 % (ref 4–12)
NEUTROPHILS # BLD AUTO: 3.48 THOUSANDS/ΜL (ref 1.85–7.62)
NEUTS SEG NFR BLD AUTO: 50 % (ref 43–75)
NRBC BLD AUTO-RTO: 0 /100 WBCS
PLATELET # BLD AUTO: 286 THOUSANDS/UL (ref 149–390)
PMV BLD AUTO: 10.6 FL (ref 8.9–12.7)
POTASSIUM SERPL-SCNC: 4.9 MMOL/L (ref 3.5–5.3)
PROT SERPL-MCNC: 7.7 G/DL (ref 6.4–8.4)
RBC # BLD AUTO: 4.68 MILLION/UL (ref 3.81–5.12)
SODIUM SERPL-SCNC: 141 MMOL/L (ref 135–147)
TRIGL SERPL-MCNC: 107 MG/DL (ref ?–150)
TSH SERPL DL<=0.05 MIU/L-ACNC: 1.17 UIU/ML (ref 0.45–4.5)
WBC # BLD AUTO: 6.94 THOUSAND/UL (ref 4.31–10.16)

## 2024-12-30 PROCEDURE — 36415 COLL VENOUS BLD VENIPUNCTURE: CPT

## 2024-12-30 PROCEDURE — 85025 COMPLETE CBC W/AUTO DIFF WBC: CPT

## 2024-12-30 PROCEDURE — 80061 LIPID PANEL: CPT

## 2024-12-30 PROCEDURE — 80053 COMPREHEN METABOLIC PANEL: CPT

## 2024-12-30 PROCEDURE — 82306 VITAMIN D 25 HYDROXY: CPT

## 2024-12-30 PROCEDURE — 84443 ASSAY THYROID STIM HORMONE: CPT

## 2024-12-30 PROCEDURE — 99396 PREV VISIT EST AGE 40-64: CPT | Performed by: PHYSICIAN ASSISTANT

## 2024-12-30 RX ORDER — TIRZEPATIDE 2.5 MG/.5ML
2.5 INJECTION, SOLUTION SUBCUTANEOUS WEEKLY
Qty: 2 ML | Refills: 0 | Status: SHIPPED | OUTPATIENT
Start: 2024-12-30 | End: 2025-01-27

## 2024-12-30 RX ORDER — PHENTERMINE HYDROCHLORIDE 15 MG/1
15 CAPSULE ORAL EVERY MORNING
Qty: 30 CAPSULE | Refills: 1 | Status: SHIPPED | OUTPATIENT
Start: 2024-12-30 | End: 2024-12-30 | Stop reason: CLARIF

## 2024-12-30 NOTE — PATIENT INSTRUCTIONS
"Patient Education     Routine physical for adults   The Basics   Written by the doctors and editors at [facility]   What is a physical? -- A physical is a routine visit, or \"check-up,\" with your doctor. You might also hear it called a \"wellness visit\" or \"preventive visit.\"  During each visit, the doctor will:   Ask about your physical and mental health   Ask about your habits, behaviors, and lifestyle   Do an exam   Give you vaccines if needed   Talk to you about any medicines you take   Give advice about your health   Answer your questions  Getting regular check-ups is an important part of taking care of your health. It can help your doctor find and treat any problems you have. But it's also important for preventing health problems.  A routine physical is different from a \"sick visit.\" A sick visit is when you see a doctor because of a health concern or problem. Since physicals are scheduled ahead of time, you can think about what you want to ask the doctor.  How often should I get a physical? -- It depends on your age and health. In general, for people age 21 years and older:   If you are younger than 50 years, you might be able to get a physical every 3 years.   If you are 50 years or older, your doctor might recommend a physical every year.  If you have an ongoing health condition, like diabetes or high blood pressure, your doctor will probably want to see you more often.  What happens during a physical? -- In general, each visit will include:   Physical exam - The doctor or nurse will check your height, weight, heart rate, and blood pressure. They will also look at your eyes and ears. They will ask about how you are feeling and whether you have any symptoms that bother you.   Medicines - It's a good idea to bring a list of all the medicines you take to each doctor visit. Your doctor will talk to you about your medicines and answer any questions. Tell them if you are having any side effects that bother you. You " "should also tell them if you are having trouble paying for any of your medicines.   Habits and behaviors - This includes:   Your diet   Your exercise habits   Whether you smoke, drink alcohol, or use drugs   Whether you are sexually active   Whether you feel safe at home  Your doctor will talk to you about things you can do to improve your health and lower your risk of health problems. They will also offer help and support. For example, if you want to quit smoking, they can give you advice and might prescribe medicines. If you want to improve your diet or get more physical activity, they can help you with this, too.   Lab tests, if needed - The tests you get will depend on your age and situation. For example, your doctor might want to check your:   Cholesterol   Blood sugar   Iron level   Vaccines - The recommended vaccines will depend on your age, health, and what vaccines you already had. Vaccines are very important because they can prevent certain serious or deadly infections.   Discussion of screening - \"Screening\" means checking for diseases or other health problems before they cause symptoms. Your doctor can recommend screening based on your age, risk, and preferences. This might include tests to check for:   Cancer, such as breast, prostate, cervical, ovarian, colorectal, prostate, lung, or skin cancer   Sexually transmitted infections, such as chlamydia and gonorrhea   Mental health conditions like depression and anxiety  Your doctor will talk to you about the different types of screening tests. They can help you decide which screenings to have. They can also explain what the results might mean.   Answering questions - The physical is a good time to ask the doctor or nurse questions about your health. If needed, they can refer you to other doctors or specialists, too.  Adults older than 65 years often need other care, too. As you get older, your doctor will talk to you about:   How to prevent falling at " home   Hearing or vision tests   Memory testing   How to take your medicines safely   Making sure that you have the help and support you need at home  All topics are updated as new evidence becomes available and our peer review process is complete.  This topic retrieved from [employer] on: May 02, 2024.  Topic 242902 Version 1.0  Release: 32.4.3 - C32.122  © 2024 UpToDate, Inc. and/or its affiliates. All rights reserved.  Consumer Information Use and Disclaimer   Disclaimer: This generalized information is a limited summary of diagnosis, treatment, and/or medication information. It is not meant to be comprehensive and should be used as a tool to help the user understand and/or assess potential diagnostic and treatment options. It does NOT include all information about conditions, treatments, medications, side effects, or risks that may apply to a specific patient. It is not intended to be medical advice or a substitute for the medical advice, diagnosis, or treatment of a health care provider based on the health care provider's examination and assessment of a patient's specific and unique circumstances. Patients must speak with a health care provider for complete information about their health, medical questions, and treatment options, including any risks or benefits regarding use of medications. This information does not endorse any treatments or medications as safe, effective, or approved for treating a specific patient. UpToDate, Inc. and its affiliates disclaim any warranty or liability relating to this information or the use thereof.The use of this information is governed by the Terms of Use, available at https://www.woltersCompassuwer.com/en/know/clinical-effectiveness-terms. 2024© UpToDate, Inc. and its affiliates and/or licensors. All rights reserved.  Copyright   © 2024 UpToDate, Inc. and/or its affiliates. All rights reserved.

## 2024-12-30 NOTE — ASSESSMENT & PLAN NOTE
Controlled per patient.  Continue with psychiatry.  Currently on Prozac 60 mg daily, Seroquel 100 mg at bedtime, and clonazepam 0.5 mg as needed.  Orders:    CBC and differential; Future    TSH, 3rd generation with Free T4 reflex; Future

## 2024-12-30 NOTE — ASSESSMENT & PLAN NOTE
Currently on 6000 units daily.  Recheck level with labs as ordered.  Orders:    Vitamin D 25 hydroxy; Future

## 2024-12-30 NOTE — PROGRESS NOTES
Adult Annual Physical  Name: Hayley Zamora      : 1975      MRN: [id number]  Encounter Provider: Hien Garcia PA-C  Encounter Date: 2024   Encounter department: [facility]    Assessment & Plan  Annual physical exam         Class 2 obesity with body mass index (BMI) of 37.0 to 37.9 in adult, unspecified obesity type, unspecified whether serious comorbidity present  Prior Authorization Clinical Questions for Weight Management Pharmacotherapy    1. Does the patient have a contrainidcation to medication prescribed for weight management?: No  2. Does the patient have a diagnosis of obesity, confirmed by a BMI greater than or equal to 30 kg/m^2?: Yes  3. Does the patient have a BMI of greater than or equal to 27 kg/m^2 with at least one weight-related comorbidity/risk factor/complication (e.g. diabetes, dyslipidemia, coronary artery disease)?: No  5. Has the patient been on a weight loss regimen of low-calorie diet, increased physical activity, and lifestyle modifications for a minimum of 6 months?: Yes  6. Has the patient completed a comprehensive weight loss program (ie, Weight Watchers, Noom, Bariatrics, other lucero on phone)? If so, what?: No  7. Does the patient have a history of type 2 diabetes?: No  8. Has the member tried and failed other weight loss medication within the past 12 months?: No  9. Will the member use requested medication in combination with another GLP agonist or weight loss drug?: No  10. Is the medication a controlled substance?: No     Baseline weight (in pounds): 201 lbs       Patient interested in assistance with weight loss.  Discussed medication options and patient decided to proceed with Zepbound.  Will initiate Zepbound 2.5 mg weekly.  She was encouraged to provide an update on how she is tolerating the medication after her third dose so that the next strength can be sent to the pharmacy for her.  Follow-up in 2 months.  Call with concerns in the  interim.  Continue following a balanced diet and exercising regularly.  Orders:    tirzepatide (Zepbound) 2.5 mg/0.5 mL auto-injector; Inject 0.5 mL (2.5 mg total) under the skin once a week for 28 days    Vitamin D deficiency  Currently on 6000 units daily.  Recheck level with labs as ordered.  Orders:    Vitamin D 25 hydroxy; Future    Anxiety  Controlled per patient.  Continue with psychiatry.  Currently on Prozac 60 mg daily, Seroquel 100 mg at bedtime, and clonazepam 0.5 mg as needed.  Orders:    CBC and differential; Future    TSH, 3rd generation with Free T4 reflex; Future    Diabetes mellitus screening    Orders:    Comprehensive metabolic panel; Future    Lipid screening    Orders:    Lipid Panel with Direct LDL reflex; Future      Immunizations and preventive care screenings were discussed with patient today. Appropriate education was printed on patient's after visit summary.    Counseling:  Exercise: the importance of regular exercise/physical activity was discussed. Recommend exercise 3-5 times per week for at least 30 minutes.          History of Present Illness     Adult Annual Physical:  Patient presents for annual physical.     Anxiety/OCD/panic attacks - on Prozac 60 mg daily, Seroquel 100 mg at bedtime, and clonazepam 0.5 mg as needed - sees psychiatry    Vit D def - on 6000 units daily - last level was 27.6 in 12/2023     Notes that she is concerned about weight - notes that meds were increased in October - states that she is back to using a treadmill and not eating differently (nightly for 30 min) - notes that she wants help   .     Diet and Physical Activity:  - Diet/Nutrition: well balanced diet.  - Exercise: walking.    Depression Screening:  - PHQ-2 Score: 0  - PHQ-9 Score: 0    General Health:  - Sleep: 7-8 hours of sleep on average and sleeps well.  - Hearing: normal hearing bilateral ears.  - Vision: goes for regular eye exams.  - Dental: regular dental visits.    /GYN Health:  - Follows  "with GYN: yes.   - Menopause: perimenopausal.   - Contraception:. slight menses around Brandy but spacing out      Advanced Care Planning:  - Has an advanced directive?: no    - Has a durable medical POA?: no      Review of Systems   Constitutional:  Negative for chills and fever.   HENT:  Negative for congestion, rhinorrhea and sore throat.    Eyes:  Negative for visual disturbance.   Respiratory:  Negative for cough, shortness of breath and wheezing.    Cardiovascular:  Negative for chest pain, palpitations and leg swelling.   Gastrointestinal:  Negative for abdominal pain, blood in stool, constipation, diarrhea, nausea and vomiting.   Genitourinary:  Negative for dysuria and frequency.   Musculoskeletal:  Negative for arthralgias and myalgias.   Skin:  Negative for rash.   Neurological:  Negative for dizziness, syncope and headaches.   Hematological:  Does not bruise/bleed easily.   Psychiatric/Behavioral:  Negative for dysphoric mood. The patient is not nervous/anxious.      Medical History Reviewed by provider this encounter:  Tobacco  Allergies  Meds  Surg Hx  Fam Hx  Soc Hx    .    Objective   /74   Pulse 76   Ht 5' 1.5\" (1.562 m)   Wt 91.2 kg (201 lb)   LMP 11/23/2023 (Approximate)   SpO2 98%   BMI 37.36 kg/m²     Physical Exam  Vitals reviewed.   Constitutional:       General: She is not in acute distress.     Appearance: Normal appearance. She is well-developed. She is obese. She is not ill-appearing.   HENT:      Head: Normocephalic and atraumatic.      Right Ear: Tympanic membrane, ear canal and external ear normal.      Left Ear: Tympanic membrane, ear canal and external ear normal.      Mouth/Throat:      Mouth: Mucous membranes are moist.      Pharynx: No oropharyngeal exudate or posterior oropharyngeal erythema.   Eyes:      Conjunctiva/sclera: Conjunctivae normal.      Pupils: Pupils are equal, round, and reactive to light.   Neck:      Thyroid: No thyromegaly.      Vascular: No " carotid bruit.   Cardiovascular:      Rate and Rhythm: Normal rate and regular rhythm.      Pulses: Normal pulses.      Heart sounds: Normal heart sounds. No murmur heard.  Pulmonary:      Effort: Pulmonary effort is normal.      Breath sounds: Normal breath sounds. No wheezing, rhonchi or rales.   Abdominal:      General: Bowel sounds are normal. There is no distension.      Palpations: Abdomen is soft. There is no mass.      Tenderness: There is no abdominal tenderness. There is no guarding.   Musculoskeletal:      Cervical back: Normal range of motion and neck supple.      Right lower leg: No edema.      Left lower leg: No edema.   Lymphadenopathy:      Cervical: No cervical adenopathy.   Skin:     General: Skin is warm and dry.      Findings: No rash.   Neurological:      Mental Status: She is alert.      Sensory: No sensory deficit.      Motor: No weakness.      Comments: 5/5 strength in UE and LE   Psychiatric:         Mood and Affect: Mood normal.         Behavior: Behavior normal.         Thought Content: Thought content normal.         Judgment: Judgment normal.

## 2024-12-31 ENCOUNTER — RESULTS FOLLOW-UP (OUTPATIENT)
Dept: FAMILY MEDICINE CLINIC | Facility: CLINIC | Age: 49
End: 2024-12-31

## 2025-01-01 PROBLEM — E66.812 CLASS 2 OBESITY WITH BODY MASS INDEX (BMI) OF 37.0 TO 37.9 IN ADULT: Status: ACTIVE | Noted: 2017-04-26

## 2025-01-01 NOTE — ASSESSMENT & PLAN NOTE
Prior Authorization Clinical Questions for Weight Management Pharmacotherapy    1. Does the patient have a contrainidcation to medication prescribed for weight management?: No  2. Does the patient have a diagnosis of obesity, confirmed by a BMI greater than or equal to 30 kg/m^2?: Yes  3. Does the patient have a BMI of greater than or equal to 27 kg/m^2 with at least one weight-related comorbidity/risk factor/complication (e.g. diabetes, dyslipidemia, coronary artery disease)?: No  5. Has the patient been on a weight loss regimen of low-calorie diet, increased physical activity, and lifestyle modifications for a minimum of 6 months?: Yes  6. Has the patient completed a comprehensive weight loss program (ie, Weight Watchers, Noom, Bariatrics, other lucero on phone)? If so, what?: No  7. Does the patient have a history of type 2 diabetes?: No  8. Has the member tried and failed other weight loss medication within the past 12 months?: No  9. Will the member use requested medication in combination with another GLP agonist or weight loss drug?: No  10. Is the medication a controlled substance?: No     Baseline weight (in pounds): 201 lbs       Patient interested in assistance with weight loss.  Discussed medication options and patient decided to proceed with Zepbound.  Will initiate Zepbound 2.5 mg weekly.  She was encouraged to provide an update on how she is tolerating the medication after her third dose so that the next strength can be sent to the pharmacy for her.  Follow-up in 2 months.  Call with concerns in the interim.  Continue following a balanced diet and exercising regularly.  Orders:    tirzepatide (Zepbound) 2.5 mg/0.5 mL auto-injector; Inject 0.5 mL (2.5 mg total) under the skin once a week for 28 days

## 2025-02-14 ENCOUNTER — RA CDI HCC (OUTPATIENT)
Dept: OTHER | Facility: HOSPITAL | Age: 50
End: 2025-02-14

## 2025-04-25 ENCOUNTER — HOSPITAL ENCOUNTER (OUTPATIENT)
Dept: RADIOLOGY | Age: 50
Discharge: HOME | End: 2025-04-25
Payer: COMMERCIAL

## 2025-04-25 VITALS — BODY MASS INDEX: 35.88 KG/M2 | HEIGHT: 62 IN | WEIGHT: 195 LBS

## 2025-04-25 DIAGNOSIS — Z12.39 BREAST CANCER SCREENING OTHER THAN MAMMOGRAM: ICD-10-CM

## 2025-04-25 DIAGNOSIS — R92.30 DENSE BREAST TISSUE: ICD-10-CM

## 2025-04-25 PROCEDURE — 76641 ULTRASOUND BREAST COMPLETE: CPT

## 2025-06-09 NOTE — PROGRESS NOTES
"Weight Management Medical Nutrition Assessment  Hayley is here for meal planning. Completed Healthy Core in 2017 and was seen in 2/2024. Current wt: 200.4 lbs. She has gained 16.4 lbs  since 2/2024. She has maintained a loss of 42 lbs from her highest weight. Seroquel reduced to a dose where she does not experience increased hunger. She saw her PCP 12/2024 and zepbound was ordered but she did not end up taking, unsure if it was due to insurance or not. Did try phentermine but saw no results from this. She is interested in seeing provider to discuss GLP-1. Based on food recall may not be consuming enough calories at times. Reports logging and avg 1100 calories/day but appears bulk of calories being consumed later in the day. She would like to reincorporate meal replacements, product ordered. Meal plan and other resources provided. Options for f/u reviewed. She will f/u in 1 month.     Padmini Luz Elena going to 9th grade (6/2025)    Dislikes: n/a    Patient seen by Medical Provider in past 6 months:  no  Requested to schedule appointment with Medical Provider: No    Anthropometric Measurements  Start Weight (#): 235.5 lbs 7/12/17; resume care 6/18/25 200.4 lbs   Current Weight (#): 200.4 lbs   TBW % Change from start weight: 14.9%  Ideal Body Weight (#):110 lbs 62\" (BMI 25 134.1 lbs)  Goal Weight (#):160's  Highest: 242 lbs  Lowest:    Weight Loss History  Previous weight loss attempts: Counseling with  MD  Exercise  High Protein/Low CHO diets (Atkins, [place], etc.)  Meal Replacements (Medifast, Slim Fast, etc.)  Nutrition Counseling with RD  Self Created Diets (Portion Control, Healthy Food Choices, etc.)    Food and Nutrition Related History  Wake up: 8:00   Bed Time:11-12    Food Recall  Breakfast:10:00: atkins protein shake      Snack:skip  Lunch:1:00:greek yogurt, banana  Snack:skip  Dinner: 7:15:   ~3 oz protein, ~1 cup veggies, sometimes carb OR spaghetti, turkey meatballs  Snack: pretzels OR small bowl of " cereal     Beverages: water and coffee, seltzer water, unsweetened tea  Volume of beverage intake: 100 oz water, 1-2 cups coffee    Weekends: Same  Cravings: sometimes carb  Trouble area of day:night    Frequency of Eating out: irregularly  Food restrictions:n/a  Cooking: self   Food Shopping: self    Physical Activity Intake  Activity:Walking  Frequency:40-45 minutes most days>>>>not currently due to eye issue  Physical limitations/barriers to exercise: n/a    Estimated Needs  Energy  SECA: BMR:n/a      X 1.3 -1000 =  Maddi Welch Energy Needs: BMR : 1477   1-2# loss weekly sedentary: 773-1273            1-2# loss weekly lightly active:6955-1667  Maintenance calories for sedentary activity level: 1773  Protein:60-75 gm      (1.2-1.5g/kg IBW)  Fluid: 58 oz     (35mL/kg IBW)    Nutrition Diagnosis  Yes;    Overweight/obesity  related to Excess energy intake as evidenced by  BMI more than normative standard for age and sex (obesity-grade II 35-39.9)       Nutrition Intervention    Nutrition Prescription  Calories:900-1200  Protein: 85-95 gm    Meal Plan (Brain/Pro)  Breakfast: 200-275, 27   Snack: skip  Lunch: 200-300, 20-30  Snack: 150-200, 10  Dinner: 350-400, 30  Snack: skip    Nutrition Education:    Healthy Core Manual  Calorie controlled menu  Lean protein food choices  Healthy snack options  Food journaling tips      Nutrition Counseling:  Strategies: meal planning, portion sizes, healthy snack choices, hydration, fiber intake, protein intake, exercise, food journal      Monitoring and Evaluation:  Evaluation criteria:  Energy Intake  Meet protein needs  Maintain adequate hydration  Monitor weekly weight  Meal planning/preparation  Food journal   Decreased portions at mealtimes and snacks  Physical activity     Barriers to learning:none  Readiness to change: Preparation:  (Getting ready to change)   Comprehension: very good  Expected Compliance: very good

## 2025-06-18 ENCOUNTER — CLINICAL SUPPORT (OUTPATIENT)
Dept: BARIATRICS | Facility: CLINIC | Age: 50
End: 2025-06-18

## 2025-06-18 VITALS — BODY MASS INDEX: 36.88 KG/M2 | WEIGHT: 200.4 LBS | HEIGHT: 62 IN

## 2025-06-18 DIAGNOSIS — R63.5 ABNORMAL WEIGHT GAIN: Primary | ICD-10-CM

## 2025-06-18 PROCEDURE — RECHECK

## 2025-06-18 PROCEDURE — WMDI30

## 2025-07-10 ENCOUNTER — TELEPHONE (OUTPATIENT)
Dept: BARIATRICS | Facility: CLINIC | Age: 50
End: 2025-07-10